# Patient Record
Sex: MALE | Race: WHITE | Employment: OTHER | URBAN - METROPOLITAN AREA
[De-identification: names, ages, dates, MRNs, and addresses within clinical notes are randomized per-mention and may not be internally consistent; named-entity substitution may affect disease eponyms.]

---

## 2017-01-30 RX ORDER — ACLIDINIUM BROMIDE 400 UG/1
POWDER, METERED RESPIRATORY (INHALATION)
Qty: 3 EACH | Refills: 0 | Status: SHIPPED | OUTPATIENT
Start: 2017-01-30 | End: 2017-03-16

## 2017-03-15 ENCOUNTER — TELEPHONE (OUTPATIENT)
Dept: PULMONOLOGY | Age: 62
End: 2017-03-15

## 2017-03-17 RX ORDER — BUDESONIDE AND FORMOTEROL FUMARATE DIHYDRATE 160; 4.5 UG/1; UG/1
2 AEROSOL RESPIRATORY (INHALATION) 2 TIMES DAILY
Qty: 3 INHALER | Refills: 3 | Status: SHIPPED | OUTPATIENT
Start: 2017-03-17 | End: 2017-10-05

## 2017-05-26 ENCOUNTER — TELEPHONE (OUTPATIENT)
Dept: PULMONOLOGY | Age: 62
End: 2017-05-26

## 2017-10-04 ENCOUNTER — TELEPHONE (OUTPATIENT)
Dept: PULMONOLOGY | Age: 62
End: 2017-10-04

## 2017-10-04 NOTE — TELEPHONE ENCOUNTER
Mirna's insurance will no longer pay Kip Gasca anymore so he will have to be switched for another medication. She would like clarification on where he is supposed to go from there. She doesn't know about the Symbicort if they are going to cover it or not. Will you please give her a call. If its in the evening will you call their home. If not call the cell phone.

## 2017-10-05 NOTE — TELEPHONE ENCOUNTER
I spoke with Rick Osorio and she will call me back when she finds out what medication they will cover to replace the [facility] [place] [facility] [place]

## 2017-10-19 DIAGNOSIS — J44.9 CHRONIC OBSTRUCTIVE PULMONARY DISEASE, UNSPECIFIED COPD TYPE (HCC): Primary | ICD-10-CM

## 2017-10-23 RX ORDER — ALBUTEROL SULFATE 90 UG/1
2 AEROSOL, METERED RESPIRATORY (INHALATION) EVERY 6 HOURS PRN
Qty: 1 INHALER | Refills: 0 | Status: SHIPPED | OUTPATIENT
Start: 2017-10-23 | End: 2017-11-13 | Stop reason: SDUPTHER

## 2017-10-26 ENCOUNTER — HOSPITAL ENCOUNTER (OUTPATIENT)
Dept: PULMONOLOGY | Age: 62
Discharge: HOME | End: 2017-10-26
Attending: INTERNAL MEDICINE | Admitting: INTERNAL MEDICINE

## 2017-10-26 VITALS — OXYGEN SATURATION: 96 % | HEART RATE: 80 BPM

## 2017-10-26 DIAGNOSIS — R91.1 SOLITARY PULMONARY NODULE: ICD-10-CM

## 2017-10-26 RX ORDER — ALBUTEROL SULFATE 90 UG/1
4 AEROSOL, METERED RESPIRATORY (INHALATION) ONCE
Status: COMPLETED | OUTPATIENT
Start: 2017-10-26 | End: 2017-10-26

## 2017-10-26 RX ADMIN — ALBUTEROL SULFATE 4 PUFF: 90 AEROSOL, METERED RESPIRATORY (INHALATION) at 13:33

## 2017-11-02 ENCOUNTER — TELEPHONE (OUTPATIENT)
Dept: PULMONOLOGY | Age: 62
End: 2017-11-02

## 2017-11-13 ENCOUNTER — OFFICE VISIT (OUTPATIENT)
Dept: PULMONOLOGY | Age: 62
End: 2017-11-13

## 2017-11-13 VITALS
BODY MASS INDEX: 15.71 KG/M2 | OXYGEN SATURATION: 96 % | HEIGHT: 64 IN | SYSTOLIC BLOOD PRESSURE: 103 MMHG | HEART RATE: 88 BPM | RESPIRATION RATE: 18 BRPM | WEIGHT: 92 LBS | DIASTOLIC BLOOD PRESSURE: 71 MMHG

## 2017-11-13 DIAGNOSIS — J44.9 COPD, MILD (HCC): Primary | ICD-10-CM

## 2017-11-13 DIAGNOSIS — Z87.891 PERSONAL HISTORY OF TOBACCO USE: ICD-10-CM

## 2017-11-13 PROCEDURE — G8926 SPIRO NO PERF OR DOC: HCPCS | Performed by: INTERNAL MEDICINE

## 2017-11-13 PROCEDURE — G8427 DOCREV CUR MEDS BY ELIG CLIN: HCPCS | Performed by: INTERNAL MEDICINE

## 2017-11-13 PROCEDURE — 3023F SPIROM DOC REV: CPT | Performed by: INTERNAL MEDICINE

## 2017-11-13 PROCEDURE — G8419 CALC BMI OUT NRM PARAM NOF/U: HCPCS | Performed by: INTERNAL MEDICINE

## 2017-11-13 PROCEDURE — 99213 OFFICE O/P EST LOW 20 MIN: CPT | Performed by: INTERNAL MEDICINE

## 2017-11-13 PROCEDURE — 1036F TOBACCO NON-USER: CPT | Performed by: INTERNAL MEDICINE

## 2017-11-13 PROCEDURE — G8484 FLU IMMUNIZE NO ADMIN: HCPCS | Performed by: INTERNAL MEDICINE

## 2017-11-13 PROCEDURE — 3017F COLORECTAL CA SCREEN DOC REV: CPT | Performed by: INTERNAL MEDICINE

## 2017-11-13 RX ORDER — ALBUTEROL SULFATE 90 UG/1
2 AEROSOL, METERED RESPIRATORY (INHALATION) EVERY 6 HOURS PRN
Qty: 1 INHALER | Refills: 3 | Status: SHIPPED | OUTPATIENT
Start: 2017-11-13 | End: 2018-04-15 | Stop reason: SDUPTHER

## 2017-11-13 NOTE — PROGRESS NOTES
noted.      Lungs/pleura: There is moderate centrilobular emphysema throughout the lungs. There is hyperinflation suggesting COPD. Again demonstrated is a 4 mm   subpleural nodule in the right upper lobe best seen on image 51. This is   stable since at least 01/02/2013 and therefore likely benign. No other pulmonary nodules are identified within the lungs. Previously seen   left lower lobe small pulmonary nodule may have represented an   infectious/inflammatory process. Scattered calcified granulomas are noted in   both lungs. No evidence of pleural effusion or pneumothorax. The central   airways are clear. Upper Abdomen: Limited images of the upper abdomen demonstrate no acute   abnormality. Scattered calcified granulomata are noted in the spleen. Soft Tissues/Bones: Age related degenerative changes of the visualized   osseous structures without focal destructive lesion. Impression   IMPRESSION:   4 mm subpleural right upper lobe pulmonary nodule stable since 01/02/2013. This documents 2 years of stability. This nodule can be considered benign   and may represent a noncalcified granuloma. As per [employer] guidelines described below, no further follow-up is   required. Small left lower lobe pulmonary nodule seen on recent prior examination have   resolved and may represent a resolved infectious/inflammatory process. No   other noncalcified pulmonary nodules are identified. Evidence of remote calcified granulomatous disease.      His alpha 1 antitrypsin was normal    Past Medical History:   Diagnosis Date    Bipolar 1 disorder (HCC)     Chronic bronchitis, obstructive (HCC)     COPD (chronic obstructive pulmonary disease) (HCC)     GERD (gastroesophageal reflux disease)     Darleen's      Current Outpatient Prescriptions   Medication Sig Dispense Refill    albuterol sulfate  (90 Base) MCG/ACT inhaler Inhale 2 puffs into the lungs every 6 hours as needed for Wheezing 1 Inhaler 3    tiotropium (SPIRIVA RESPIMAT) 2.5 MCG/ACT AERS inhaler Inhale 2 puffs into the lungs daily 3 Inhaler 1    fluticasone-salmeterol (ADVAIR HFA) 230-21 MCG/ACT inhaler Inhale 2 puffs into the lungs 2 times daily 3 Inhaler 1    omeprazole (PRILOSEC) 40 MG delayed release capsule Take 1 capsule by mouth  daily with breakfast 90 capsule 0    benztropine (COGENTIN) 2 MG tablet Take 1 mg by mouth daily.  paliperidone (INVEGA) 6 MG ER tablet Take 6 mg by mouth every morning. No current facility-administered medications for this visit. Review of Systems   Constitutional: Negative. Negative for fever, chills, diaphoresis, activity change, appetite change, fatigue and unexpected weight change. HENT: Negative. Negative for hearing loss, ear pain, nosebleeds, congestion, facial swelling, rhinorrhea, sneezing, neck pain, neck stiffness, postnasal drip, sinus pressure and ear discharge. Eyes: Negative. Negative for photophobia, pain, discharge, redness, itching and visual disturbance. Respiratory: As per HPI  Cardiovascular: Negative. Negative for chest pain, palpitations and leg swelling. Gastrointestinal: Negative. Negative for abdominal pain, blood in stool, abdominal distention and anal bleeding. Genitourinary: Negative. Negative for dysuria, urgency, frequency, hematuria, decreased urine volume, enuresis and difficulty urinating. Musculoskeletal: Negative. Negative for myalgias, back pain, joint swelling, arthralgias and gait problem. Skin: Negative. Negative for color change and pallor. Neurological: Negative. Negative for dizziness, tremors, seizures, syncope, facial asymmetry, speech difficulty, weakness, light-headedness, numbness and headaches. Hematological: Negative. Negative for adenopathy. Psychiatric/Behavioral: Negative. Negative for hallucinations, behavioral problems, confusion and agitation.  The patient is not nervous/anxious and is not hyperactive. Blood pressure 103/71, pulse 88, resp. rate 18, height 5' 4\" (1.626 m), weight 92 lb (41.7 kg), SpO2 96 %. Physical Exam   Constitutional: Oriented, well-developed and well-nourished. No distress. HENT:   Head: Normocephalic and atraumatic. Mouth/Throat: Oropharynx is clear and moist. No oropharyngeal exudate. Eyes: Conjunctivae and extraocular motions are normal. Pupils are equal, round, and reactive to light. Right eye exhibits no discharge. Left eye exhibits no discharge. Neck: Normal range of motion. Neck supple. No JVD present. Carotid bruit is not present. No rigidity. No tracheal deviation present. No thyromegaly present. Cardiovascular: Normal rate, regular rhythm, S1&S2 and intact distal pulses. Pulmonary/Chest: Effort normal and breath sounds normal. No stridor. No respiratory distress. No wheezes, rhonchi, rales or tenderness. Abdominal: Soft. Bowel sounds are normal, no shifting dullness, no distension and no mass. No tenderness. No rebound and no guarding. Musculoskeletal: Normal range of motion. No edema and no tenderness. Clubbing noted  Lymphadenopathy:    No cervical adenopathy. No axillary adenopathy. Neurological: Alert and oriented. Normal reflexes. No cranial nerve deficit. Normal muscle tone. Coordination normal.   Skin: Skin is warm and dry. No rash noted. No erythema. Psychiatric: Normal mood and affect. Behavior is normal. Thought content normal.        Assessment:    1. COPD, mild ([address]     2. Personal history of tobacco use  CT Lung Screening           Plan:    1. He continues to do well overall  2. He will need repeat CT chest, reorder  3. Continue Advair 230 BID, Spiriva daily and Albuterol PRN  4. I encouraged him to continue smoking cessation  5. Continue acapella and neb tx BID for airway clearance  6.  RTC in 6 months or sooner if needed

## 2017-11-13 NOTE — LETTER
Pulmonary, Critical Care & Sleep  [address], [address] [facility],[address] [address]  Phone: [phone]  Fax: [phone]      November 13, 2017       Patient: Robinson Singh   MR Number: [id number]   YOB: 1955   Date of Visit: 11/13/2017       Dear Dr. Rogelio Head:    I saw Mr. Isaac Brown today for F/U visit. Below are the relevant portions of my assessment and plan of care. Assessment:    1. COPD, mild ([address]     2. Personal history of tobacco use  CT Lung Screening         Plan:    1. He continues to do well overall  2. He will need repeat CT chest, reorder  3. Continue Advair 230 BID, Spiriva daily and Albuterol PRN  4. I encouraged him to continue smoking cessation  5. Continue acapella and neb tx BID for airway clearance  6. RTC in 6 months or sooner if needed  If you have questions, please do not hesitate to call me. I look forward to following Lavonne Joshua along with you.     Sincerely,        Adina Watson MD    CC providers:  Lucien Mclaughlin DO  [address] [facility] [address] [place] [address]  VIA [place]

## 2017-11-13 NOTE — COMMUNICATION BODY
Assessment:     Assessment:    1. COPD, mild ([facility]     2. Personal history of tobacco use  CT Lung Screening         Plan:     Plan:    1. He continues to do well overall  2. He will need repeat CT chest, reorder  3. Continue Advair 230 BID, Spiriva daily and Albuterol PRN  4. I encouraged him to continue smoking cessation  5. Continue acapella and neb tx BID for airway clearance  6.  RTC in 6 months or sooner if needed

## 2018-01-22 ENCOUNTER — TELEPHONE (OUTPATIENT)
Dept: INTERNAL MEDICINE CLINIC | Age: 63
End: 2018-01-22

## 2018-01-22 PROBLEM — R47.81 SLURRED SPEECH: Status: ACTIVE | Noted: 2018-01-22

## 2018-01-22 PROBLEM — K21.9 GERD (GASTROESOPHAGEAL REFLUX DISEASE): Status: ACTIVE | Noted: 2018-01-22

## 2018-01-22 PROBLEM — R65.10 SIRS (SYSTEMIC INFLAMMATORY RESPONSE SYNDROME) (HCC): Status: ACTIVE | Noted: 2018-01-22

## 2018-01-22 NOTE — TELEPHONE ENCOUNTER
Pt started having vomiting and nausea this morning. He was a previous pt of Dr. Gerber Ocasio. There are no available appts with any physician. Can you please contact pt's spouse with scheduling information?

## 2018-01-30 ENCOUNTER — OFFICE VISIT (OUTPATIENT)
Dept: INTERNAL MEDICINE CLINIC | Age: 63
End: 2018-01-30

## 2018-01-30 VITALS
BODY MASS INDEX: 15.89 KG/M2 | HEART RATE: 78 BPM | WEIGHT: 92.6 LBS | DIASTOLIC BLOOD PRESSURE: 70 MMHG | SYSTOLIC BLOOD PRESSURE: 100 MMHG | OXYGEN SATURATION: 97 % | TEMPERATURE: 97.8 F

## 2018-01-30 DIAGNOSIS — J44.9 CHRONIC OBSTRUCTIVE PULMONARY DISEASE, UNSPECIFIED COPD TYPE (HCC): Primary | ICD-10-CM

## 2018-01-30 DIAGNOSIS — Z13.220 SCREENING CHOLESTEROL LEVEL: ICD-10-CM

## 2018-01-30 DIAGNOSIS — F31.9 BIPOLAR AFFECTIVE DISORDER, REMISSION STATUS UNSPECIFIED (HCC): ICD-10-CM

## 2018-01-30 DIAGNOSIS — K21.9 GASTROESOPHAGEAL REFLUX DISEASE WITHOUT ESOPHAGITIS: ICD-10-CM

## 2018-01-30 DIAGNOSIS — Z23 NEED FOR PROPHYLACTIC VACCINATION AGAINST STREPTOCOCCUS PNEUMONIAE (PNEUMOCOCCUS): ICD-10-CM

## 2018-01-30 PROCEDURE — 90471 IMMUNIZATION ADMIN: CPT | Performed by: INTERNAL MEDICINE

## 2018-01-30 PROCEDURE — 1111F DSCHRG MED/CURRENT MED MERGE: CPT | Performed by: INTERNAL MEDICINE

## 2018-01-30 PROCEDURE — 90670 PCV13 VACCINE IM: CPT | Performed by: INTERNAL MEDICINE

## 2018-01-30 PROCEDURE — 99214 OFFICE O/P EST MOD 30 MIN: CPT | Performed by: INTERNAL MEDICINE

## 2018-01-30 RX ORDER — OMEPRAZOLE 20 MG/1
20 CAPSULE, DELAYED RELEASE ORAL DAILY
Qty: 90 CAPSULE | Refills: 1 | Status: SHIPPED | OUTPATIENT
Start: 2018-01-30 | End: 2018-04-15 | Stop reason: SDUPTHER

## 2018-01-30 ASSESSMENT — ENCOUNTER SYMPTOMS
HEARTBURN: 1
WHEEZING: 0
DIFFICULTY BREATHING: 1
COUGH: 1
RHINORRHEA: 0
CHEST TIGHTNESS: 0
FREQUENT THROAT CLEARING: 0
SPUTUM PRODUCTION: 0
TROUBLE SWALLOWING: 0
HEMOPTYSIS: 0
SHORTNESS OF BREATH: 1
HOARSE VOICE: 1
SORE THROAT: 0

## 2018-01-30 ASSESSMENT — COPD QUESTIONNAIRES: COPD: 1

## 2018-01-30 NOTE — PROGRESS NOTES
Subjective:      Patient ID: Byron Mckay is a 57 y.o. male. COPD   He complains of cough, difficulty breathing, hoarse voice and shortness of breath. There is no chest tightness, frequent throat clearing, hemoptysis, sputum production or wheezing. This is a chronic problem. The current episode started more than 1 year ago. The problem occurs rarely. The problem has been unchanged. The cough is non-productive. Associated symptoms include heartburn. Pertinent negatives include no appetite change, chest pain, dyspnea on exertion, ear congestion, ear pain, fever, headaches, malaise/fatigue, myalgias, nasal congestion, orthopnea, PND, postnasal drip, rhinorrhea, sneezing, sore throat, sweats, trouble swallowing or weight loss. His symptoms are aggravated by nothing. His symptoms are alleviated by nothing. He reports no improvement on treatment. His symptoms are not alleviated by beta-agonist. There are no known risk factors for lung disease. There is no history of asthma, bronchiectasis or COPD. Presents in follow ER visit for viral gastroenteritis. He is new to me. Past Medical History:   Diagnosis Date    Bipolar 1 disorder [address]     Chronic bronchitis, obstructive (HCC)     COPD (chronic obstructive pulmonary disease) (HCC)     GERD (gastroesophageal reflux disease)     Tourette's      Past Surgical History:   Procedure Laterality Date    ENDOSCOPY, COLON, DIAGNOSTIC      KNEE ARTHROSCOPY      bilateral    TONSILLECTOMY      UPPER GASTROINTESTINAL ENDOSCOPY  9/25/15    VASECTOMY       Family History   Problem Relation Age of Onset    Arthritis Mother     Glaucoma Mother     High Blood Pressure Mother     Heart Disease Father      age [de-identified]   [facility] COPD Father      emphysema    COPD Brother      Social History     Social History    Marital status:      Spouse name: N/A    Number of children: 1    Years of education: N/A     Occupational History    Not on file.      Social History 6 mg by mouth every morning.  aspirin 81 MG EC tablet Take 1 tablet by mouth daily 30 tablet 3     No current facility-administered medications for this visit. Vitals:    01/30/18 1636   BP: 100/70   Pulse: 78   Temp: 97.8 °F (36.6 °C)   SpO2: 97%   Weight: 92 lb 9.6 oz (42 kg)     Body mass index is 15.89 kg/m². Wt Readings from Last 3 Encounters:   01/30/18 92 lb 9.6 oz (42 kg)   01/25/18 95 lb 4.8 oz (43.2 kg)   11/13/17 92 lb (41.7 kg)     BP Readings from Last 3 Encounters:   01/30/18 100/70   01/25/18 132/77   11/13/17 103/71       Objective:   Physical Exam   Constitutional: He is oriented to person, place, and time. He appears well-developed and well-nourished. HENT:   Head: Normocephalic and atraumatic. Right Ear: Hearing, external ear and ear canal normal.   Left Ear: External ear and ear canal normal.   Nose: Mucosal edema and rhinorrhea present. No nose lacerations, sinus tenderness or nasal deformity. Right sinus exhibits no maxillary sinus tenderness and no frontal sinus tenderness. Left sinus exhibits no maxillary sinus tenderness and no frontal sinus tenderness. Mouth/Throat: Uvula is midline, oropharynx is clear and moist and mucous membranes are normal.   Eyes: Conjunctivae and EOM are normal. Pupils are equal, round, and reactive to light. Right eye exhibits no discharge. Left eye exhibits no discharge. Neck: Normal range of motion. Neck supple. Cardiovascular: Normal rate, regular rhythm, normal heart sounds and intact distal pulses. Pulmonary/Chest: He is in respiratory distress. He has wheezes. He has no rales. Neurological: He is alert and oriented to person, place, and time. He has normal strength. No cranial nerve deficit. Skin: Skin is warm. Psychiatric: He has a normal mood and affect. His behavior is normal. Judgment and thought content normal.   Nursing note and vitals reviewed.         The ASCVD Risk score (Yanet Hernandez., et al., 2013) failed to

## 2018-04-15 DIAGNOSIS — K21.9 GASTROESOPHAGEAL REFLUX DISEASE WITHOUT ESOPHAGITIS: ICD-10-CM

## 2018-04-16 RX ORDER — TIOTROPIUM BROMIDE INHALATION SPRAY 3.12 UG/1
SPRAY, METERED RESPIRATORY (INHALATION)
Qty: 3 INHALER | Refills: 0 | Status: SHIPPED | OUTPATIENT
Start: 2018-04-16 | End: 2018-08-05 | Stop reason: SDUPTHER

## 2018-04-16 RX ORDER — FLUTICASONE PROPIONATE AND SALMETEROL XINAFOATE 230; 21 UG/1; UG/1
AEROSOL, METERED RESPIRATORY (INHALATION)
Qty: 3 INHALER | Refills: 0 | Status: SHIPPED | OUTPATIENT
Start: 2018-04-16 | End: 2018-08-05 | Stop reason: SDUPTHER

## 2018-04-17 RX ORDER — OMEPRAZOLE 20 MG/1
20 CAPSULE, DELAYED RELEASE ORAL DAILY
Qty: 90 CAPSULE | Refills: 2 | Status: SHIPPED | OUTPATIENT
Start: 2018-04-17 | End: 2018-11-09 | Stop reason: SDUPTHER

## 2018-05-07 ENCOUNTER — HOSPITAL ENCOUNTER (OUTPATIENT)
Dept: CT IMAGING | Age: 63
Discharge: HOME | End: 2018-05-07
Attending: INTERNAL MEDICINE | Admitting: INTERNAL MEDICINE

## 2018-05-07 DIAGNOSIS — Z87.891 PERSONAL HISTORY OF TOBACCO USE: ICD-10-CM

## 2018-05-07 DIAGNOSIS — R91.1 SOLITARY PULMONARY NODULE: ICD-10-CM

## 2018-05-09 ENCOUNTER — OFFICE VISIT (OUTPATIENT)
Dept: PULMONOLOGY | Age: 63
End: 2018-05-09

## 2018-05-09 VITALS
RESPIRATION RATE: 18 BRPM | BODY MASS INDEX: 15.36 KG/M2 | OXYGEN SATURATION: 95 % | DIASTOLIC BLOOD PRESSURE: 71 MMHG | WEIGHT: 90 LBS | HEIGHT: 64 IN | SYSTOLIC BLOOD PRESSURE: 105 MMHG | HEART RATE: 82 BPM

## 2018-05-09 DIAGNOSIS — J43.2 CENTRILOBULAR EMPHYSEMA (HCC): Primary | ICD-10-CM

## 2018-05-09 DIAGNOSIS — R93.89 ABNORMAL CT OF THE CHEST: ICD-10-CM

## 2018-05-09 PROCEDURE — 3017F COLORECTAL CA SCREEN DOC REV: CPT | Performed by: INTERNAL MEDICINE

## 2018-05-09 PROCEDURE — 99214 OFFICE O/P EST MOD 30 MIN: CPT | Performed by: INTERNAL MEDICINE

## 2018-05-09 PROCEDURE — 3023F SPIROM DOC REV: CPT | Performed by: INTERNAL MEDICINE

## 2018-05-09 PROCEDURE — G8427 DOCREV CUR MEDS BY ELIG CLIN: HCPCS | Performed by: INTERNAL MEDICINE

## 2018-05-09 PROCEDURE — G8926 SPIRO NO PERF OR DOC: HCPCS | Performed by: INTERNAL MEDICINE

## 2018-05-09 PROCEDURE — G8419 CALC BMI OUT NRM PARAM NOF/U: HCPCS | Performed by: INTERNAL MEDICINE

## 2018-05-09 PROCEDURE — 1036F TOBACCO NON-USER: CPT | Performed by: INTERNAL MEDICINE

## 2018-05-10 ENCOUNTER — TELEPHONE (OUTPATIENT)
Dept: PULMONOLOGY | Age: 63
End: 2018-05-10

## 2018-05-18 ENCOUNTER — HOSPITAL ENCOUNTER (OUTPATIENT)
Dept: ENDOSCOPY | Age: 63
Discharge: HOME | End: 2018-05-18
Attending: INTERNAL MEDICINE | Admitting: INTERNAL MEDICINE

## 2018-05-18 ENCOUNTER — TELEPHONE (OUTPATIENT)
Dept: PULMONOLOGY | Age: 63
End: 2018-05-18

## 2018-05-18 VITALS
DIASTOLIC BLOOD PRESSURE: 70 MMHG | HEART RATE: 85 BPM | OXYGEN SATURATION: 97 % | TEMPERATURE: 98 F | RESPIRATION RATE: 16 BRPM | SYSTOLIC BLOOD PRESSURE: 115 MMHG

## 2018-05-18 DIAGNOSIS — R91.1 SOLITARY PULMONARY NODULE: ICD-10-CM

## 2018-05-18 LAB
ANION GAP SERPL CALCULATED.3IONS-SCNC: 12 MMOL/L (ref 3–16)
APTT: 34.1 SEC (ref 24.1–34.9)
BUN BLDV-MCNC: 14 MG/DL (ref 7–20)
CALCIUM SERPL-MCNC: 9.4 MG/DL (ref 8.3–10.6)
CHLORIDE BLD-SCNC: 104 MMOL/L (ref 99–110)
CO2: 26 MMOL/L (ref 21–32)
CREAT SERPL-MCNC: 0.7 MG/DL (ref 0.8–1.3)
GFR AFRICAN AMERICAN: >60
GFR NON-AFRICAN AMERICAN: >60
GLUCOSE BLD-MCNC: 102 MG/DL (ref 70–99)
HCT VFR BLD CALC: 47.9 % (ref 40.5–52.5)
HEMOGLOBIN: 16.8 G/DL (ref 13.5–17.5)
INR BLD: 1.02 (ref 0.85–1.15)
MCH RBC QN AUTO: 32.4 PG (ref 26–34)
MCHC RBC AUTO-ENTMCNC: 35 G/DL (ref 31–36)
MCV RBC AUTO: 92.5 FL (ref 80–100)
PDW BLD-RTO: 13.8 % (ref 12.4–15.4)
PLATELET # BLD: 313 K/UL (ref 135–450)
PMV BLD AUTO: 7.5 FL (ref 5–10.5)
POTASSIUM SERPL-SCNC: 4 MMOL/L (ref 3.5–5.1)
PROTHROMBIN TIME: 11.5 SEC (ref 9.6–13)
RBC # BLD: 5.18 M/UL (ref 4.2–5.9)
SODIUM BLD-SCNC: 142 MMOL/L (ref 136–145)
WBC # BLD: 13 K/UL (ref 4–11)

## 2018-05-18 PROCEDURE — 31622 DX BRONCHOSCOPE/WASH: CPT | Performed by: INTERNAL MEDICINE

## 2018-05-18 RX ORDER — SODIUM CHLORIDE 0.9 % (FLUSH) 0.9 %
10 SYRINGE (ML) INJECTION EVERY 12 HOURS SCHEDULED
Status: DISCONTINUED | OUTPATIENT
Start: 2018-05-18 | End: 2018-05-19 | Stop reason: HOSPADM

## 2018-05-18 RX ORDER — SODIUM CHLORIDE 0.9 % (FLUSH) 0.9 %
10 SYRINGE (ML) INJECTION PRN
Status: DISCONTINUED | OUTPATIENT
Start: 2018-05-18 | End: 2018-05-19 | Stop reason: HOSPADM

## 2018-05-18 RX ORDER — LIDOCAINE HYDROCHLORIDE 40 MG/ML
SOLUTION TOPICAL ONCE
Status: DISCONTINUED | OUTPATIENT
Start: 2018-05-18 | End: 2018-05-19 | Stop reason: HOSPADM

## 2018-05-18 RX ORDER — SODIUM CHLORIDE 9 MG/ML
INJECTION, SOLUTION INTRAVENOUS CONTINUOUS
Status: DISCONTINUED | OUTPATIENT
Start: 2018-05-18 | End: 2018-05-19 | Stop reason: HOSPADM

## 2018-05-18 RX ORDER — LIDOCAINE HYDROCHLORIDE 10 MG/ML
1 INJECTION, SOLUTION EPIDURAL; INFILTRATION; INTRACAUDAL; PERINEURAL
Status: ACTIVE | OUTPATIENT
Start: 2018-05-18 | End: 2018-05-18

## 2018-05-18 ASSESSMENT — PAIN SCALES - GENERAL
PAINLEVEL_OUTOF10: 0

## 2018-05-18 ASSESSMENT — ENCOUNTER SYMPTOMS: SHORTNESS OF BREATH: 0

## 2018-05-20 LAB
CULTURE, RESPIRATORY: ABNORMAL
CULTURE, RESPIRATORY: ABNORMAL
GRAM STAIN RESULT: ABNORMAL
ORGANISM: ABNORMAL

## 2018-06-01 ENCOUNTER — TELEPHONE (OUTPATIENT)
Dept: PULMONOLOGY | Age: 63
End: 2018-06-01

## 2018-06-07 ENCOUNTER — TELEPHONE (OUTPATIENT)
Dept: PULMONOLOGY | Age: 63
End: 2018-06-07

## 2018-06-07 ENCOUNTER — OFFICE VISIT (OUTPATIENT)
Dept: PULMONOLOGY | Age: 63
End: 2018-06-07

## 2018-06-07 VITALS
BODY MASS INDEX: 15.28 KG/M2 | SYSTOLIC BLOOD PRESSURE: 118 MMHG | DIASTOLIC BLOOD PRESSURE: 66 MMHG | HEART RATE: 84 BPM | OXYGEN SATURATION: 96 % | RESPIRATION RATE: 16 BRPM | WEIGHT: 89 LBS

## 2018-06-07 DIAGNOSIS — B44.1 PULMONARY ASPERGILLOSIS (HCC): Primary | ICD-10-CM

## 2018-06-07 DIAGNOSIS — J44.9 COPD, SEVERE (HCC): ICD-10-CM

## 2018-06-07 DIAGNOSIS — R93.89 ABNORMAL CT OF THE CHEST: Primary | ICD-10-CM

## 2018-06-07 DIAGNOSIS — J98.09 BRONCHOLITHIASIS: ICD-10-CM

## 2018-06-07 DIAGNOSIS — J43.2 CENTRILOBULAR EMPHYSEMA (HCC): ICD-10-CM

## 2018-06-07 DIAGNOSIS — B44.1 PULMONARY ASPERGILLOSIS (HCC): ICD-10-CM

## 2018-06-07 PROCEDURE — G8419 CALC BMI OUT NRM PARAM NOF/U: HCPCS | Performed by: INTERNAL MEDICINE

## 2018-06-07 PROCEDURE — G8926 SPIRO NO PERF OR DOC: HCPCS | Performed by: INTERNAL MEDICINE

## 2018-06-07 PROCEDURE — 3017F COLORECTAL CA SCREEN DOC REV: CPT | Performed by: INTERNAL MEDICINE

## 2018-06-07 PROCEDURE — 1036F TOBACCO NON-USER: CPT | Performed by: INTERNAL MEDICINE

## 2018-06-07 PROCEDURE — 99214 OFFICE O/P EST MOD 30 MIN: CPT | Performed by: INTERNAL MEDICINE

## 2018-06-07 PROCEDURE — 3023F SPIROM DOC REV: CPT | Performed by: INTERNAL MEDICINE

## 2018-06-07 PROCEDURE — G8427 DOCREV CUR MEDS BY ELIG CLIN: HCPCS | Performed by: INTERNAL MEDICINE

## 2018-06-07 RX ORDER — VORICONAZOLE 200 MG/1
200 TABLET, FILM COATED ORAL 2 TIMES DAILY
COMMUNITY
End: 2018-07-17 | Stop reason: SINTOL

## 2018-06-17 LAB
FUNGUS (MYCOLOGY) CULTURE: ABNORMAL
FUNGUS (MYCOLOGY) CULTURE: ABNORMAL
FUNGUS STAIN: ABNORMAL
ORGANISM: ABNORMAL

## 2018-07-03 LAB
AFB CULTURE (MYCOBACTERIA): NORMAL
AFB SMEAR: NORMAL

## 2018-07-06 ENCOUNTER — OFFICE VISIT (OUTPATIENT)
Dept: INTERNAL MEDICINE CLINIC | Age: 63
End: 2018-07-06

## 2018-07-06 VITALS
DIASTOLIC BLOOD PRESSURE: 60 MMHG | BODY MASS INDEX: 15.62 KG/M2 | SYSTOLIC BLOOD PRESSURE: 98 MMHG | HEART RATE: 90 BPM | WEIGHT: 91 LBS | OXYGEN SATURATION: 97 %

## 2018-07-06 DIAGNOSIS — H01.005 BLEPHARITIS OF LOWER EYELIDS OF BOTH EYES, UNSPECIFIED TYPE: Primary | ICD-10-CM

## 2018-07-06 DIAGNOSIS — H01.002 BLEPHARITIS OF LOWER EYELIDS OF BOTH EYES, UNSPECIFIED TYPE: Primary | ICD-10-CM

## 2018-07-06 PROCEDURE — G8427 DOCREV CUR MEDS BY ELIG CLIN: HCPCS | Performed by: INTERNAL MEDICINE

## 2018-07-06 PROCEDURE — 3017F COLORECTAL CA SCREEN DOC REV: CPT | Performed by: INTERNAL MEDICINE

## 2018-07-06 PROCEDURE — G8419 CALC BMI OUT NRM PARAM NOF/U: HCPCS | Performed by: INTERNAL MEDICINE

## 2018-07-06 PROCEDURE — 1036F TOBACCO NON-USER: CPT | Performed by: INTERNAL MEDICINE

## 2018-07-06 PROCEDURE — 99213 OFFICE O/P EST LOW 20 MIN: CPT | Performed by: INTERNAL MEDICINE

## 2018-07-06 RX ORDER — POLYMYXIN B SULFATE AND TRIMETHOPRIM 1; 10000 MG/ML; [USP'U]/ML
1 SOLUTION OPHTHALMIC EVERY 6 HOURS
Qty: 1 BOTTLE | Refills: 0 | Status: SHIPPED | OUTPATIENT
Start: 2018-07-06 | End: 2018-07-16

## 2018-07-06 ASSESSMENT — ENCOUNTER SYMPTOMS
SINUS PRESSURE: 0
SORE THROAT: 0
BACK PAIN: 0
ABDOMINAL PAIN: 0
CONSTIPATION: 0
RHINORRHEA: 0
DIARRHEA: 0
CHEST TIGHTNESS: 0
WHEEZING: 0
COUGH: 1
VOMITING: 0
EYE DISCHARGE: 1
SHORTNESS OF BREATH: 1
NAUSEA: 0
PHOTOPHOBIA: 0
EYE REDNESS: 1
EYE PAIN: 1

## 2018-07-06 NOTE — PROGRESS NOTES
drop into both eyes every 6 hours for 10 days  Dispense: 1 Bottle; Refill: 0         Plan/Patient Instructions:    Patient Instructions     Start polytrim eye drops   Patient Education        Blepharitis: Care Instructions  Your Care Instructions    Blepharitis is an inflammation or infection of the eyelids. It causes dry, scaly crusts on the eyelids. It can also cause your eyes to itch, burn, and look red. This problem is more common in people who have rosacea, dandruff, skin allergies, or eczema. Home treatment can help you keep your eyes comfortable. Your doctor may also prescribe an ointment to put on your eyelids. Follow-up care is a key part of your treatment and safety. Be sure to make and go to all appointments, and call your doctor if you are having problems. It's also a good idea to know your test results and keep a list of the medicines you take. How can you care for yourself at home? · Wash your eyelids and eyebrows daily with baby shampoo. To wash your eyelids:  ¨ Place a very warm washcloth over your eyes for about a minute. This will help soften and loosen the crusts on your eyelashes. ¨ Put a few drops of baby shampoo on a warm washcloth. ¨ Gently wipe your eyelids. This helps remove any crust. It also cleans your eyelids. ¨ Rinse well with water. · Be safe with medicines. If your doctor prescribed medicine for you, use it exactly as directed. Call your doctor if you think you are having a problem with your medicine. When should you call for help? Call your doctor now or seek immediate medical care if:    · You have signs of an eye infection, such as:  ¨ Pus or thick discharge coming from the eye. ¨ Redness or swelling around the eye. ¨ A fever.    Watch closely for changes in your health, and be sure to contact your doctor if:    · You have vision changes.     · You do not get better as expected. Where can you learn more? Go to [email or web address] [facility] and sign in to

## 2018-07-12 ENCOUNTER — TELEPHONE (OUTPATIENT)
Dept: PULMONOLOGY | Age: 63
End: 2018-07-12

## 2018-07-12 NOTE — TELEPHONE ENCOUNTER
Patient's wife called and said patient had blood work through [facility] on 07/11/18. If we don't have a copy yet, please call to request.  Patient has an appointment on 07/17/18.

## 2018-07-16 ENCOUNTER — OFFICE VISIT (OUTPATIENT)
Dept: INTERNAL MEDICINE CLINIC | Age: 63
End: 2018-07-16

## 2018-07-16 VITALS
WEIGHT: 91.4 LBS | DIASTOLIC BLOOD PRESSURE: 62 MMHG | TEMPERATURE: 98.6 F | HEART RATE: 104 BPM | RESPIRATION RATE: 16 BRPM | OXYGEN SATURATION: 97 % | BODY MASS INDEX: 15.69 KG/M2 | SYSTOLIC BLOOD PRESSURE: 114 MMHG

## 2018-07-16 DIAGNOSIS — L53.9: ICD-10-CM

## 2018-07-16 DIAGNOSIS — H01.006 BLEPHARITIS OF BOTH EYES, UNSPECIFIED EYELID, UNSPECIFIED TYPE: Primary | ICD-10-CM

## 2018-07-16 DIAGNOSIS — L55.9 SUNBURN: ICD-10-CM

## 2018-07-16 DIAGNOSIS — H01.003 BLEPHARITIS OF BOTH EYES, UNSPECIFIED EYELID, UNSPECIFIED TYPE: Primary | ICD-10-CM

## 2018-07-16 PROCEDURE — G8427 DOCREV CUR MEDS BY ELIG CLIN: HCPCS | Performed by: INTERNAL MEDICINE

## 2018-07-16 PROCEDURE — 1036F TOBACCO NON-USER: CPT | Performed by: INTERNAL MEDICINE

## 2018-07-16 PROCEDURE — G8419 CALC BMI OUT NRM PARAM NOF/U: HCPCS | Performed by: INTERNAL MEDICINE

## 2018-07-16 PROCEDURE — 99213 OFFICE O/P EST LOW 20 MIN: CPT | Performed by: INTERNAL MEDICINE

## 2018-07-16 PROCEDURE — 3017F COLORECTAL CA SCREEN DOC REV: CPT | Performed by: INTERNAL MEDICINE

## 2018-07-16 RX ORDER — ERYTHROMYCIN 5 MG/G
OINTMENT OPHTHALMIC 3 TIMES DAILY
Qty: 2 TUBE | Refills: 0 | Status: SHIPPED | OUTPATIENT
Start: 2018-07-16 | End: 2018-07-26

## 2018-07-16 NOTE — PROGRESS NOTES
Subjective:      Patient ID: Chata Pickard is a 57 y.o. male. Eye Problem    Both (61 yo mALE presents in f/u of blepharitis. it is not improving. he has sunburn.) eyes are affected. This is a new problem. The current episode started in the past 7 days. The problem occurs constantly. The problem has been unchanged. There was no injury mechanism (extensive sunburn and hisoty of dry eye). The pain is at a severity of 4/10. The pain is mild. There is no known exposure to pink eye. He does not wear contacts. Associated symptoms include eye redness and itching. Pertinent negatives include no blurred vision, eye discharge, double vision, fever, foreign body sensation, nausea, photophobia, recent URI or vomiting. He has tried eye drops and commercial eye wash for the symptoms. The treatment provided no relief. Review of Systems   Constitutional: Negative for fever. Eyes: Positive for redness and itching. Negative for blurred vision, double vision, photophobia and discharge. Gastrointestinal: Negative for nausea and vomiting. @DOS@    Allergies   Allergen Reactions    Pcn [Penicillins] Hives     Unknown reaction       Current Outpatient Prescriptions   Medication Sig Dispense Refill    trimethoprim-polymyxin b (POLYTRIM) 16065-8.1 UNIT/ML-% ophthalmic solution Place 1 drop into both eyes every 6 hours for 10 days 1 Bottle 0    voriconazole (VFEND) 200 MG tablet Take 200 mg by mouth 2 times daily      omeprazole (PRILOSEC) 20 MG delayed release capsule TAKE 1 CAPSULE BY MOUTH  DAILY 90 capsule 2    ADVAIR -21 MCG/ACT inhaler USE 2 PUFFS TWO TIMES DAILY 3 Inhaler 0    SPIRIVA RESPIMAT 2.5 MCG/ACT AERS inhaler USE 2 PUFFS DAILY 3 Inhaler 0    PROAIR  (90 Base) MCG/ACT inhaler USE 2 PUFFS EVERY 6 HOURS  AS NEEDED FOR WHEEZING 3 Inhaler 0    benztropine (COGENTIN) 2 MG tablet Take 1 mg by mouth daily.  paliperidone (INVEGA) 6 MG ER tablet Take 6 mg by mouth every morning.          No

## 2018-07-17 ENCOUNTER — OFFICE VISIT (OUTPATIENT)
Dept: PULMONOLOGY | Age: 63
End: 2018-07-17

## 2018-07-17 VITALS
HEART RATE: 86 BPM | DIASTOLIC BLOOD PRESSURE: 75 MMHG | OXYGEN SATURATION: 93 % | SYSTOLIC BLOOD PRESSURE: 112 MMHG | WEIGHT: 91 LBS | BODY MASS INDEX: 15.62 KG/M2 | RESPIRATION RATE: 18 BRPM

## 2018-07-17 DIAGNOSIS — L56.8 PHOTOSENSITIVITY DERMATITIS DUE TO SUN: ICD-10-CM

## 2018-07-17 DIAGNOSIS — B44.9 ASPERGILLUS PNEUMONIA (HCC): ICD-10-CM

## 2018-07-17 DIAGNOSIS — J43.2 CENTRILOBULAR EMPHYSEMA (HCC): Primary | ICD-10-CM

## 2018-07-17 PROCEDURE — 3017F COLORECTAL CA SCREEN DOC REV: CPT | Performed by: INTERNAL MEDICINE

## 2018-07-17 PROCEDURE — 99213 OFFICE O/P EST LOW 20 MIN: CPT | Performed by: INTERNAL MEDICINE

## 2018-07-17 PROCEDURE — G8926 SPIRO NO PERF OR DOC: HCPCS | Performed by: INTERNAL MEDICINE

## 2018-07-17 PROCEDURE — G8427 DOCREV CUR MEDS BY ELIG CLIN: HCPCS | Performed by: INTERNAL MEDICINE

## 2018-07-17 PROCEDURE — 1036F TOBACCO NON-USER: CPT | Performed by: INTERNAL MEDICINE

## 2018-07-17 PROCEDURE — 3023F SPIROM DOC REV: CPT | Performed by: INTERNAL MEDICINE

## 2018-07-17 PROCEDURE — G8419 CALC BMI OUT NRM PARAM NOF/U: HCPCS | Performed by: INTERNAL MEDICINE

## 2018-07-17 NOTE — PROGRESS NOTES
effusion with compressive atelectasis.       There is no pneumothorax.  Significant emphysema involves the bilateral   lungs.  No change in the 4 mm right upper lobe pulmonary nodule.       Upper Abdomen: Images of the upper abdomen are unremarkable.       Soft Tissues/Bones: Degenerative changes involve the thoracic spine.           Impression   1. New 1.0 cm filling defect within the bronchus intermedius either   representing an endobronchial lesion or mucous plugging causing right middle   lobe collapse and right lower lobe bronchiolitis.  Recommend further   evaluation with bronchoscopy. 2. Trace right pleural effusion   3. Unchanged small pericardial effusion. 4. Unchanged 4 mm right upper lobe pulmonary nodule.  Recommendation       RECOMMENDATION:   Per ACR Lung-RADS Version 1.0     His alpha 1 antitrypsin was normal    Past Medical History:   Diagnosis Date    Aspergillus ([address] 05/18/2018    resp culture    Bipolar 1 disorder (HCC)     Chronic bronchitis, obstructive (HCC)     COPD (chronic obstructive pulmonary disease) (HCC)     GERD (gastroesophageal reflux disease)     Darleen's      Current Outpatient Prescriptions   Medication Sig Dispense Refill    erythromycin (ROMYCIN) 5 MG/GM ophthalmic ointment Place into both eyes 3 times daily for 10 days Apply to bilateral lower eyelids tid. 2 Tube 0    omeprazole (PRILOSEC) 20 MG delayed release capsule TAKE 1 CAPSULE BY MOUTH  DAILY 90 capsule 2    ADVAIR -21 MCG/ACT inhaler USE 2 PUFFS TWO TIMES DAILY 3 Inhaler 0    SPIRIVA RESPIMAT 2.5 MCG/ACT AERS inhaler USE 2 PUFFS DAILY 3 Inhaler 0    PROAIR  (90 Base) MCG/ACT inhaler USE 2 PUFFS EVERY 6 HOURS  AS NEEDED FOR WHEEZING 3 Inhaler 0    benztropine (COGENTIN) 2 MG tablet Take 1 mg by mouth daily.  paliperidone (INVEGA) 6 MG ER tablet Take 6 mg by mouth every morning. No current facility-administered medications for this visit.           Review of Systems tracheal deviation present. No thyromegaly present. Cardiovascular: Normal rate, regular rhythm, S1&S2 and intact distal pulses. Pulmonary/Chest: Effort normal and breath sounds normal. No stridor. No respiratory distress. No wheezes, rhonchi, rales or tenderness. Abdominal: Soft. Bowel sounds are normal, no shifting dullness, no distension and no mass. No tenderness. No rebound and no guarding. Musculoskeletal: Normal range of motion. No edema and no tenderness. Clubbing noted  Lymphadenopathy:    No cervical adenopathy. No axillary adenopathy. Neurological: Alert and oriented. Normal reflexes. No cranial nerve deficit. Normal muscle tone. Coordination normal.   Skin: Skin is warm and dry. No rash noted. No erythema. Psychiatric: Normal mood and affect. Behavior is normal. Thought content normal.        Assessment:     Diagnosis Orders   1. Centrilobular emphysema ([address]     2. 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 Aspergillus pneumonia ([facility] [facility]             Plan:    1. I am concerned about his severe photosensitivity, he is scheduled to see ophthalmology later today. 2. I asked him to stop Vfend, I did call his wife at work and I did also inform her the importance of stop Vfend and avoid any exposure to sun  3. He did have a follow-up bronchoscopy at  with Dr. Leah Patrick few days ago and he is scheduled to have her repeat CT chest in September 4. Continue Advair 230 BID, Spiriva daily and Albuterol PRN  5.  Return to see in 4 weeks

## 2018-07-25 ENCOUNTER — HOSPITAL ENCOUNTER (OUTPATIENT)
Dept: OTHER | Age: 63
Discharge: HOME | End: 2018-07-25

## 2018-07-25 LAB
EKG ATRIAL RATE: 77 BPM
EKG DIAGNOSIS: NORMAL
EKG P AXIS: 41 DEGREES
EKG P-R INTERVAL: 146 MS
EKG Q-T INTERVAL: 376 MS
EKG QRS DURATION: 70 MS
EKG QTC CALCULATION (BAZETT): 425 MS
EKG R AXIS: 57 DEGREES
EKG T AXIS: 67 DEGREES
EKG VENTRICULAR RATE: 77 BPM

## 2018-07-25 PROCEDURE — 93010 ELECTROCARDIOGRAM REPORT: CPT | Performed by: INTERNAL MEDICINE

## 2018-07-31 ASSESSMENT — ENCOUNTER SYMPTOMS
NAUSEA: 0
FOREIGN BODY SENSATION: 0
EYE DISCHARGE: 0
PHOTOPHOBIA: 0
DOUBLE VISION: 0
EYE REDNESS: 1
BLURRED VISION: 0
VOMITING: 0
EYE ITCHING: 1

## 2018-08-06 RX ORDER — TIOTROPIUM BROMIDE INHALATION SPRAY 3.12 UG/1
SPRAY, METERED RESPIRATORY (INHALATION)
Qty: 3 INHALER | Refills: 1 | Status: SHIPPED | OUTPATIENT
Start: 2018-08-06 | End: 2018-11-09 | Stop reason: SDUPTHER

## 2018-08-06 RX ORDER — FLUTICASONE PROPIONATE AND SALMETEROL XINAFOATE 230; 21 UG/1; UG/1
AEROSOL, METERED RESPIRATORY (INHALATION)
Qty: 3 INHALER | Refills: 1 | Status: SHIPPED | OUTPATIENT
Start: 2018-08-06 | End: 2018-11-09 | Stop reason: SDUPTHER

## 2018-08-21 ENCOUNTER — OFFICE VISIT (OUTPATIENT)
Dept: INTERNAL MEDICINE CLINIC | Age: 63
End: 2018-08-21

## 2018-08-21 VITALS
BODY MASS INDEX: 15.55 KG/M2 | DIASTOLIC BLOOD PRESSURE: 68 MMHG | TEMPERATURE: 98.2 F | RESPIRATION RATE: 16 BRPM | SYSTOLIC BLOOD PRESSURE: 108 MMHG | HEART RATE: 78 BPM | OXYGEN SATURATION: 98 % | WEIGHT: 90.6 LBS

## 2018-08-21 DIAGNOSIS — I49.1 PAC (PREMATURE ATRIAL CONTRACTION): ICD-10-CM

## 2018-08-21 DIAGNOSIS — Z23 NEED FOR PROPHYLACTIC VACCINATION AGAINST STREPTOCOCCUS PNEUMONIAE (PNEUMOCOCCUS): Primary | ICD-10-CM

## 2018-08-21 DIAGNOSIS — I49.9 IRREGULAR HEARTBEAT: ICD-10-CM

## 2018-08-21 PROCEDURE — G8427 DOCREV CUR MEDS BY ELIG CLIN: HCPCS | Performed by: INTERNAL MEDICINE

## 2018-08-21 PROCEDURE — G8419 CALC BMI OUT NRM PARAM NOF/U: HCPCS | Performed by: INTERNAL MEDICINE

## 2018-08-21 PROCEDURE — 1036F TOBACCO NON-USER: CPT | Performed by: INTERNAL MEDICINE

## 2018-08-21 PROCEDURE — 3017F COLORECTAL CA SCREEN DOC REV: CPT | Performed by: INTERNAL MEDICINE

## 2018-08-21 PROCEDURE — 99213 OFFICE O/P EST LOW 20 MIN: CPT | Performed by: INTERNAL MEDICINE

## 2018-08-21 PROCEDURE — 93000 ELECTROCARDIOGRAM COMPLETE: CPT | Performed by: INTERNAL MEDICINE

## 2018-08-21 RX ORDER — CIPROFLOXACIN 500 MG/1
500 TABLET, FILM COATED ORAL 2 TIMES DAILY
COMMUNITY
End: 2018-08-21 | Stop reason: ALTCHOICE

## 2018-08-21 NOTE — PROGRESS NOTES
Subjective:      Patient ID: Gladys Diallo is a 57 y.o. male. Chief Complaint   Patient presents with    Irregular Heart Beat     Referred by Pulmonary     Slight abnormal EKG      advised to see PCP        HPI    Patient referred from cardiology. He denies any palpitations. He states he has had no new shortness of   Breath  Or chest pain. Past Medical History:   Diagnosis Date    Aspergillus ([address] 05/18/2018    resp culture    Bipolar 1 disorder (HCC)     Chronic bronchitis, obstructive (HCC)     COPD (chronic obstructive pulmonary disease) (HCC)     GERD (gastroesophageal reflux disease)     Marlynette's      Past Surgical History:   Procedure Laterality Date    BRONCHOSCOPY N/A 05/18/2018    ENDOSCOPY, COLON, DIAGNOSTIC      KNEE ARTHROSCOPY      bilateral    TONSILLECTOMY      UPPER GASTROINTESTINAL ENDOSCOPY  9/25/15    VASECTOMY       Family History   Problem Relation Age of Onset    Arthritis Mother     Glaucoma Mother     High Blood Pressure Mother     Heart Disease Father         age [de-identified]   [employer] COPD Father         emphysema    COPD Brother      Social History     Social History    Marital status:      Spouse name: N/A    Number of children: 1    Years of education: N/A     Occupational History    Not on file. Social History Main Topics    Smoking status: Former Smoker     Packs/day: 1.50     Years: 35.00     Types: Cigarettes     Quit date: 1/1/2013    Smokeless tobacco: Never Used    Alcohol use No      Comment: 24 yrs sober    Drug use: No    Sexual activity: Not Currently     Other Topics Concern    Not on file     Social History Narrative    Lives home with wife. Review of Systems   Constitutional: Negative. HENT: Negative. Eyes: Negative. Respiratory: Negative. Cardiovascular: Negative. Gastrointestinal: Negative. Endocrine: Negative. Musculoskeletal: Negative. Allergic/Immunologic: Negative. Neurological: Negative. Hematological: Negative. Psychiatric/Behavioral: Negative. All other systems reviewed and are negative. Allergies   Allergen Reactions    Pcn [Penicillins] Hives     Unknown reaction       Current Outpatient Prescriptions   Medication Sig Dispense Refill    ADVAIR -21 MCG/ACT inhaler USE 2 PUFFS TWO TIMES DAILY 3 Inhaler 1    SPIRIVA RESPIMAT 2.5 MCG/ACT AERS inhaler USE 2 PUFFS DAILY 3 Inhaler 1    omeprazole (PRILOSEC) 20 MG delayed release capsule TAKE 1 CAPSULE BY MOUTH  DAILY 90 capsule 2    PROAIR  (90 Base) MCG/ACT inhaler USE 2 PUFFS EVERY 6 HOURS  AS NEEDED FOR WHEEZING 3 Inhaler 0    benztropine (COGENTIN) 2 MG tablet Take 1 mg by mouth daily.  paliperidone (INVEGA) 6 MG ER tablet Take 6 mg by mouth every morning. No current facility-administered medications for this visit. Vitals:    08/21/18 0942   BP: 108/68   Pulse: 78   Resp: 16   Temp: 98.2 °F (36.8 °C)   TempSrc: Oral   SpO2: 98%   Weight: 90 lb 9.6 oz (41.1 kg)     Body mass index is 15.55 kg/m². Wt Readings from Last 3 Encounters:   08/21/18 90 lb 9.6 oz (41.1 kg)   07/17/18 91 lb (41.3 kg)   07/16/18 91 lb 6.4 oz (41.5 kg)     BP Readings from Last 3 Encounters:   08/21/18 108/68   07/17/18 112/75   07/16/18 114/62       Objective:   Physical Exam   Constitutional: He is oriented to person, place, and time. He appears well-developed and well-nourished. No distress. HENT:   Head: Normocephalic and atraumatic. Pulmonary/Chest: Effort normal.   Neurological: He is alert and oriented to person, place, and time. Skin: Skin is warm. Psychiatric: He has a normal mood and affect. His behavior is normal. Judgment and thought content normal.   Nursing note and vitals reviewed. Assessment/Plan:  Pelon Campbell was seen today for irregular heart beat.     Diagnoses and all orders for this visit:  PAC (premature atrial contraction)  -     EKG 12 lead  - recommend adequate hydration  - recommend follow up with cardiology    Irregular heartbeat  - referred by pulmonology    Return in about 2 months (around 10/21/2018).               Cooper Diop MD

## 2018-08-22 ENCOUNTER — OFFICE VISIT (OUTPATIENT)
Dept: PULMONOLOGY | Age: 63
End: 2018-08-22

## 2018-08-22 ENCOUNTER — TELEPHONE (OUTPATIENT)
Dept: PULMONOLOGY | Age: 63
End: 2018-08-22

## 2018-08-22 VITALS
HEIGHT: 64 IN | BODY MASS INDEX: 15.54 KG/M2 | WEIGHT: 91 LBS | OXYGEN SATURATION: 95 % | RESPIRATION RATE: 18 BRPM | SYSTOLIC BLOOD PRESSURE: 126 MMHG | HEART RATE: 83 BPM | DIASTOLIC BLOOD PRESSURE: 84 MMHG

## 2018-08-22 DIAGNOSIS — B44.1 PULMONARY ASPERGILLOSIS (HCC): Primary | ICD-10-CM

## 2018-08-22 DIAGNOSIS — J47.9 BRONCHIECTASIS WITHOUT COMPLICATION (HCC): ICD-10-CM

## 2018-08-22 DIAGNOSIS — B44.1 PULMONARY ASPERGILLOSIS (HCC): ICD-10-CM

## 2018-08-22 DIAGNOSIS — J43.2 CENTRILOBULAR EMPHYSEMA (HCC): ICD-10-CM

## 2018-08-22 DIAGNOSIS — J44.9 COPD, MILD (HCC): Primary | ICD-10-CM

## 2018-08-22 PROCEDURE — G8926 SPIRO NO PERF OR DOC: HCPCS | Performed by: INTERNAL MEDICINE

## 2018-08-22 PROCEDURE — 1036F TOBACCO NON-USER: CPT | Performed by: INTERNAL MEDICINE

## 2018-08-22 PROCEDURE — 3017F COLORECTAL CA SCREEN DOC REV: CPT | Performed by: INTERNAL MEDICINE

## 2018-08-22 PROCEDURE — 99214 OFFICE O/P EST MOD 30 MIN: CPT | Performed by: INTERNAL MEDICINE

## 2018-08-22 PROCEDURE — G8427 DOCREV CUR MEDS BY ELIG CLIN: HCPCS | Performed by: INTERNAL MEDICINE

## 2018-08-22 PROCEDURE — G8419 CALC BMI OUT NRM PARAM NOF/U: HCPCS | Performed by: INTERNAL MEDICINE

## 2018-08-22 PROCEDURE — 3023F SPIROM DOC REV: CPT | Performed by: INTERNAL MEDICINE

## 2018-08-22 NOTE — PROGRESS NOTES
compressive atelectasis.       There is no pneumothorax.  Significant emphysema involves the bilateral   lungs.  No change in the 4 mm right upper lobe pulmonary nodule.       Upper Abdomen: Images of the upper abdomen are unremarkable.       Soft Tissues/Bones: Degenerative changes involve the thoracic spine.           Impression   1. New 1.0 cm filling defect within the bronchus intermedius either   representing an endobronchial lesion or mucous plugging causing right middle   lobe collapse and right lower lobe bronchiolitis.  Recommend further   evaluation with bronchoscopy. 2. Trace right pleural effusion   3. Unchanged small pericardial effusion. 4. Unchanged 4 mm right upper lobe pulmonary nodule.  Recommendation       RECOMMENDATION:   Per ACR Lung-RADS Version 1.0     His alpha 1 antitrypsin was normal    Past Medical History:   Diagnosis Date    Aspergillus ([facility] [address] 05/18/2018    resp culture    Bipolar 1 disorder (HCC)     Chronic bronchitis, obstructive (HCC)     COPD (chronic obstructive pulmonary disease) (HCC)     GERD (gastroesophageal reflux disease)     Darleen's      Current Outpatient Prescriptions   Medication Sig Dispense Refill    ADVAIR -21 MCG/ACT inhaler USE 2 PUFFS TWO TIMES DAILY 3 Inhaler 1    SPIRIVA RESPIMAT 2.5 MCG/ACT AERS inhaler USE 2 PUFFS DAILY 3 Inhaler 1    omeprazole (PRILOSEC) 20 MG delayed release capsule TAKE 1 CAPSULE BY MOUTH  DAILY 90 capsule 2    PROAIR  (90 Base) MCG/ACT inhaler USE 2 PUFFS EVERY 6 HOURS  AS NEEDED FOR WHEEZING 3 Inhaler 0    benztropine (COGENTIN) 2 MG tablet Take 1 mg by mouth daily.  paliperidone (INVEGA) 6 MG ER tablet Take 6 mg by mouth every morning. No current facility-administered medications for this visit. Review of Systems   Constitutional: Negative. Negative for fever, chills, diaphoresis, activity change, appetite change, fatigue and unexpected weight change. HENT: Negative.   Negative for Effort normal and breath sounds normal. No stridor. No respiratory distress. No wheezes, rhonchi, rales or tenderness. Abdominal: Soft. Bowel sounds are normal, no shifting dullness, no distension and no mass. No tenderness. No rebound and no guarding. Musculoskeletal: Normal range of motion. No edema and no tenderness. Clubbing noted  Lymphadenopathy:    No cervical adenopathy. No axillary adenopathy. Neurological: Alert and oriented. Normal reflexes. No cranial nerve deficit. Normal muscle tone. Coordination normal.   Skin: Skin is warm and dry. No rash noted. No erythema. Psychiatric: Normal mood and affect. Behavior is normal. Thought content normal.        Assessment:     Diagnosis Orders   1. COPD, mild ([address]     2. Bronchiectasis without complication ([address]     3. Centrilobular emphysema ([address]     4. Pulmonary aspergillosis ([address]             Plan:    1. His severe photosensitivity has improved  2. I will keep him off Vfend for another month, he is scheduled to have repeat CT chest next month as well. I will refer him to ID service  3. I will order immunoglobulin levels and sweat chloride test  4. Continue Advair 230 BID, Spiriva daily and Albuterol PRN  5. I had a lengthy discussion with patient regarding diagnosis and treatment plan. Over 25 minutes were spent during visit, half of which was face to face discussion and included counseling on current condition and treatment.   Return to see in 4 weeks

## 2018-08-22 NOTE — LETTER
[facility], [address], [address] [address]  Phone: [phone]  Fax: [phone]        August 22, 2018       Patient: Arnie Fuller   MR Number: [id number]   YOB: 1955   Date of Visit: 8/22/2018       Dear Dr. Scotty Kingston MD      I saw Mr. Christian Mejia today for follow-up visit. Below are the relevant portions of my assessment and plan of care. Assessment:     Diagnosis Orders   1. COPD, mild ([address]     2. Bronchiectasis without complication ([address]     3. Centrilobular emphysema ([address]     4. Pulmonary aspergillosis ([address]       Plan:    1. His severe photosensitivity has improved  2. I will keep him off Vfend for another month, he is scheduled to have repeat CT chest next month as well. I will refer him to ID service  3. I will order immunoglobulin levels and sweat chloride test  4. Continue Advair 230 BID, Spiriva daily and Albuterol PRN  5. I had a lengthy discussion with patient regarding diagnosis and treatment plan. Over 25 minutes were spent during visit, half of which was face to face discussion and included counseling on current condition and treatment. Return to see in 4 weeks         If you have questions, please do not hesitate to call me. I look forward to following [place] along with you.     Sincerely,        Osiel Price MD    CC providers:  David Gutierrez  [facility] [place]

## 2018-08-29 ENCOUNTER — TELEPHONE (OUTPATIENT)
Dept: PULMONOLOGY | Age: 63
End: 2018-08-29

## 2018-08-29 NOTE — TELEPHONE ENCOUNTER
Pt wife called she need an order for a speech evaluation and a sweat test for Mr. Enma Ward.   She asked about the appt with Dr. Dean Javed I gave her the date  And time he is schedule to see Dr. eDan Javed.  [phone]

## 2018-08-30 ENCOUNTER — TELEPHONE (OUTPATIENT)
Dept: PULMONOLOGY | Age: 63
End: 2018-08-30

## 2018-09-04 ENCOUNTER — OFFICE VISIT (OUTPATIENT)
Dept: INFECTIOUS DISEASES | Age: 63
End: 2018-09-04

## 2018-09-04 VITALS
TEMPERATURE: 97.1 F | DIASTOLIC BLOOD PRESSURE: 73 MMHG | OXYGEN SATURATION: 94 % | WEIGHT: 91.6 LBS | SYSTOLIC BLOOD PRESSURE: 111 MMHG | HEART RATE: 97 BPM | HEIGHT: 64 IN | BODY MASS INDEX: 15.64 KG/M2

## 2018-09-04 DIAGNOSIS — J47.9 BRONCHIECTASIS WITHOUT COMPLICATION (HCC): ICD-10-CM

## 2018-09-04 DIAGNOSIS — J44.9 CHRONIC OBSTRUCTIVE PULMONARY DISEASE, UNSPECIFIED COPD TYPE (HCC): ICD-10-CM

## 2018-09-04 DIAGNOSIS — F17.200 SMOKING: ICD-10-CM

## 2018-09-04 DIAGNOSIS — K21.9 GASTROESOPHAGEAL REFLUX DISEASE WITHOUT ESOPHAGITIS: ICD-10-CM

## 2018-09-04 DIAGNOSIS — R93.89 ABNORMAL CT OF THE CHEST: ICD-10-CM

## 2018-09-04 DIAGNOSIS — J44.1 COPD EXACERBATION (HCC): ICD-10-CM

## 2018-09-04 DIAGNOSIS — J43.2 CENTRILOBULAR EMPHYSEMA (HCC): ICD-10-CM

## 2018-09-04 DIAGNOSIS — F31.9 BIPOLAR AFFECTIVE DISORDER, REMISSION STATUS UNSPECIFIED (HCC): ICD-10-CM

## 2018-09-04 DIAGNOSIS — Z71.6 TOBACCO ABUSE COUNSELING: ICD-10-CM

## 2018-09-04 DIAGNOSIS — R63.4 WEIGHT LOSS: Chronic | ICD-10-CM

## 2018-09-04 DIAGNOSIS — B44.1 PULMONARY ASPERGILLOSIS (HCC): Primary | ICD-10-CM

## 2018-09-04 DIAGNOSIS — F17.200 TOBACCO USE DISORDER: Chronic | ICD-10-CM

## 2018-09-04 DIAGNOSIS — E44.0 PROTEIN-CALORIE MALNUTRITION, MODERATE (HCC): ICD-10-CM

## 2018-09-04 PROCEDURE — 99205 OFFICE O/P NEW HI 60 MIN: CPT | Performed by: INTERNAL MEDICINE

## 2018-09-04 ASSESSMENT — ENCOUNTER SYMPTOMS
EYE REDNESS: 0
WHEEZING: 0
RESPIRATORY NEGATIVE: 1
NAUSEA: 0
SORE THROAT: 0
BLURRED VISION: 0
CONSTIPATION: 0
SHORTNESS OF BREATH: 1
BACK PAIN: 0
EYES NEGATIVE: 1
GASTROINTESTINAL NEGATIVE: 1
ALLERGIC/IMMUNOLOGIC NEGATIVE: 1
DIARRHEA: 0
SPUTUM PRODUCTION: 0
DOUBLE VISION: 0
COUGH: 0
ABDOMINAL PAIN: 0
HEMOPTYSIS: 0
EYE DISCHARGE: 0

## 2018-09-04 NOTE — PROGRESS NOTES
(gastroesophageal reflux disease)     Darleen's        Past Surgical History:  Past surgical history was reviewed by me during this visit in detail. Past Surgical History:   Procedure Laterality Date    BRONCHOSCOPY N/A 05/18/2018    ENDOSCOPY, COLON, DIAGNOSTIC      KNEE ARTHROSCOPY      bilateral    TONSILLECTOMY      UPPER GASTROINTESTINAL ENDOSCOPY  9/25/15    VASECTOMY         Current Medications: All medications were reviewed by me during this visit  Current Outpatient Prescriptions   Medication Sig Dispense Refill    ADVAIR -21 MCG/ACT inhaler USE 2 PUFFS TWO TIMES DAILY 3 Inhaler 1    SPIRIVA RESPIMAT 2.5 MCG/ACT AERS inhaler USE 2 PUFFS DAILY 3 Inhaler 1    omeprazole (PRILOSEC) 20 MG delayed release capsule TAKE 1 CAPSULE BY MOUTH  DAILY 90 capsule 2    PROAIR  (90 Base) MCG/ACT inhaler USE 2 PUFFS EVERY 6 HOURS  AS NEEDED FOR WHEEZING 3 Inhaler 0    benztropine (COGENTIN) 2 MG tablet Take 1 mg by mouth daily.  paliperidone (INVEGA) 6 MG ER tablet Take 6 mg by mouth every morning. No current facility-administered medications for this visit. Family history: All family history was reviewed by me today    Family History   Problem Relation Age of Onset    Arthritis Mother     Glaucoma Mother     High Blood Pressure Mother     Heart Disease Father         age [de-identified] COPD Father         emphysema    COPD Brother        No family history of immunocompromising or autoimmune conditions. No h/o TB in in family or contacts    Social History:  All social and epidemiologic history was reviewed and updated be me in detail today.     Social History     Social History    Marital status:      Spouse name: N/A    Number of children: 1    Years of education: N/A     Social History Main Topics    Smoking status: Former Smoker     Packs/day: 1.50     Years: 35.00     Types: Cigarettes     Quit date: 1/1/2013    Smokeless tobacco: Never Used    Alcohol CRP    1.     Imaging: All pertinent images and reports for the current visit were reviewed by me during this visit. Outside records:    Labs, Microbiology, Radiology and pertinent results from Care everywhere, if available, were reviewed as a part of the consultation. Allergies:   All allergies data was reviewed by me during this visit  Pcn [penicillins]      Microbiology:   All available micro data was reviewed by me during this visit    · Bronchoalveolar lavage culture - collected on 5/18/18 at [facility] : Aspergillus fumigatus  · Bronchoalveolar lavage culture - collected on 5/31/18 : Aspergillus fumigatus  · Bronchoalveolar lavage culture - collected on 7/12/18 at [facility] : Acinetobacter, Pantoea    Organism Antibiotic Method Susceptibility   Acinetobacter baumannii complex Ampicillin/Sulbactam MINIMUM INHIBITORY CONCENTRATION WITH INTERPRETATION <=2: Susceptible    Cefepime MINIMUM INHIBITORY CONCENTRATION WITH INTERPRETATION 4: Susceptible    Ciprofloxacin MINIMUM INHIBITORY CONCENTRATION WITH INTERPRETATION <=0.25: Susceptible    Gentamicin MINIMUM INHIBITORY CONCENTRATION WITH INTERPRETATION <=1: Susceptible    Levofloxacin MINIMUM INHIBITORY CONCENTRATION WITH INTERPRETATION <=0.12: Susceptible    Meropenem  MINIMUM INHIBITORY CONCENTRATION WITH INTERPRETATION <=0.25: Susceptible    Piperacillin/Tazobactam MINIMUM INHIBITORY CONCENTRATION WITH INTERPRETATION <=4: Susceptible    Tobramycin MINIMUM INHIBITORY CONCENTRATION WITH INTERPRETATION <=1: Susceptible   Pantoea species Cefazolin MINIMUM INHIBITORY CONCENTRATION WITH INTERPRETATION >=64: Resistant    Ceftriaxone MINIMUM INHIBITORY CONCENTRATION WITH INTERPRETATION <=1: Susceptible    Ciprofloxacin MINIMUM INHIBITORY CONCENTRATION WITH INTERPRETATION <=0.25: Susceptible    Gentamicin MINIMUM INHIBITORY CONCENTRATION WITH INTERPRETATION <=1: Susceptible    Sulfa/Trimethoprim MINIMUM INHIBITORY CONCENTRATION WITH INTERPRETATION <=20: Susceptible    Tobramycin MINIMUM INHIBITORY CONCENTRATION WITH INTERPRETATION <=1: Susceptible         Problem list:       Patient Active Problem List   Diagnosis Code    Bronchitis J40    Chronic obstructive pulmonary disease ([facility] [address] J44.9    Bipolar affective disorder ([facility] [address] F31.9    Smoking F17.200    Weight loss R63.4    COPD exacerbation ([facility]) J44.1    Hypoxemia R09.02    Weight loss R63.4    Tobacco use disorder F17.200    Pulmonary emphysema ([facility]) J43.9    Pulmonary nodule R91.1    Granulomatous lung disease ([facility]) J84.10    Asthma J45.909    Imbalance R26.89    Transient cerebral ischemia G45.9    Slurred speech R47.81    Gastroesophageal reflux disease without esophagitis K21.9    SIRS (systemic inflammatory response syndrome) ([facility]) R65.10    Dysarthria R47.1    Dizziness R42    Gait abnormality R26.9    Aphasia R47.01    Nausea vomiting and diarrhea R11.2, R19.7    Blood culture positive for microorganism R79.89    Tobacco abuse counseling Z71.6    Diarrhea R19.7    Abnormal CT of the chest R93.8    COPD, mild ([facility]) J44.9    Bronchiectasis without complication ([facility]) V54.8    Pulmonary aspergillosis ([facility]) B44.1         IMPRESSION:    The patient is a 57 y.o. old male who  has a past medical history of Aspergillus ([facility] [address] (05/18/2018); Bipolar 1 disorder ([facility] [address] Chronic bronchitis, obstructive ([facility]); COPD (chronic obstructive pulmonary disease) ([facility] [address] GERD (gastroesophageal reflux disease); and Tourette's. was seen today for following problems:    1. Pulmonary aspergillosis ([address]    2. Tobacco use disorder    3. Weight loss    4. Chronic obstructive pulmonary disease, unspecified COPD type ([address]    5. Bipolar affective disorder, remission status unspecified ([facility] [address]    6. Smoking    7. COPD exacerbation ([address]    8. Centrilobular emphysema ([address]    9. Gastroesophageal reflux disease without esophagitis    10.  Tobacco abuse counseling

## 2018-09-12 ENCOUNTER — TELEPHONE (OUTPATIENT)
Dept: PULMONOLOGY | Age: 63
End: 2018-09-12

## 2018-09-12 ENCOUNTER — HOSPITAL ENCOUNTER (OUTPATIENT)
Dept: CT IMAGING | Age: 63
Discharge: HOME | End: 2018-09-12
Attending: INTERNAL MEDICINE | Admitting: INTERNAL MEDICINE

## 2018-09-12 DIAGNOSIS — R91.1 PULMONARY NODULE: Primary | ICD-10-CM

## 2018-09-12 DIAGNOSIS — R93.89 ABNORMAL CT OF THE CHEST: ICD-10-CM

## 2018-09-12 DIAGNOSIS — R91.1 SOLITARY PULMONARY NODULE: ICD-10-CM

## 2018-09-12 NOTE — TELEPHONE ENCOUNTER
Pt informed of his CT Chest results - order is in [facility] - pt's appt for his Sniff Test is on 9/27/18 at [facility]

## 2018-09-25 ENCOUNTER — OFFICE VISIT (OUTPATIENT)
Dept: INFECTIOUS DISEASES | Age: 63
End: 2018-09-25
Payer: COMMERCIAL

## 2018-09-25 VITALS
BODY MASS INDEX: 15.43 KG/M2 | SYSTOLIC BLOOD PRESSURE: 113 MMHG | OXYGEN SATURATION: 96 % | DIASTOLIC BLOOD PRESSURE: 74 MMHG | WEIGHT: 90.4 LBS | TEMPERATURE: 96.8 F | HEART RATE: 80 BPM | HEIGHT: 64 IN

## 2018-09-25 DIAGNOSIS — F31.9 BIPOLAR AFFECTIVE DISORDER, REMISSION STATUS UNSPECIFIED (HCC): ICD-10-CM

## 2018-09-25 DIAGNOSIS — J44.9 CHRONIC OBSTRUCTIVE PULMONARY DISEASE, UNSPECIFIED COPD TYPE (HCC): ICD-10-CM

## 2018-09-25 DIAGNOSIS — B44.1 PULMONARY ASPERGILLOSIS (HCC): Primary | ICD-10-CM

## 2018-09-25 DIAGNOSIS — Z71.6 TOBACCO ABUSE COUNSELING: ICD-10-CM

## 2018-09-25 DIAGNOSIS — R63.4 WEIGHT LOSS: Chronic | ICD-10-CM

## 2018-09-25 DIAGNOSIS — Z87.891 EX-SMOKER: ICD-10-CM

## 2018-09-25 DIAGNOSIS — F17.200 TOBACCO USE DISORDER: Chronic | ICD-10-CM

## 2018-09-25 DIAGNOSIS — J47.9 BRONCHIECTASIS WITHOUT COMPLICATION (HCC): ICD-10-CM

## 2018-09-25 DIAGNOSIS — J43.2 CENTRILOBULAR EMPHYSEMA (HCC): ICD-10-CM

## 2018-09-25 DIAGNOSIS — J44.9 COPD, MILD (HCC): ICD-10-CM

## 2018-09-25 PROCEDURE — 99214 OFFICE O/P EST MOD 30 MIN: CPT | Performed by: INTERNAL MEDICINE

## 2018-09-25 RX ORDER — POSACONAZOLE 100 MG/1
300 TABLET, DELAYED RELEASE ORAL
Qty: 30 TABLET | Refills: 5 | Status: SHIPPED | OUTPATIENT
Start: 2018-09-25 | End: 2018-10-25 | Stop reason: SDUPTHER

## 2018-09-25 ASSESSMENT — ENCOUNTER SYMPTOMS
EYE DISCHARGE: 0
NAUSEA: 0
BACK PAIN: 0
BLURRED VISION: 0
DOUBLE VISION: 0
ABDOMINAL PAIN: 0
WHEEZING: 0
DIARRHEA: 0
SPUTUM PRODUCTION: 0
CONSTIPATION: 0
COUGH: 1
HEMOPTYSIS: 0
SHORTNESS OF BREATH: 1
EYE REDNESS: 0
SORE THROAT: 0

## 2018-09-25 NOTE — PROGRESS NOTES
No murmur heard. Pulmonary/Chest: No stridor. No respiratory distress. He has no wheezes. He has rales (patchy crackles). Abdominal: Soft. Bowel sounds are normal. There is no tenderness. There is no rebound and no guarding. Musculoskeletal: Normal range of motion. He exhibits no edema or tenderness. Lymphadenopathy:     He has no cervical adenopathy. He has no axillary adenopathy. Neurological: He is alert and oriented to person, place, and time. He exhibits normal muscle tone. Skin: Skin is warm and dry. No rash noted. He is not diaphoretic. No erythema. Psychiatric: He has a normal mood and affect. Nursing note and vitals reviewed. DATA:  All available lab data was reviewed by me during this visit    Last CBC:  Lab Results   Component Value Date    WBC 13.0 (H) 05/18/2018    HGB 16.8 05/18/2018    HCT 47.9 05/18/2018    MCV 92.5 05/18/2018     05/18/2018    LYMPHOPCT 19.8 01/25/2018    RBC 5.18 05/18/2018    MCH 32.4 05/18/2018    MCHC 35.0 05/18/2018    RDW 13.8 05/18/2018       Last BMP:  Lab Results   Component Value Date     05/18/2018    K 4.0 05/18/2018    K 3.7 01/23/2018     05/18/2018    CO2 26 05/18/2018    BUN 14 05/18/2018    CREATININE 0.7 05/18/2018    GLUCOSE 102 05/18/2018    CALCIUM 9.4 05/18/2018        Hepatic Function Panel:   Lab Results   Component Value Date    ALKPHOS 49 01/22/2018    ALT 22 01/22/2018    AST 21 01/22/2018    PROT 6.3 01/22/2018    PROT 6.6 08/27/2012    BILITOT 1.0 01/22/2018    BILIDIR 0.11 08/27/2012    IBILI 0.2 08/27/2012    LABALBU 3.0 01/25/2018       Last CK: No results found for: CKTOTAL    Last ESR:  No results found for: SEDRATE    Last CRP:  No results found for: CRP      Imaging: All pertinent images and reports for the current visit were reviewed by me during this visit.     Outside records:    Labs, Microbiology, Radiology and pertinent results from Care everywhere, if available, were reviewed as a part of the consultation. Allergies: All allergies data was reviewed by me during this visit  Pcn [penicillins]    Microbiology:   All available micro data was reviewed by me during this visit    ·     Problem list:       Patient Active Problem List   Diagnosis Code    Bronchitis J40    Chronic obstructive pulmonary disease (HCC) J44.9    Bipolar affective disorder ([address] F31.9    Smoking F17.200    Weight loss R63.4    COPD exacerbation ([address] J44.1    Hypoxemia R09.02    Weight loss R63.4    Tobacco use disorder F17.200    Pulmonary emphysema ([address] J43.9    Pulmonary nodule R91.1    Granulomatous lung disease ([address] J84.10    Asthma J45.909    Imbalance R26.89    Transient cerebral ischemia G45.9    Slurred speech R47.81    Gastroesophageal reflux disease without esophagitis K21.9    SIRS (systemic inflammatory response syndrome) ([facility] R65.10    Dysarthria R47.1    Dizziness R42    Gait abnormality R26.9    Aphasia R47.01    Nausea vomiting and diarrhea R11.2, R19.7    Blood culture positive for microorganism R79.89    Tobacco abuse counseling Z71.6    Diarrhea R19.7    Abnormal CT of the chest R93.8    COPD, mild ([facility]) J44.9    Bronchiectasis without complication ([address] U45.9    Pulmonary aspergillosis ([facility]) B44.1         IMPRESSION:    The patient is a 57 y.o. old male who  has a past medical history of Aspergillus ([address] (05/18/2018); Bipolar 1 disorder ([address] Chronic bronchitis, obstructive (HCC); COPD (chronic obstructive pulmonary disease) ([address] GERD (gastroesophageal reflux disease); and Tourette's. was seen today for following problems:    1. Pulmonary aspergillosis ([address]    2. Tobacco use disorder    3. Chronic obstructive pulmonary disease, unspecified COPD type ([address]    4. Bipolar affective disorder, remission status unspecified ([address]    5. Weight loss    6. Centrilobular emphysema ([address]    7. Tobacco abuse counseling    8. Ex-smoker    9. COPD, mild ([address]    10.  Bronchiectasis

## 2018-09-26 ENCOUNTER — HOSPITAL ENCOUNTER (OUTPATIENT)
Age: 63
Discharge: HOME | End: 2018-09-26
Payer: COMMERCIAL

## 2018-09-26 DIAGNOSIS — B44.1 PULMONARY ASPERGILLOSIS (HCC): ICD-10-CM

## 2018-09-26 LAB
A/G RATIO: 2 (ref 1.1–2.2)
ALBUMIN SERPL-MCNC: 4.5 G/DL (ref 3.4–5)
ALP BLD-CCNC: 57 U/L (ref 40–129)
ALT SERPL-CCNC: 24 U/L (ref 10–40)
ANION GAP SERPL CALCULATED.3IONS-SCNC: 12 MMOL/L (ref 3–16)
AST SERPL-CCNC: 22 U/L (ref 15–37)
BASOPHILS ABSOLUTE: 0.1 K/UL (ref 0–0.2)
BASOPHILS RELATIVE PERCENT: 0.6 %
BILIRUB SERPL-MCNC: 0.4 MG/DL (ref 0–1)
BUN BLDV-MCNC: 15 MG/DL (ref 7–20)
CALCIUM SERPL-MCNC: 9.7 MG/DL (ref 8.3–10.6)
CHLORIDE BLD-SCNC: 105 MMOL/L (ref 99–110)
CO2: 26 MMOL/L (ref 21–32)
CREAT SERPL-MCNC: 0.7 MG/DL (ref 0.8–1.3)
EOSINOPHILS ABSOLUTE: 0.1 K/UL (ref 0–0.6)
EOSINOPHILS RELATIVE PERCENT: 0.7 %
GFR AFRICAN AMERICAN: >60
GFR NON-AFRICAN AMERICAN: >60
GLOBULIN: 2.3 G/DL
GLUCOSE BLD-MCNC: 99 MG/DL (ref 70–99)
HCT VFR BLD CALC: 48.9 % (ref 40.5–52.5)
HEMOGLOBIN: 16.6 G/DL (ref 13.5–17.5)
LYMPHOCYTES ABSOLUTE: 1.4 K/UL (ref 1–5.1)
LYMPHOCYTES RELATIVE PERCENT: 14.7 %
MCH RBC QN AUTO: 31.7 PG (ref 26–34)
MCHC RBC AUTO-ENTMCNC: 33.9 G/DL (ref 31–36)
MCV RBC AUTO: 93.7 FL (ref 80–100)
MONOCYTES ABSOLUTE: 0.8 K/UL (ref 0–1.3)
MONOCYTES RELATIVE PERCENT: 8.2 %
NEUTROPHILS ABSOLUTE: 7.2 K/UL (ref 1.7–7.7)
NEUTROPHILS RELATIVE PERCENT: 75.8 %
PDW BLD-RTO: 14.4 % (ref 12.4–15.4)
PLATELET # BLD: 261 K/UL (ref 135–450)
PMV BLD AUTO: 7.8 FL (ref 5–10.5)
POTASSIUM SERPL-SCNC: 4.1 MMOL/L (ref 3.5–5.1)
RBC # BLD: 5.22 M/UL (ref 4.2–5.9)
SODIUM BLD-SCNC: 143 MMOL/L (ref 136–145)
TOTAL PROTEIN: 6.8 G/DL (ref 6.4–8.2)
WBC # BLD: 9.5 K/UL (ref 4–11)

## 2018-09-26 PROCEDURE — 36415 COLL VENOUS BLD VENIPUNCTURE: CPT

## 2018-09-26 PROCEDURE — 80053 COMPREHEN METABOLIC PANEL: CPT

## 2018-09-26 PROCEDURE — 85025 COMPLETE CBC W/AUTO DIFF WBC: CPT

## 2018-09-27 LAB
COLLECTION SITE: NORMAL
Lab: NORMAL
Lab: NORMAL MMOL/L
SWEAT CHLORIDE: 25 MMOL/L

## 2018-10-03 ENCOUNTER — OFFICE VISIT (OUTPATIENT)
Dept: PULMONOLOGY | Age: 63
End: 2018-10-03
Payer: COMMERCIAL

## 2018-10-03 ENCOUNTER — TELEPHONE (OUTPATIENT)
Dept: INFECTIOUS DISEASES | Age: 63
End: 2018-10-03

## 2018-10-03 VITALS
HEIGHT: 64 IN | OXYGEN SATURATION: 97 % | BODY MASS INDEX: 15.54 KG/M2 | RESPIRATION RATE: 18 BRPM | DIASTOLIC BLOOD PRESSURE: 81 MMHG | HEART RATE: 93 BPM | WEIGHT: 91 LBS | SYSTOLIC BLOOD PRESSURE: 133 MMHG

## 2018-10-03 DIAGNOSIS — R91.1 PULMONARY NODULE: ICD-10-CM

## 2018-10-03 DIAGNOSIS — J47.9 BRONCHIECTASIS WITHOUT COMPLICATION (HCC): Primary | ICD-10-CM

## 2018-10-03 DIAGNOSIS — J43.2 CENTRILOBULAR EMPHYSEMA (HCC): ICD-10-CM

## 2018-10-03 DIAGNOSIS — J44.9 COPD, MILD (HCC): ICD-10-CM

## 2018-10-03 DIAGNOSIS — B44.1 PULMONARY ASPERGILLOSIS (HCC): ICD-10-CM

## 2018-10-03 PROCEDURE — 3023F SPIROM DOC REV: CPT | Performed by: INTERNAL MEDICINE

## 2018-10-03 PROCEDURE — 99214 OFFICE O/P EST MOD 30 MIN: CPT | Performed by: INTERNAL MEDICINE

## 2018-10-03 PROCEDURE — G8427 DOCREV CUR MEDS BY ELIG CLIN: HCPCS | Performed by: INTERNAL MEDICINE

## 2018-10-03 PROCEDURE — G8484 FLU IMMUNIZE NO ADMIN: HCPCS | Performed by: INTERNAL MEDICINE

## 2018-10-03 PROCEDURE — 3017F COLORECTAL CA SCREEN DOC REV: CPT | Performed by: INTERNAL MEDICINE

## 2018-10-03 PROCEDURE — G8419 CALC BMI OUT NRM PARAM NOF/U: HCPCS | Performed by: INTERNAL MEDICINE

## 2018-10-03 PROCEDURE — G8926 SPIRO NO PERF OR DOC: HCPCS | Performed by: INTERNAL MEDICINE

## 2018-10-03 PROCEDURE — 1036F TOBACCO NON-USER: CPT | Performed by: INTERNAL MEDICINE

## 2018-10-03 NOTE — PROGRESS NOTES
Dispense Refill    posaconazole (NOXAFIL) 100 MG TBEC tablet Take 3 tablets by mouth daily (with breakfast) 30 tablet 5    ADVAIR -21 MCG/ACT inhaler USE 2 PUFFS TWO TIMES DAILY 3 Inhaler 1    SPIRIVA RESPIMAT 2.5 MCG/ACT AERS inhaler USE 2 PUFFS DAILY 3 Inhaler 1    omeprazole (PRILOSEC) 20 MG delayed release capsule TAKE 1 CAPSULE BY MOUTH  DAILY 90 capsule 2    PROAIR  (90 Base) MCG/ACT inhaler USE 2 PUFFS EVERY 6 HOURS  AS NEEDED FOR WHEEZING 3 Inhaler 0    benztropine (COGENTIN) 2 MG tablet Take 1 mg by mouth daily.  paliperidone (INVEGA) 6 MG ER tablet Take 6 mg by mouth every morning. No current facility-administered medications for this visit. Review of Systems   Constitutional: Negative. Negative for fever, chills, diaphoresis, activity change, appetite change, fatigue and unexpected weight change. HENT: Negative. Negative for hearing loss, ear pain, nosebleeds, congestion, facial swelling, rhinorrhea, sneezing, neck pain, neck stiffness, postnasal drip, sinus pressure and ear discharge. Eyes: Negative. Negative for photophobia, pain, discharge, redness, itching and visual disturbance. Respiratory: As per HPI  Cardiovascular: Negative. Negative for chest pain, palpitations and leg swelling. Gastrointestinal: Negative. Negative for abdominal pain, blood in stool, abdominal distention and anal bleeding. Genitourinary: Negative. Negative for dysuria, urgency, frequency, hematuria, decreased urine volume, enuresis and difficulty urinating. Musculoskeletal: Negative. Negative for myalgias, back pain, joint swelling, arthralgias and gait problem. Skin: Negative. Negative for color change and pallor. Neurological: Negative. Negative for dizziness, tremors, seizures, syncope, facial asymmetry, speech difficulty, weakness, light-headedness, numbness and headaches. Hematological: Negative. Negative for adenopathy.    Psychiatric/Behavioral: that I favor the infectious/inflammatory nature but we will repeat CT chest in 3 months  2. He was already started again on Posaconazole per Dr. Moreno  3. I will forward the levels of his immunoglobulin to Dr. Moreno  4. Continue Advair 230 BID, Spiriva daily and Albuterol PRN  5.  Return to see in 3 months after repeat CT chest

## 2018-10-04 ENCOUNTER — TELEPHONE (OUTPATIENT)
Dept: PULMONOLOGY | Age: 63
End: 2018-10-04

## 2018-10-04 DIAGNOSIS — T17.908A ASPIRATION INTO AIRWAY, INITIAL ENCOUNTER: Primary | ICD-10-CM

## 2018-10-10 ENCOUNTER — TELEPHONE (OUTPATIENT)
Dept: PULMONOLOGY | Age: 63
End: 2018-10-10

## 2018-10-10 ENCOUNTER — HOSPITAL ENCOUNTER (OUTPATIENT)
Dept: SPEECH THERAPY | Age: 63
Setting detail: THERAPIES SERIES
Discharge: HOME | End: 2018-10-10
Payer: COMMERCIAL

## 2018-10-10 DIAGNOSIS — T17.908A ASPIRATION INTO AIRWAY, INITIAL ENCOUNTER: Primary | ICD-10-CM

## 2018-10-10 PROCEDURE — 92610 EVALUATE SWALLOWING FUNCTION: CPT

## 2018-10-10 PROCEDURE — 92611 MOTION FLUOROSCOPY/SWALLOW: CPT

## 2018-10-10 PROCEDURE — G8997 SWALLOW GOAL STATUS: HCPCS

## 2018-10-10 PROCEDURE — 92526 ORAL FUNCTION THERAPY: CPT

## 2018-10-10 PROCEDURE — G8996 SWALLOW CURRENT STATUS: HCPCS

## 2018-10-10 NOTE — PROGRESS NOTES
exercises, Patient/Family education, Diet tolerance monitoring    Compensatory Swallowing Strategies Attempted  Compensatory Swallowing Strategies: No straws;Upright as possible for all oral intake;Effortful swallow;Eat/Feed slowly; Small bites/sips    Treatment/Goals  Short-term Goals  Timeframe for Short-term Goals: 2-4 weeks  1.) The patient will tolerate recommended diet without observed clinical signs of aspiration  2.) The patient will tolerate thin liquids without signs and symptoms of aspiration 10/10 via straw. 3.) The patient will tolerate instrumental swallowing procedure    Long-term Goals  Timeframe for Long-term Goals: 4-6 weeks  1. )The patient will tolerate regular texture diet with thin liquids without observed clinical signs of aspiration    General  Chart Reviewed: Yes  Visit Information  Onset Date: 01/22/18  SLP Insurance Information: [facility] [employer]; 60 visits  Behavior/Cognition  Behavior/Cognition: Alert; Cooperative;Pleasant mood  Respiratory Status: Room air  Communication Observation: Dysarthria; Functional (Per pt report, this is baseline)  Follows Directions: Simple  Dentition: Adequate  Patient Positioning: Upright in chair  Baseline Vocal Quality: Weak  Volitional Cough: Strong  Volitional Swallow: Delayed  Prior Dysphagia History: Pt evaluated/treated by inpt speech therapy for dysphagia during hospitalization 1/22/18. Per pt, pt has a hx of aspiration stating I \"aspirated a pea and it crystalized in my lung. \"  Consistencies Administered: Reg solid; Soft solid;Puree; Thin - cup; Thin - straw;Mixed Consistencies    Vision/Hearing  Vision  Vision: Impaired  Vision Exceptions: Wears glasses for reading (wears glasses when driving)  Hearing  Hearing: Exceptions to [facility]  Hearing Exceptions: Hard of hearing/hearing concerns    Oral Motor Deficits  Oral/Motor  Oral Motor: Exceptions to [facility]  Labial ROM: Reduced left; Reduced right  Labial Symmetry: Abnormal symmetry right    Oral Phase

## 2018-10-15 ENCOUNTER — OFFICE VISIT (OUTPATIENT)
Dept: INTERNAL MEDICINE CLINIC | Age: 63
End: 2018-10-15
Payer: COMMERCIAL

## 2018-10-15 VITALS
RESPIRATION RATE: 16 BRPM | HEART RATE: 78 BPM | DIASTOLIC BLOOD PRESSURE: 66 MMHG | HEIGHT: 64 IN | WEIGHT: 88.6 LBS | BODY MASS INDEX: 15.13 KG/M2 | TEMPERATURE: 98.4 F | SYSTOLIC BLOOD PRESSURE: 110 MMHG | OXYGEN SATURATION: 97 %

## 2018-10-15 DIAGNOSIS — Z00.00 WELL ADULT EXAM: Primary | ICD-10-CM

## 2018-10-15 DIAGNOSIS — R63.6 LOW BODY WEIGHT DUE TO INADEQUATE CALORIC INTAKE: ICD-10-CM

## 2018-10-15 DIAGNOSIS — Z23 NEEDS FLU SHOT: ICD-10-CM

## 2018-10-15 DIAGNOSIS — B44.1 PULMONARY ASPERGILLOSIS (HCC): ICD-10-CM

## 2018-10-15 DIAGNOSIS — Z23 NEED FOR PNEUMOCOCCAL VACCINATION: ICD-10-CM

## 2018-10-15 PROCEDURE — 90471 IMMUNIZATION ADMIN: CPT | Performed by: INTERNAL MEDICINE

## 2018-10-15 PROCEDURE — 90686 IIV4 VACC NO PRSV 0.5 ML IM: CPT | Performed by: INTERNAL MEDICINE

## 2018-10-15 PROCEDURE — 90732 PPSV23 VACC 2 YRS+ SUBQ/IM: CPT | Performed by: INTERNAL MEDICINE

## 2018-10-15 PROCEDURE — G8482 FLU IMMUNIZE ORDER/ADMIN: HCPCS | Performed by: INTERNAL MEDICINE

## 2018-10-15 PROCEDURE — 99396 PREV VISIT EST AGE 40-64: CPT | Performed by: INTERNAL MEDICINE

## 2018-10-15 PROCEDURE — 90472 IMMUNIZATION ADMIN EACH ADD: CPT | Performed by: INTERNAL MEDICINE

## 2018-10-15 ASSESSMENT — PATIENT HEALTH QUESTIONNAIRE - PHQ9
2. FEELING DOWN, DEPRESSED OR HOPELESS: 0
SUM OF ALL RESPONSES TO PHQ QUESTIONS 1-9: 0
SUM OF ALL RESPONSES TO PHQ9 QUESTIONS 1 & 2: 0
SUM OF ALL RESPONSES TO PHQ QUESTIONS 1-9: 0
1. LITTLE INTEREST OR PLEASURE IN DOING THINGS: 0

## 2018-10-15 ASSESSMENT — ENCOUNTER SYMPTOMS
FACIAL SWELLING: 0
EYES NEGATIVE: 1
BLOOD IN STOOL: 0
ALLERGIC/IMMUNOLOGIC NEGATIVE: 1
RHINORRHEA: 0
ABDOMINAL PAIN: 0
DIARRHEA: 0
SHORTNESS OF BREATH: 1
ANAL BLEEDING: 0
ABDOMINAL DISTENTION: 0
GASTROINTESTINAL NEGATIVE: 1
CONSTIPATION: 0
COUGH: 1

## 2018-10-15 NOTE — PROGRESS NOTES
Subjective:      Patient ID: Roxana Rea is a 57 y.o. male. Chief Complaint   Patient presents with    Annual Exam     annual  well visit          HPI     . SUBJECTIVE:   Roxana Rea is a 57 y.o. male presenting for his annual checkup. He has chronic COPD and pulmonary aspergillosis. Past Medical History:   Diagnosis Date    Aspergillus ([address] 05/18/2018    resp culture    Bipolar 1 disorder (HCC)     Chronic bronchitis, obstructive (HCC)     COPD (chronic obstructive pulmonary disease) (HCC)     GERD (gastroesophageal reflux disease)     Marlynette's      Past Surgical History:   Procedure Laterality Date    BRONCHOSCOPY N/A 05/18/2018    ENDOSCOPY, COLON, DIAGNOSTIC      KNEE ARTHROSCOPY      bilateral    TONSILLECTOMY      UPPER GASTROINTESTINAL ENDOSCOPY  9/25/15    VASECTOMY       Family History   Problem Relation Age of Onset    Arthritis Mother     Glaucoma Mother     High Blood Pressure Mother     Heart Disease Father         age [de-identified]   Thayer Bio COPD Father         emphysema    COPD Brother      Social History     Social History    Marital status:      Spouse name: N/A    Number of children: 1    Years of education: N/A     Occupational History    Not on file. Social History Main Topics    Smoking status: Former Smoker     Packs/day: 1.50     Years: 35.00     Types: Cigarettes     Quit date: 1/1/2013    Smokeless tobacco: Never Used    Alcohol use No      Comment: 24 yrs sober    Drug use: No    Sexual activity: Not Currently     Other Topics Concern    Not on file     Social History Narrative    Lives home with wife. Review of Systems   Constitutional: Negative. HENT: Negative for dental problem, drooling, ear discharge, ear pain, facial swelling, hearing loss, mouth sores, nosebleeds, postnasal drip and rhinorrhea. Eyes: Negative. Respiratory: Positive for cough and shortness of breath. Cardiovascular: Negative. Gastrointestinal: Negative.

## 2018-10-17 ENCOUNTER — HOSPITAL ENCOUNTER (OUTPATIENT)
Dept: GENERAL RADIOLOGY | Age: 63
Discharge: HOME | End: 2018-10-17
Payer: COMMERCIAL

## 2018-10-17 ENCOUNTER — HOSPITAL ENCOUNTER (OUTPATIENT)
Dept: SPEECH THERAPY | Age: 63
Setting detail: THERAPIES SERIES
Discharge: HOME | End: 2018-10-17
Payer: COMMERCIAL

## 2018-10-17 DIAGNOSIS — R13.10 DYSPHAGIA, UNSPECIFIED TYPE: ICD-10-CM

## 2018-10-17 PROCEDURE — G8996 SWALLOW CURRENT STATUS: HCPCS

## 2018-10-17 PROCEDURE — G8998 SWALLOW D/C STATUS: HCPCS

## 2018-10-17 PROCEDURE — G8997 SWALLOW GOAL STATUS: HCPCS

## 2018-10-17 PROCEDURE — 74230 X-RAY XM SWLNG FUNCJ C+: CPT

## 2018-10-17 PROCEDURE — 92611 MOTION FLUOROSCOPY/SWALLOW: CPT

## 2018-10-17 PROCEDURE — 92526 ORAL FUNCTION THERAPY: CPT

## 2018-10-17 NOTE — PROCEDURES
anterior hyoid movement   -Decreased UES opening   -Mild/moderate pharyngeal stasis within vallecular space, pyriforms   -Trace stasis along posterior pharyngeal wall     Esophageal Phase  -Cricopharyngeal hypertrophy with intermittent trace backflow from the upper esophagus to the pyriform     Following Evaluation:  Results/recommendations and education given to Patient and nurse, who verbalized understanding    Timed Code Treatment:  0 minutes      Total Treatment Time:  45 minutes     Laura Hayden MA [facility] [place]  Speech Language Pathologist

## 2018-10-18 ENCOUNTER — HOSPITAL ENCOUNTER (OUTPATIENT)
Dept: SPEECH THERAPY | Age: 63
Setting detail: THERAPIES SERIES
Discharge: HOME | End: 2018-10-18
Payer: COMMERCIAL

## 2018-10-18 PROCEDURE — 92526 ORAL FUNCTION THERAPY: CPT

## 2018-10-18 NOTE — FLOWSHEET NOTE
Treatment Time: 55 minutes    Signature:    José Luis CALLOWAY  CCC-SLP S.PKarol [id number]  Speech-Language Pathologist

## 2018-10-23 ENCOUNTER — TELEPHONE (OUTPATIENT)
Dept: INFECTIOUS DISEASES | Age: 63
End: 2018-10-23

## 2018-10-23 ENCOUNTER — HOSPITAL ENCOUNTER (OUTPATIENT)
Dept: SPEECH THERAPY | Age: 63
Setting detail: THERAPIES SERIES
Discharge: HOME | End: 2018-10-23
Payer: COMMERCIAL

## 2018-10-23 PROCEDURE — 92526 ORAL FUNCTION THERAPY: CPT

## 2018-10-23 NOTE — FLOWSHEET NOTE
Speech-Language Pathology  Daily Treatment Note    Date:  10/23/2018    Patient Name:  Maryan Rater    :  1955  MRN: [id number]  Restrictions/Precautions:  Aspiration  Diagnosis:   Dysphagia; Aspiration into Airway (T17.908A)  Treatment Diagnosis:  Mild-Moderate Oropharyngeal Dysphagia  Insurance/Certification information: [facility] Choice Plus; 60 visits  Referring Physician:  Dr. Ann  of care signed (Y/N): Y; 10/11/18  Visit# / total visits:  3/12  Pain level: 0/10    G-Code (if applicable):      Date / Visit # G-Code Applied:  10/10/18  Swallow Current Status (): At least 20 percent but less than 40 percent impaired, limited or restricted  Swallow Goal Status (): At least 1 percent but less than 20 percent impaired, limited or restricted    Progress Note: []  Yes  [x]  No  Next due by: Visit #3/10     Subjective:  Patient alert, cooperative, and motivated to participate. Pt reports completing laryngeal/pharyngeal exercises daily as instructed. Objective:   Short-term Goals  1.) The patient will tolerate recommended diet without observed clinical signs of aspiration  -Pt tolerated 15/15 regular texture trials (daquan cracker) with no overt clinical s/s of aspiration/penetration  -Pt tolerated 21/21 trials of thin liquids with no overt clinical s/s of aspiration/penetration. 2.) The patient will tolerate thin liquids without signs and symptoms of aspiration 10/10 via straw. -Goal not addressed this tx session. 3.) The patient will tolerate instrumental swallowing procedure  GOAL MET    NEW GOAL: Patient will utilize appropriate compensatory swallow strategies with no cues. -Pt recalled alternating solids and liquids  -Education provided regarding use of double swallows to improve pharyngeal clearing as seen on recent Modified Barium Swallow study   -Pt required min cues to alternate solid and liquids and use double swallows consistently.     Other Treatment:  -

## 2018-10-25 ENCOUNTER — TELEPHONE (OUTPATIENT)
Dept: INFECTIOUS DISEASES | Age: 63
End: 2018-10-25

## 2018-10-25 ENCOUNTER — HOSPITAL ENCOUNTER (OUTPATIENT)
Dept: SPEECH THERAPY | Age: 63
Setting detail: THERAPIES SERIES
Discharge: HOME | End: 2018-10-25
Payer: COMMERCIAL

## 2018-10-25 DIAGNOSIS — B44.1 PULMONARY ASPERGILLOSIS (HCC): ICD-10-CM

## 2018-10-25 PROCEDURE — 92526 ORAL FUNCTION THERAPY: CPT

## 2018-10-25 RX ORDER — POSACONAZOLE 100 MG/1
300 TABLET, DELAYED RELEASE ORAL
Qty: 30 TABLET | Refills: 5 | Status: SHIPPED | OUTPATIENT
Start: 2018-10-25 | End: 2018-11-06 | Stop reason: SDUPTHER

## 2018-10-25 NOTE — FLOWSHEET NOTE
Speech-Language Pathology  Daily Treatment Note    Date:  10/25/2018    Patient Name:  Diego Brooke    :  1955  MRN: [id number]  Restrictions/Precautions:  Aspiration  Diagnosis:   Dysphagia; Aspiration into Airway (T17.908A)  Treatment Diagnosis:  Mild-Moderate Oropharyngeal Dysphagia  Insurance/Certification information: [facility] Choice Plus; 60 visits  Referring Physician:  Dr. Veronica Colby of care signed (Y/N): Y; 10/11/18  Visit# / total visits:    Pain level: 0/10    G-Code (if applicable):      Date / Visit # G-Code Applied:  10/10/18  Swallow Current Status (): At least 20 percent but less than 40 percent impaired, limited or restricted  Swallow Goal Status (): At least 1 percent but less than 20 percent impaired, limited or restricted    Progress Note: []  Yes  [x]  No  Next due by: Visit #4/10     Subjective:  Patient alert, cooperative, and motivated to participate. Pt reports completing laryngeal/pharyngeal exercises daily as instructed. Pt denies difficulty with current diet since previous tx session. Objective:   Short-term Goals  1.) The patient will tolerate recommended diet without observed clinical signs of aspiration  -Pt tolerated 9/9 regular texture trials (daquan cracker) with no overt clinical s/s of aspiration/penetration  -Pt tolerated 10/10 trials of thin liquids with no overt clinical s/s of aspiration/penetration.   -No report of sensation of bolus stuck throughout    2.) The patient will tolerate thin liquids without signs and symptoms of aspiration 10/10 via straw.  -Pt tolerated 14/15 trials of thin liquids via straw with no overt clinical s/s of aspiration/penetration. Of importance, pt with intermittent belching after the swallow that can be indicative of aspiration/penetration.  -Pt trained in use of bolus hold strategy to improve bolus control with use of straws.     3.) The patient will tolerate instrumental swallowing procedure  GOAL MET    NEW

## 2018-10-30 ENCOUNTER — HOSPITAL ENCOUNTER (OUTPATIENT)
Age: 63
Discharge: HOME | End: 2018-10-30
Payer: COMMERCIAL

## 2018-10-30 ENCOUNTER — OFFICE VISIT (OUTPATIENT)
Dept: INFECTIOUS DISEASES | Age: 63
End: 2018-10-30
Payer: COMMERCIAL

## 2018-10-30 VITALS
OXYGEN SATURATION: 95 % | SYSTOLIC BLOOD PRESSURE: 120 MMHG | TEMPERATURE: 97 F | WEIGHT: 91 LBS | DIASTOLIC BLOOD PRESSURE: 65 MMHG | BODY MASS INDEX: 15.54 KG/M2 | HEART RATE: 88 BPM | HEIGHT: 64 IN

## 2018-10-30 DIAGNOSIS — J44.9 CHRONIC OBSTRUCTIVE PULMONARY DISEASE, UNSPECIFIED COPD TYPE (HCC): ICD-10-CM

## 2018-10-30 DIAGNOSIS — Z51.81 THERAPEUTIC DRUG MONITORING: ICD-10-CM

## 2018-10-30 DIAGNOSIS — J84.10 GRANULOMATOUS LUNG DISEASE (HCC): ICD-10-CM

## 2018-10-30 DIAGNOSIS — F17.200 TOBACCO USE DISORDER: Chronic | ICD-10-CM

## 2018-10-30 DIAGNOSIS — B44.1 PULMONARY ASPERGILLOSIS (HCC): Primary | ICD-10-CM

## 2018-10-30 DIAGNOSIS — K21.9 GASTROESOPHAGEAL REFLUX DISEASE WITHOUT ESOPHAGITIS: ICD-10-CM

## 2018-10-30 DIAGNOSIS — R91.8 MULTIPLE LUNG NODULES ON CT: ICD-10-CM

## 2018-10-30 DIAGNOSIS — R63.4 WEIGHT LOSS: Chronic | ICD-10-CM

## 2018-10-30 DIAGNOSIS — Z71.6 TOBACCO ABUSE COUNSELING: ICD-10-CM

## 2018-10-30 DIAGNOSIS — B44.1 PULMONARY ASPERGILLOSIS (HCC): ICD-10-CM

## 2018-10-30 DIAGNOSIS — F31.9 BIPOLAR AFFECTIVE DISORDER, REMISSION STATUS UNSPECIFIED (HCC): ICD-10-CM

## 2018-10-30 LAB
A/G RATIO: 1.8 (ref 1.1–2.2)
ALBUMIN SERPL-MCNC: 4.3 G/DL (ref 3.4–5)
ALP BLD-CCNC: 81 U/L (ref 40–129)
ALT SERPL-CCNC: 47 U/L (ref 10–40)
ANION GAP SERPL CALCULATED.3IONS-SCNC: 13 MMOL/L (ref 3–16)
AST SERPL-CCNC: 30 U/L (ref 15–37)
BASOPHILS ABSOLUTE: 0.1 K/UL (ref 0–0.2)
BASOPHILS RELATIVE PERCENT: 1 %
BILIRUB SERPL-MCNC: 0.3 MG/DL (ref 0–1)
BUN BLDV-MCNC: 20 MG/DL (ref 7–20)
CALCIUM SERPL-MCNC: 9.6 MG/DL (ref 8.3–10.6)
CHLORIDE BLD-SCNC: 105 MMOL/L (ref 99–110)
CO2: 24 MMOL/L (ref 21–32)
CREAT SERPL-MCNC: 0.8 MG/DL (ref 0.8–1.3)
EOSINOPHILS ABSOLUTE: 0 K/UL (ref 0–0.6)
EOSINOPHILS RELATIVE PERCENT: 0 %
GFR AFRICAN AMERICAN: >60
GFR NON-AFRICAN AMERICAN: >60
GLOBULIN: 2.4 G/DL
GLUCOSE BLD-MCNC: 113 MG/DL (ref 70–99)
HCT VFR BLD CALC: 48.7 % (ref 40.5–52.5)
HEMOGLOBIN: 16.6 G/DL (ref 13.5–17.5)
LYMPHOCYTES ABSOLUTE: 1.2 K/UL (ref 1–5.1)
LYMPHOCYTES RELATIVE PERCENT: 11 %
MCH RBC QN AUTO: 32.2 PG (ref 26–34)
MCHC RBC AUTO-ENTMCNC: 34.1 G/DL (ref 31–36)
MCV RBC AUTO: 94.5 FL (ref 80–100)
MONOCYTES ABSOLUTE: 1.2 K/UL (ref 0–1.3)
MONOCYTES RELATIVE PERCENT: 11 %
MYELOCYTE PERCENT: 1 %
NEUTROPHILS ABSOLUTE: 8.2 K/UL (ref 1.7–7.7)
NEUTROPHILS RELATIVE PERCENT: 76 %
PDW BLD-RTO: 14.5 % (ref 12.4–15.4)
PLATELET # BLD: 254 K/UL (ref 135–450)
PMV BLD AUTO: 7.8 FL (ref 5–10.5)
POTASSIUM SERPL-SCNC: 4.7 MMOL/L (ref 3.5–5.1)
RBC # BLD: 5.15 M/UL (ref 4.2–5.9)
SLIDE REVIEW: ABNORMAL
SODIUM BLD-SCNC: 142 MMOL/L (ref 136–145)
TOTAL PROTEIN: 6.7 G/DL (ref 6.4–8.2)
WBC # BLD: 10.7 K/UL (ref 4–11)

## 2018-10-30 PROCEDURE — 1036F TOBACCO NON-USER: CPT | Performed by: INTERNAL MEDICINE

## 2018-10-30 PROCEDURE — G8926 SPIRO NO PERF OR DOC: HCPCS | Performed by: INTERNAL MEDICINE

## 2018-10-30 PROCEDURE — 3023F SPIROM DOC REV: CPT | Performed by: INTERNAL MEDICINE

## 2018-10-30 PROCEDURE — G8419 CALC BMI OUT NRM PARAM NOF/U: HCPCS | Performed by: INTERNAL MEDICINE

## 2018-10-30 PROCEDURE — 36415 COLL VENOUS BLD VENIPUNCTURE: CPT

## 2018-10-30 PROCEDURE — 80053 COMPREHEN METABOLIC PANEL: CPT

## 2018-10-30 PROCEDURE — 3017F COLORECTAL CA SCREEN DOC REV: CPT | Performed by: INTERNAL MEDICINE

## 2018-10-30 PROCEDURE — G8427 DOCREV CUR MEDS BY ELIG CLIN: HCPCS | Performed by: INTERNAL MEDICINE

## 2018-10-30 PROCEDURE — 80299 QUANTITATIVE ASSAY DRUG: CPT

## 2018-10-30 PROCEDURE — G8482 FLU IMMUNIZE ORDER/ADMIN: HCPCS | Performed by: INTERNAL MEDICINE

## 2018-10-30 PROCEDURE — 85025 COMPLETE CBC W/AUTO DIFF WBC: CPT

## 2018-10-30 PROCEDURE — 99213 OFFICE O/P EST LOW 20 MIN: CPT | Performed by: INTERNAL MEDICINE

## 2018-10-30 ASSESSMENT — ENCOUNTER SYMPTOMS
NAUSEA: 0
EYE DISCHARGE: 0
SORE THROAT: 0
CONSTIPATION: 0
TROUBLE SWALLOWING: 0
EYE REDNESS: 0
DIARRHEA: 0
BACK PAIN: 0
SHORTNESS OF BREATH: 0
ABDOMINAL PAIN: 0
WHEEZING: 0
COUGH: 0
RHINORRHEA: 0

## 2018-10-30 NOTE — LETTER
[facility] Infectious Disease  [address]  [place] [address] [address]  Phone: [phone]  Fax: [phone]    Martínez Hodge MD        October 30, 2018     Sabine Lorenz MD  [address] [place] [address]    Patient: Kinga Martinez  MR Number: [id number]  YOB: 1955  Date of Visit: 10/30/2018    Dear Dr. Sabine Lorenz:    Thank you for the request for consultation for [facility] to me for the evaluation of pulmonary aspergillosis. Below are the relevant portions of my assessment and plan of care. If you have questions, please do not hesitate to call me. I look forward to following Luzmaria Nuñez along with you.     Sincerely,        Martínez Hodge MD

## 2018-10-30 NOTE — PROGRESS NOTES
Infectious Diseases Oupatient Follow-up Note      Reason for Visit:               *Pulmonary aspergillosis  Primary Care Physician:  Joby Martin MD  History Obtained From:   Patient , Medical Records     CHIEF COMPLAINT:    Chief Complaint   Patient presents with    Follow-up     Pulmonary aspergillosis [facility])       INTERVAL HISTORY: Lyle Epley is a 57 y.o. male patient,who was seen today in ID clinic for follow-up. History was obtained from chart review and the patient. The patient was seen today for above mentioned complaints. The patient has history of COPD and broncholith in the right bronchus intermedius that was removed. Transbronchially at [facility]. The broncholith cultures are positive for Aspergillus fumigatus. The patient was started on voriconazole in June 2018 and had photosensitivity issues with that. I had started him on posaconazole during his last visit with me on 9/25/18 after reviewing the CT scan from 9/12/18 which showed new inflammatory nodules concerning for pulmonary aspergillosis. He also has a SPECT M nodule in the right upper lobe area. The patient comes today for a follow-up. Past Medical History:   Past medical and surgical history was reviewed by me during this visit in detail. Past Medical History:   Diagnosis Date    Aspergillus ([address] 05/18/2018    resp culture    Bipolar 1 disorder (HCC)     Chronic bronchitis, obstructive (HCC)     COPD (chronic obstructive pulmonary disease) (HCC)     GERD (gastroesophageal reflux disease)     Tourette's        Past Surgical History:    Past Surgical History:   Procedure Laterality Date    BRONCHOSCOPY N/A 05/18/2018    ENDOSCOPY, COLON, DIAGNOSTIC      KNEE ARTHROSCOPY      bilateral    TONSILLECTOMY      UPPER GASTROINTESTINAL ENDOSCOPY  9/25/15    VASECTOMY         Current Medications:    All medications were reviewed by me during this visit  Current Outpatient

## 2018-10-31 ENCOUNTER — HOSPITAL ENCOUNTER (OUTPATIENT)
Dept: SPEECH THERAPY | Age: 63
Setting detail: THERAPIES SERIES
Discharge: HOME | End: 2018-10-31
Payer: COMMERCIAL

## 2018-10-31 PROCEDURE — 92526 ORAL FUNCTION THERAPY: CPT

## 2018-11-01 ENCOUNTER — HOSPITAL ENCOUNTER (OUTPATIENT)
Dept: SPEECH THERAPY | Age: 63
Setting detail: THERAPIES SERIES
Discharge: HOME | End: 2018-11-01
Payer: COMMERCIAL

## 2018-11-01 PROCEDURE — 92526 ORAL FUNCTION THERAPY: CPT

## 2018-11-01 NOTE — FLOWSHEET NOTE
swallow strategies with no cues. -Pt recalled and utilized double swallows with thin liquids and alternating solids/liquids independently    Other Treatment:  - Neuromuscular electrical stimulation (VitalStim) was initiated in conjunction with dysphagia treatment via placement 3a,12.5 mA for 38 minutes, PO trials consumed including thin liquids x8 oz and regular solids x5.     -Pt instructed and trained in laryngeal/pharyngeal strengthening exercises including:   -Abigail maneuver x30 repetitions, independently   -Effortful swallows x30 repetitions, independently   -Supraglottic swallows x30 repetitions, independently   -Mendelsohn maneuver x30 repetitions, independently   -Modified Shaker (sitting up chin to chest with resistance) x10 repetitions, independently    Assessment:  Pt independently recalled and utilized strategies this date. Pt completing laryngeal/pharyngeal exercises independently. Recommend regular texture diet with thin liquids, meds in puree with use of double swallow and alternating solids/liquids. Pt would benefit from continued speech therapy to improve use of compensatory swallowing strategies to independent. Plan: Continue speech therapy for dysphagia treatment 2x week x4-6 weeks per plan of care. Timed Code Treatment: 0 minutes    Total Treatment Time: 47 minutes    Signature:    Caleb CALLOWAY CCC-SLP SKarolPKarol [id number]  Speech-Language Pathologist

## 2018-11-03 LAB — MISCELLANEOUS LAB TEST ORDER: NORMAL

## 2018-11-06 ENCOUNTER — HOSPITAL ENCOUNTER (OUTPATIENT)
Dept: SPEECH THERAPY | Age: 63
Setting detail: THERAPIES SERIES
Discharge: HOME | End: 2018-11-06
Payer: COMMERCIAL

## 2018-11-06 DIAGNOSIS — B44.1 PULMONARY ASPERGILLOSIS (HCC): Primary | ICD-10-CM

## 2018-11-06 PROCEDURE — 92526 ORAL FUNCTION THERAPY: CPT

## 2018-11-06 RX ORDER — POSACONAZOLE 100 MG/1
300 TABLET, DELAYED RELEASE ORAL
Qty: 270 TABLET | Refills: 2 | Status: SHIPPED | OUTPATIENT
Start: 2018-11-06 | End: 2019-02-04

## 2018-11-06 NOTE — FLOWSHEET NOTE
Speech-Language Pathology  Daily Treatment Note    Date:  2018    Patient Name:  En Aldana    :  1955  MRN: [id number]  Restrictions/Precautions:  Aspiration  Diagnosis:   Dysphagia; Aspiration into Airway (T17.908A)  Treatment Diagnosis:  Mild-Moderate Oropharyngeal Dysphagia  Insurance/Certification information: [facility] Choice Plus; 60 visits  Referring Physician:  Dr. Rodriguez Slider of care signed (Y/N): Y; 10/11/18  Visit# / total visits:    Pain level: 0/10    G-Code (if applicable):      Date / Visit # G-Code Applied:  10/10/18  Swallow Current Status (): At least 20 percent but less than 40 percent impaired, limited or restricted  Swallow Goal Status (): At least 1 percent but less than 20 percent impaired, limited or restricted    Progress Note: []  Yes  [x]  No  Next due by: Visit #7/10     Subjective:  Patient alert, cooperative, and motivated to participate. Pt reports no further coughing. Objective:   Short-term Goals  1.) The patient will tolerate recommended diet without observed clinical signs of aspiration  -Pt tolerated 10/10 regular texture trials (daquan cracker) with no overt clinical s/s of aspiration/penetration.  -Pt stated \"that double swallow really helps when eating and drinking.    2.) The patient will tolerate thin liquids without signs and symptoms of aspiration 10/10 via straw.  -Pt tolerated 20/20 trials of thin liquids via straw with no overt clinical s/s of aspiration/penetration. Pt independently used bolus hold and double swallows. 3.) The patient will tolerate instrumental swallowing procedure  GOAL MET    NEW GOAL: Patient will utilize appropriate compensatory swallow strategies with no cues.   -Pt recalled and utilized double swallows with thin liquids and alternating solids/liquids independently this date    Other Treatment:  - Neuromuscular electrical stimulation (VitalStim) was initiated in conjunction with dysphagia treatment via

## 2018-11-08 ENCOUNTER — HOSPITAL ENCOUNTER (OUTPATIENT)
Dept: SPEECH THERAPY | Age: 63
Setting detail: THERAPIES SERIES
Discharge: HOME | End: 2018-11-08
Payer: COMMERCIAL

## 2018-11-08 PROCEDURE — G8996 SWALLOW CURRENT STATUS: HCPCS

## 2018-11-08 PROCEDURE — G8997 SWALLOW GOAL STATUS: HCPCS

## 2018-11-08 PROCEDURE — 92526 ORAL FUNCTION THERAPY: CPT

## 2018-11-08 PROCEDURE — G8998 SWALLOW D/C STATUS: HCPCS

## 2018-11-08 NOTE — PROGRESS NOTES
on 10/17/2018:  \"Results indicate mild oropharyngeal dysphagia. No penetration or aspiration was viewed during this study. Pt did demonstrate delayed and reduced pharyngeal clearing with pharyngeal stasis post swallows. Mild/modereate stasis was assessed within the vallecular space with mild stasis viewed within the pyriforms post swallow. Trace stasis along the posterior pharyngeal wall was also assessed. Residue was non building, Although no penetration/aspiration was viewed during this study, pt demonstrates increased risk due to impaired pharyngeal clearing with stasis within the pharynx post swallow. Pt demonstrated reduced awareness for pharyngeal stasis with minimal attempts to clear. Pt appeared to benefit from alternating liquids and solids and from double swallows with liquids. \"     Assessment:  Summary: Pt has met established tx goals. At time of discharge, patient exhibiting mild oropharyngeal dysphagia characterized by delayed swallow initiation and suspected intermittent delayed pharyngeal clearing. Modified Barium Swallow study completed 10/17/18 with recommendation for Regular texture diet with thin liquids. Pt tolerating thin liquids via cup and straw and regular textures with no overt clinical s/s of aspiration/penetration after 100% of swallows. Recommend continue compensatory swallowing strategies for improved pharyngeal clearing including 1) upright for meals, 2) bolus hold, 3) small bites/sips, 4) double swallow with liquids, 5) alternating solids/liquids. Encouraged pt to continue to complete laryngeal/pharyngeal strengthening exercises 2x daily for 3-4 weeks. Pt verbalized understanding and agreement with recommendations. Discharge from speech therapy services completed this date.   Patient's response to treatment: Good    Progress towards goals:    Short-term Goals  1.) The patient will tolerate recommended diet without observed clinical signs of aspiration  -Pt tolerated 10/10 regular

## 2018-11-09 DIAGNOSIS — K21.9 GASTROESOPHAGEAL REFLUX DISEASE WITHOUT ESOPHAGITIS: ICD-10-CM

## 2018-11-12 RX ORDER — OMEPRAZOLE 20 MG/1
20 CAPSULE, DELAYED RELEASE ORAL DAILY
Qty: 90 CAPSULE | Refills: 2 | Status: SHIPPED | OUTPATIENT
Start: 2018-11-12 | End: 2019-08-20 | Stop reason: SDUPTHER

## 2018-11-13 ENCOUNTER — APPOINTMENT (OUTPATIENT)
Dept: SPEECH THERAPY | Age: 63
End: 2018-11-13
Payer: COMMERCIAL

## 2018-11-15 ENCOUNTER — APPOINTMENT (OUTPATIENT)
Dept: SPEECH THERAPY | Age: 63
End: 2018-11-15
Payer: COMMERCIAL

## 2018-11-20 ENCOUNTER — APPOINTMENT (OUTPATIENT)
Dept: SPEECH THERAPY | Age: 63
End: 2018-11-20
Payer: COMMERCIAL

## 2018-11-21 ENCOUNTER — APPOINTMENT (OUTPATIENT)
Dept: SPEECH THERAPY | Age: 63
End: 2018-11-21
Payer: COMMERCIAL

## 2018-11-27 ENCOUNTER — APPOINTMENT (OUTPATIENT)
Dept: SPEECH THERAPY | Age: 63
End: 2018-11-27
Payer: COMMERCIAL

## 2018-11-28 ENCOUNTER — NURSE ONLY (OUTPATIENT)
Dept: INTERNAL MEDICINE CLINIC | Age: 63
End: 2018-11-28
Payer: MEDICARE

## 2018-11-28 DIAGNOSIS — Z23 NEED FOR IMMUNIZATION AGAINST COMBINATION OF INFECTIOUS DISEASES: Primary | ICD-10-CM

## 2018-11-28 PROCEDURE — 90715 TDAP VACCINE 7 YRS/> IM: CPT | Performed by: INTERNAL MEDICINE

## 2018-11-28 PROCEDURE — 90471 IMMUNIZATION ADMIN: CPT | Performed by: INTERNAL MEDICINE

## 2018-12-04 ENCOUNTER — HOSPITAL ENCOUNTER (OUTPATIENT)
Dept: CT IMAGING | Age: 63
Discharge: HOME | End: 2018-12-04
Payer: MEDICARE

## 2018-12-04 DIAGNOSIS — R91.1 PULMONARY NODULE: ICD-10-CM

## 2018-12-04 PROCEDURE — 71250 CT THORAX DX C-: CPT

## 2018-12-06 ENCOUNTER — TELEPHONE (OUTPATIENT)
Dept: PULMONOLOGY | Age: 63
End: 2018-12-06

## 2018-12-06 NOTE — TELEPHONE ENCOUNTER
Patient calling for Results    What type of test? CT Chest WO Contrast    Who ordered the test? : Matilda Wright    When was it done? : 12/4/2018    Where was it done? : [address] [facility]    Pt # [phone]

## 2018-12-07 ENCOUNTER — OFFICE VISIT (OUTPATIENT)
Dept: PULMONOLOGY | Age: 63
End: 2018-12-07
Payer: MEDICARE

## 2018-12-07 VITALS
HEIGHT: 64 IN | OXYGEN SATURATION: 96 % | WEIGHT: 88 LBS | HEART RATE: 75 BPM | RESPIRATION RATE: 18 BRPM | BODY MASS INDEX: 15.03 KG/M2 | DIASTOLIC BLOOD PRESSURE: 74 MMHG | SYSTOLIC BLOOD PRESSURE: 128 MMHG

## 2018-12-07 DIAGNOSIS — J44.9 COPD, SEVERE (HCC): Primary | ICD-10-CM

## 2018-12-07 DIAGNOSIS — J43.2 CENTRILOBULAR EMPHYSEMA (HCC): ICD-10-CM

## 2018-12-07 DIAGNOSIS — R91.1 PULMONARY NODULE: ICD-10-CM

## 2018-12-07 PROCEDURE — G8926 SPIRO NO PERF OR DOC: HCPCS | Performed by: INTERNAL MEDICINE

## 2018-12-07 PROCEDURE — 3017F COLORECTAL CA SCREEN DOC REV: CPT | Performed by: INTERNAL MEDICINE

## 2018-12-07 PROCEDURE — 1123F ACP DISCUSS/DSCN MKR DOCD: CPT | Performed by: INTERNAL MEDICINE

## 2018-12-07 PROCEDURE — 99214 OFFICE O/P EST MOD 30 MIN: CPT | Performed by: INTERNAL MEDICINE

## 2018-12-07 PROCEDURE — 1036F TOBACCO NON-USER: CPT | Performed by: INTERNAL MEDICINE

## 2018-12-07 PROCEDURE — 4040F PNEUMOC VAC/ADMIN/RCVD: CPT | Performed by: INTERNAL MEDICINE

## 2018-12-07 PROCEDURE — G8482 FLU IMMUNIZE ORDER/ADMIN: HCPCS | Performed by: INTERNAL MEDICINE

## 2018-12-07 PROCEDURE — G8419 CALC BMI OUT NRM PARAM NOF/U: HCPCS | Performed by: INTERNAL MEDICINE

## 2018-12-07 PROCEDURE — G8427 DOCREV CUR MEDS BY ELIG CLIN: HCPCS | Performed by: INTERNAL MEDICINE

## 2018-12-07 PROCEDURE — 3023F SPIROM DOC REV: CPT | Performed by: INTERNAL MEDICINE

## 2018-12-07 PROCEDURE — 1101F PT FALLS ASSESS-DOCD LE1/YR: CPT | Performed by: INTERNAL MEDICINE

## 2018-12-18 ENCOUNTER — HOSPITAL ENCOUNTER (OUTPATIENT)
Dept: GENERAL RADIOLOGY | Age: 63
Discharge: HOME | End: 2018-12-18
Payer: MEDICARE

## 2018-12-18 ENCOUNTER — HOSPITAL ENCOUNTER (OUTPATIENT)
Dept: CT IMAGING | Age: 63
Discharge: HOME | End: 2018-12-18
Payer: MEDICARE

## 2018-12-18 VITALS
WEIGHT: 88 LBS | RESPIRATION RATE: 16 BRPM | HEART RATE: 67 BPM | BODY MASS INDEX: 15.03 KG/M2 | SYSTOLIC BLOOD PRESSURE: 110 MMHG | OXYGEN SATURATION: 97 % | HEIGHT: 64 IN | DIASTOLIC BLOOD PRESSURE: 77 MMHG | TEMPERATURE: 97 F

## 2018-12-18 DIAGNOSIS — R91.1 PULMONARY NODULE: ICD-10-CM

## 2018-12-18 LAB
ANION GAP SERPL CALCULATED.3IONS-SCNC: 9 MMOL/L (ref 3–16)
APTT: 30.7 SEC (ref 26–36)
BANDED NEUTROPHILS RELATIVE PERCENT: 3 % (ref 0–7)
BASOPHILS ABSOLUTE: 0 K/UL (ref 0–0.2)
BASOPHILS RELATIVE PERCENT: 0 %
BUN BLDV-MCNC: 21 MG/DL (ref 7–20)
CALCIUM SERPL-MCNC: 9.7 MG/DL (ref 8.3–10.6)
CHLORIDE BLD-SCNC: 103 MMOL/L (ref 99–110)
CO2: 28 MMOL/L (ref 21–32)
CREAT SERPL-MCNC: 0.8 MG/DL (ref 0.8–1.3)
EOSINOPHILS ABSOLUTE: 0 K/UL (ref 0–0.6)
EOSINOPHILS RELATIVE PERCENT: 0 %
GFR AFRICAN AMERICAN: >60
GFR NON-AFRICAN AMERICAN: >60
GLUCOSE BLD-MCNC: 114 MG/DL (ref 70–99)
HCT VFR BLD CALC: 53.6 % (ref 40.5–52.5)
HEMOGLOBIN: 17.9 G/DL (ref 13.5–17.5)
INR BLD: 0.99 (ref 0.86–1.14)
LYMPHOCYTES ABSOLUTE: 1.4 K/UL (ref 1–5.1)
LYMPHOCYTES RELATIVE PERCENT: 15 %
MCH RBC QN AUTO: 31.1 PG (ref 26–34)
MCHC RBC AUTO-ENTMCNC: 33.4 G/DL (ref 31–36)
MCV RBC AUTO: 93 FL (ref 80–100)
METAMYELOCYTES RELATIVE PERCENT: 1 %
MONOCYTES ABSOLUTE: 1.3 K/UL (ref 0–1.3)
MONOCYTES RELATIVE PERCENT: 14 %
NEUTROPHILS ABSOLUTE: 6.8 K/UL (ref 1.7–7.7)
NEUTROPHILS RELATIVE PERCENT: 67 %
PDW BLD-RTO: 14.2 % (ref 12.4–15.4)
PLATELET # BLD: 227 K/UL (ref 135–450)
PMV BLD AUTO: 7.5 FL (ref 5–10.5)
POTASSIUM SERPL-SCNC: 4.4 MMOL/L (ref 3.5–5.1)
PROTHROMBIN TIME: 11.3 SEC (ref 9.8–13)
RBC # BLD: 5.76 M/UL (ref 4.2–5.9)
RBC # BLD: NORMAL 10*6/UL
SODIUM BLD-SCNC: 140 MMOL/L (ref 136–145)
WBC # BLD: 9.6 K/UL (ref 4–11)

## 2018-12-18 PROCEDURE — 99152 MOD SED SAME PHYS/QHP 5/>YRS: CPT

## 2018-12-18 PROCEDURE — 71045 X-RAY EXAM CHEST 1 VIEW: CPT

## 2018-12-18 PROCEDURE — 85025 COMPLETE CBC W/AUTO DIFF WBC: CPT

## 2018-12-18 PROCEDURE — 80048 BASIC METABOLIC PNL TOTAL CA: CPT

## 2018-12-18 PROCEDURE — 85610 PROTHROMBIN TIME: CPT

## 2018-12-18 PROCEDURE — 7100000011 HC PHASE II RECOVERY - ADDTL 15 MIN

## 2018-12-18 PROCEDURE — 36415 COLL VENOUS BLD VENIPUNCTURE: CPT

## 2018-12-18 PROCEDURE — 88312 SPECIAL STAINS GROUP 1: CPT

## 2018-12-18 PROCEDURE — 85730 THROMBOPLASTIN TIME PARTIAL: CPT

## 2018-12-18 PROCEDURE — 6360000002 HC RX W HCPCS: Performed by: RADIOLOGY

## 2018-12-18 PROCEDURE — 7100000010 HC PHASE II RECOVERY - FIRST 15 MIN

## 2018-12-18 PROCEDURE — 88305 TISSUE EXAM BY PATHOLOGIST: CPT

## 2018-12-18 RX ORDER — ACETAMINOPHEN 325 MG/1
650 TABLET ORAL EVERY 4 HOURS PRN
Status: DISCONTINUED | OUTPATIENT
Start: 2018-12-18 | End: 2018-12-19 | Stop reason: HOSPADM

## 2018-12-18 RX ORDER — FENTANYL CITRATE 50 UG/ML
INJECTION, SOLUTION INTRAMUSCULAR; INTRAVENOUS
Status: COMPLETED | OUTPATIENT
Start: 2018-12-18 | End: 2018-12-18

## 2018-12-18 RX ORDER — MIDAZOLAM HYDROCHLORIDE 1 MG/ML
INJECTION INTRAMUSCULAR; INTRAVENOUS
Status: COMPLETED | OUTPATIENT
Start: 2018-12-18 | End: 2018-12-18

## 2018-12-18 RX ADMIN — FENTANYL CITRATE 50 MCG: 50 INJECTION INTRAMUSCULAR; INTRAVENOUS at 09:09

## 2018-12-18 RX ADMIN — MIDAZOLAM HYDROCHLORIDE 1 MG: 1 INJECTION INTRAMUSCULAR; INTRAVENOUS at 09:09

## 2018-12-18 ASSESSMENT — PAIN SCALES - GENERAL
PAINLEVEL_OUTOF10: 0
PAINLEVEL_OUTOF10: 0

## 2018-12-18 ASSESSMENT — PAIN - FUNCTIONAL ASSESSMENT: PAIN_FUNCTIONAL_ASSESSMENT: 0-10

## 2018-12-18 NOTE — PRE SEDATION
Sedation Pre-Procedure Note    Patient Name: Campos Grissom   YOB: 1955  Room/Bed: Room/bed info not found  Medical Record Number: [id number]  Date: 12/18/2018   Time: 8:41 AM       Indication:  RUL nodule    Consent: I have discussed with the patient and/or the patient representative the indication, alternatives, and the possible risks and/or complications of the planned procedure and the anesthesia methods. The patient and/or patient representative appear to understand and agree to proceed. Vital Signs:   Vitals:    12/18/18 0829   BP: (!) 144/81   Pulse: 76   Resp: 16   Temp: 97.7 °F (36.5 °C)   SpO2: 97%       Past Medical History:   has a past medical history of Aspergillus ([facility] [address] Bipolar 1 disorder ([facility] [address] Chronic bronchitis, obstructive (HCC); COPD (chronic obstructive pulmonary disease) ([facility] [address] GERD (gastroesophageal reflux disease); and Tourette's. Past Surgical History:   has a past surgical history that includes Knee arthroscopy; Vasectomy; Tonsillectomy; Endoscopy, colon, diagnostic; Upper gastrointestinal endoscopy (9/25/15); and bronchoscopy (N/A, 05/18/2018). Medications:   Scheduled Meds:   Continuous Infusions:   PRN Meds:   Home Meds:   Prior to Admission medications    Medication Sig Start Date End Date Taking? Authorizing Provider   tiotropium (SPIRIVA RESPIMAT) 2.5 MCG/ACT AERS inhaler Inhale 2 puffs into the lungs daily 11/12/18  Yes Mary Gonzalez MD   fluticasone-salmeterol (ADVAIR HFA) 230-21 MCG/ACT inhaler Inhale 2 puffs into the lungs 2 times daily 11/12/18  Yes Mary Gonzalez MD   omeprazole (PRILOSEC) 20 MG delayed release capsule TAKE 1 CAPSULE BY MOUTH  DAILY 11/12/18  Yes Janine Rondon MD   posaconazole (NOXAFIL) 100 MG TBEC tablet Take 3 tablets by mouth daily (with breakfast) 11/6/18 2/4/19 Yes Olga Etienne MD   benztropine (COGENTIN) 2 MG tablet Take 1 mg by mouth daily.    Yes Historical Provider, MD   paliperidone (INVEGA) 6 MG ER tablet Take 6 mg by mouth every morning. Yes Historical Provider, MD   PROAIR  (90 Base) MCG/ACT inhaler USE 2 PUFFS EVERY 6 HOURS  AS NEEDED FOR WHEEZING 4/16/18   Jerry Agarwal MD     Coumadin Use Last 7 Days:  no  Antiplatelet drug therapy use last 7 days: no  Other anticoagulant use last 7 days: no  Additional Medication Information:        Pre-Sedation Documentation and Exam:   I have reviewed the patient's history and review of systems.     Mallampati Airway Assessment:  Mallampati Class II - (soft palate, fauces & uvula are visible)    Prior History of Anesthesia Complications:   none    ASA Classification:  Class 3 - A patient with severe systemic disease that limits activity but is not incapacitating    Sedation/ Anesthesia Plan:   intravenous sedation    Medications Planned:   midazolam (Versed) intravenously and fentanyl intravenously    Patient is an appropriate candidate for plan of sedation: yes    Electronically signed by Gage Major MD on 12/18/2018 at 8:41 AM

## 2018-12-18 NOTE — TELEPHONE ENCOUNTER
Pt is using Advair / Spiriva and is getting them free from [facility] - can they switch the pt to [employer] / [address] combo instead?  Dr Jeff Alvarado please advise Complex Repair And Dorsal Nasal Flap Text: The defect edges were debeveled with a #15 scalpel blade.  The primary defect was closed partially with a complex linear closure.  Given the location of the remaining defect, shape of the defect and the proximity to free margins a dorsal nasal flap was deemed most appropriate for complete closure of the defect.  Using a sterile surgical marker, an appropriate flap was drawn incorporating the defect and placing the expected incisions within the relaxed skin tension lines where possible.    The area thus outlined was incised deep to adipose tissue with a #15 scalpel blade.  The skin margins were undermined to an appropriate distance in all directions utilizing iris scissors.

## 2018-12-18 NOTE — PROGRESS NOTES
Pt arrived to me in same day surgery for phase 2 recovery monitoring and care prior to d/c from Radiology/Special Procedures/Ultrasound in GOOD condition. Pt is alert and oriented X4. Report received from Radiology RN.     S/p R lung bx;posterior approach, with Dr. Marcello Trejo  Dressing: DSD, CDI  Pt may leave per MD order: [phone] CXR; home after resulted    Tabitha Londono RN, BSN, [facility]  Pre-Op/Recovery   Same Day Surgery

## 2018-12-21 ENCOUNTER — TELEPHONE (OUTPATIENT)
Dept: PULMONOLOGY | Age: 63
End: 2018-12-21

## 2018-12-21 DIAGNOSIS — R91.1 PULMONARY NODULE: Primary | ICD-10-CM

## 2019-01-03 ENCOUNTER — OFFICE VISIT (OUTPATIENT)
Dept: INTERNAL MEDICINE CLINIC | Age: 64
End: 2019-01-03
Payer: MEDICARE

## 2019-01-03 VITALS
BODY MASS INDEX: 15.31 KG/M2 | OXYGEN SATURATION: 96 % | WEIGHT: 89.2 LBS | DIASTOLIC BLOOD PRESSURE: 66 MMHG | HEART RATE: 86 BPM | SYSTOLIC BLOOD PRESSURE: 100 MMHG

## 2019-01-03 DIAGNOSIS — J01.90 ACUTE NON-RECURRENT SINUSITIS, UNSPECIFIED LOCATION: Primary | ICD-10-CM

## 2019-01-03 PROCEDURE — 4040F PNEUMOC VAC/ADMIN/RCVD: CPT | Performed by: INTERNAL MEDICINE

## 2019-01-03 PROCEDURE — G8482 FLU IMMUNIZE ORDER/ADMIN: HCPCS | Performed by: INTERNAL MEDICINE

## 2019-01-03 PROCEDURE — G8427 DOCREV CUR MEDS BY ELIG CLIN: HCPCS | Performed by: INTERNAL MEDICINE

## 2019-01-03 PROCEDURE — 1123F ACP DISCUSS/DSCN MKR DOCD: CPT | Performed by: INTERNAL MEDICINE

## 2019-01-03 PROCEDURE — 99213 OFFICE O/P EST LOW 20 MIN: CPT | Performed by: INTERNAL MEDICINE

## 2019-01-03 PROCEDURE — 1036F TOBACCO NON-USER: CPT | Performed by: INTERNAL MEDICINE

## 2019-01-03 PROCEDURE — 3017F COLORECTAL CA SCREEN DOC REV: CPT | Performed by: INTERNAL MEDICINE

## 2019-01-03 PROCEDURE — 1101F PT FALLS ASSESS-DOCD LE1/YR: CPT | Performed by: INTERNAL MEDICINE

## 2019-01-03 PROCEDURE — G8419 CALC BMI OUT NRM PARAM NOF/U: HCPCS | Performed by: INTERNAL MEDICINE

## 2019-01-03 RX ORDER — GUAIFENESIN 600 MG/1
600 TABLET, EXTENDED RELEASE ORAL 2 TIMES DAILY
Qty: 60 TABLET | Refills: 2 | Status: SHIPPED | OUTPATIENT
Start: 2019-01-03 | End: 2019-05-07

## 2019-01-03 RX ORDER — FLUTICASONE PROPIONATE 50 MCG
2 SPRAY, SUSPENSION (ML) NASAL DAILY
Qty: 1 BOTTLE | Refills: 2 | Status: SHIPPED | OUTPATIENT
Start: 2019-01-03 | End: 2019-05-07

## 2019-01-03 RX ORDER — DOXYCYCLINE HYCLATE 100 MG
100 TABLET ORAL 2 TIMES DAILY
Qty: 14 TABLET | Refills: 0 | Status: SHIPPED | OUTPATIENT
Start: 2019-01-03 | End: 2019-01-10

## 2019-01-03 ASSESSMENT — ENCOUNTER SYMPTOMS
EYES NEGATIVE: 1
WHEEZING: 0
GASTROINTESTINAL NEGATIVE: 1
RHINORRHEA: 1
COUGH: 1

## 2019-01-14 ENCOUNTER — OFFICE VISIT (OUTPATIENT)
Dept: PULMONOLOGY | Age: 64
End: 2019-01-14
Payer: MEDICARE

## 2019-01-14 VITALS
SYSTOLIC BLOOD PRESSURE: 123 MMHG | HEART RATE: 45 BPM | OXYGEN SATURATION: 94 % | BODY MASS INDEX: 15.03 KG/M2 | HEIGHT: 64 IN | WEIGHT: 88 LBS | RESPIRATION RATE: 18 BRPM | DIASTOLIC BLOOD PRESSURE: 77 MMHG

## 2019-01-14 DIAGNOSIS — R91.1 PULMONARY NODULE: ICD-10-CM

## 2019-01-14 DIAGNOSIS — J47.9 BRONCHIECTASIS WITHOUT COMPLICATION (HCC): ICD-10-CM

## 2019-01-14 DIAGNOSIS — B44.1 PULMONARY ASPERGILLOSIS (HCC): Primary | ICD-10-CM

## 2019-01-14 PROCEDURE — 3017F COLORECTAL CA SCREEN DOC REV: CPT | Performed by: INTERNAL MEDICINE

## 2019-01-14 PROCEDURE — 1036F TOBACCO NON-USER: CPT | Performed by: INTERNAL MEDICINE

## 2019-01-14 PROCEDURE — G8419 CALC BMI OUT NRM PARAM NOF/U: HCPCS | Performed by: INTERNAL MEDICINE

## 2019-01-14 PROCEDURE — 99214 OFFICE O/P EST MOD 30 MIN: CPT | Performed by: INTERNAL MEDICINE

## 2019-01-14 PROCEDURE — G8482 FLU IMMUNIZE ORDER/ADMIN: HCPCS | Performed by: INTERNAL MEDICINE

## 2019-01-14 PROCEDURE — 1101F PT FALLS ASSESS-DOCD LE1/YR: CPT | Performed by: INTERNAL MEDICINE

## 2019-01-14 PROCEDURE — 1123F ACP DISCUSS/DSCN MKR DOCD: CPT | Performed by: INTERNAL MEDICINE

## 2019-01-14 PROCEDURE — 4040F PNEUMOC VAC/ADMIN/RCVD: CPT | Performed by: INTERNAL MEDICINE

## 2019-01-14 PROCEDURE — G8427 DOCREV CUR MEDS BY ELIG CLIN: HCPCS | Performed by: INTERNAL MEDICINE

## 2019-02-04 ENCOUNTER — TELEPHONE (OUTPATIENT)
Dept: PULMONOLOGY | Age: 64
End: 2019-02-04

## 2019-02-04 ENCOUNTER — HOSPITAL ENCOUNTER (OUTPATIENT)
Dept: CT IMAGING | Age: 64
Discharge: HOME | End: 2019-02-04
Payer: COMMERCIAL

## 2019-02-04 DIAGNOSIS — R91.1 PULMONARY NODULE: ICD-10-CM

## 2019-02-04 DIAGNOSIS — T17.908A ASPIRATION INTO AIRWAY, INITIAL ENCOUNTER: Primary | ICD-10-CM

## 2019-02-04 PROCEDURE — 71250 CT THORAX DX C-: CPT

## 2019-02-04 RX ORDER — TIOTROPIUM BROMIDE INHALATION SPRAY 3.12 UG/1
SPRAY, METERED RESPIRATORY (INHALATION)
Qty: 3 G | Refills: 1 | Status: SHIPPED | OUTPATIENT
Start: 2019-02-04 | End: 2019-08-23 | Stop reason: SDUPTHER

## 2019-02-04 RX ORDER — FLUTICASONE PROPIONATE AND SALMETEROL XINAFOATE 230; 21 UG/1; UG/1
AEROSOL, METERED RESPIRATORY (INHALATION)
Qty: 3 INHALER | Refills: 1 | Status: SHIPPED | OUTPATIENT
Start: 2019-02-04 | End: 2019-08-23 | Stop reason: SDUPTHER

## 2019-02-05 ENCOUNTER — OFFICE VISIT (OUTPATIENT)
Dept: INFECTIOUS DISEASES | Age: 64
End: 2019-02-05
Payer: COMMERCIAL

## 2019-02-05 VITALS
HEART RATE: 93 BPM | HEIGHT: 64 IN | OXYGEN SATURATION: 97 % | SYSTOLIC BLOOD PRESSURE: 117 MMHG | BODY MASS INDEX: 15.06 KG/M2 | TEMPERATURE: 97.2 F | DIASTOLIC BLOOD PRESSURE: 83 MMHG | WEIGHT: 88.2 LBS

## 2019-02-05 DIAGNOSIS — J84.10 GRANULOMATOUS LUNG DISEASE (HCC): ICD-10-CM

## 2019-02-05 DIAGNOSIS — B44.1 PULMONARY ASPERGILLOSIS (HCC): Primary | ICD-10-CM

## 2019-02-05 DIAGNOSIS — F31.9 BIPOLAR AFFECTIVE DISORDER, REMISSION STATUS UNSPECIFIED (HCC): ICD-10-CM

## 2019-02-05 DIAGNOSIS — J47.9 BRONCHIECTASIS WITHOUT COMPLICATION (HCC): ICD-10-CM

## 2019-02-05 DIAGNOSIS — F17.200 SMOKER: ICD-10-CM

## 2019-02-05 DIAGNOSIS — Z71.6 TOBACCO ABUSE COUNSELING: ICD-10-CM

## 2019-02-05 DIAGNOSIS — R91.1 PULMONARY NODULE: ICD-10-CM

## 2019-02-05 DIAGNOSIS — Z71.89 ENCOUNTER FOR MEDICATION COUNSELING: ICD-10-CM

## 2019-02-05 DIAGNOSIS — J44.9 COPD, MILD (HCC): ICD-10-CM

## 2019-02-05 DIAGNOSIS — Z51.81 THERAPEUTIC DRUG MONITORING: ICD-10-CM

## 2019-02-05 PROCEDURE — 1123F ACP DISCUSS/DSCN MKR DOCD: CPT | Performed by: INTERNAL MEDICINE

## 2019-02-05 PROCEDURE — 99214 OFFICE O/P EST MOD 30 MIN: CPT | Performed by: INTERNAL MEDICINE

## 2019-02-05 PROCEDURE — 4040F PNEUMOC VAC/ADMIN/RCVD: CPT | Performed by: INTERNAL MEDICINE

## 2019-02-05 PROCEDURE — 3017F COLORECTAL CA SCREEN DOC REV: CPT | Performed by: INTERNAL MEDICINE

## 2019-02-05 PROCEDURE — 1101F PT FALLS ASSESS-DOCD LE1/YR: CPT | Performed by: INTERNAL MEDICINE

## 2019-02-05 PROCEDURE — G8482 FLU IMMUNIZE ORDER/ADMIN: HCPCS | Performed by: INTERNAL MEDICINE

## 2019-02-05 PROCEDURE — G8427 DOCREV CUR MEDS BY ELIG CLIN: HCPCS | Performed by: INTERNAL MEDICINE

## 2019-02-05 PROCEDURE — G8926 SPIRO NO PERF OR DOC: HCPCS | Performed by: INTERNAL MEDICINE

## 2019-02-05 PROCEDURE — 3023F SPIROM DOC REV: CPT | Performed by: INTERNAL MEDICINE

## 2019-02-05 PROCEDURE — 1036F TOBACCO NON-USER: CPT | Performed by: INTERNAL MEDICINE

## 2019-02-05 PROCEDURE — G8419 CALC BMI OUT NRM PARAM NOF/U: HCPCS | Performed by: INTERNAL MEDICINE

## 2019-02-05 RX ORDER — POSACONAZOLE 100 MG/1
300 TABLET, DELAYED RELEASE ORAL
COMMUNITY
End: 2019-02-05 | Stop reason: SDUPTHER

## 2019-02-05 RX ORDER — POSACONAZOLE 100 MG/1
200 TABLET, DELAYED RELEASE ORAL
Qty: 180 TABLET | Refills: 1 | Status: SHIPPED | OUTPATIENT
Start: 2019-02-05 | End: 2019-05-07 | Stop reason: SDUPTHER

## 2019-02-05 ASSESSMENT — ENCOUNTER SYMPTOMS
SORE THROAT: 0
WHEEZING: 0
TROUBLE SWALLOWING: 0
EYE REDNESS: 0
COUGH: 0
RHINORRHEA: 0
CONSTIPATION: 0
ABDOMINAL PAIN: 0
EYE DISCHARGE: 0
NAUSEA: 0
SHORTNESS OF BREATH: 0
DIARRHEA: 0
BACK PAIN: 0

## 2019-02-06 ENCOUNTER — HOSPITAL ENCOUNTER (OUTPATIENT)
Dept: SPEECH THERAPY | Age: 64
Setting detail: THERAPIES SERIES
Discharge: HOME | End: 2019-02-06
Payer: COMMERCIAL

## 2019-02-06 PROCEDURE — 92610 EVALUATE SWALLOWING FUNCTION: CPT

## 2019-02-08 ENCOUNTER — TELEPHONE (OUTPATIENT)
Dept: PULMONOLOGY | Age: 64
End: 2019-02-08

## 2019-02-08 DIAGNOSIS — T17.908D ASPIRATION INTO AIRWAY, SUBSEQUENT ENCOUNTER: Primary | ICD-10-CM

## 2019-02-11 ENCOUNTER — TELEPHONE (OUTPATIENT)
Dept: PULMONOLOGY | Age: 64
End: 2019-02-11

## 2019-02-11 ENCOUNTER — HOSPITAL ENCOUNTER (OUTPATIENT)
Dept: SPEECH THERAPY | Age: 64
Setting detail: THERAPIES SERIES
Discharge: HOME | End: 2019-02-11
Payer: COMMERCIAL

## 2019-02-11 PROCEDURE — 92526 ORAL FUNCTION THERAPY: CPT

## 2019-02-12 ENCOUNTER — TELEPHONE (OUTPATIENT)
Dept: PULMONOLOGY | Age: 64
End: 2019-02-12

## 2019-02-13 ENCOUNTER — TELEPHONE (OUTPATIENT)
Dept: PULMONOLOGY | Age: 64
End: 2019-02-13

## 2019-02-14 ENCOUNTER — HOSPITAL ENCOUNTER (OUTPATIENT)
Dept: GENERAL RADIOLOGY | Age: 64
Discharge: HOME | End: 2019-02-14
Payer: COMMERCIAL

## 2019-02-14 ENCOUNTER — HOSPITAL ENCOUNTER (OUTPATIENT)
Dept: SPEECH THERAPY | Age: 64
Setting detail: THERAPIES SERIES
Discharge: HOME | End: 2019-02-14
Payer: COMMERCIAL

## 2019-02-14 DIAGNOSIS — T17.908D ASPIRATION INTO AIRWAY, SUBSEQUENT ENCOUNTER: ICD-10-CM

## 2019-02-14 PROCEDURE — 92611 MOTION FLUOROSCOPY/SWALLOW: CPT

## 2019-02-14 PROCEDURE — 92526 ORAL FUNCTION THERAPY: CPT

## 2019-02-15 ENCOUNTER — ANESTHESIA EVENT (OUTPATIENT)
Dept: ENDOSCOPY | Age: 64
End: 2019-02-15
Payer: COMMERCIAL

## 2019-02-15 ENCOUNTER — TELEPHONE (OUTPATIENT)
Dept: PULMONOLOGY | Age: 64
End: 2019-02-15

## 2019-02-18 ENCOUNTER — OFFICE VISIT (OUTPATIENT)
Dept: PULMONOLOGY | Age: 64
End: 2019-02-18
Payer: COMMERCIAL

## 2019-02-18 VITALS
WEIGHT: 89 LBS | BODY MASS INDEX: 15.19 KG/M2 | OXYGEN SATURATION: 98 % | HEIGHT: 64 IN | HEART RATE: 60 BPM | DIASTOLIC BLOOD PRESSURE: 86 MMHG | RESPIRATION RATE: 18 BRPM | SYSTOLIC BLOOD PRESSURE: 137 MMHG

## 2019-02-18 DIAGNOSIS — R91.1 PULMONARY NODULE: ICD-10-CM

## 2019-02-18 DIAGNOSIS — J43.2 CENTRILOBULAR EMPHYSEMA (HCC): ICD-10-CM

## 2019-02-18 DIAGNOSIS — B44.1 PULMONARY ASPERGILLOSIS (HCC): ICD-10-CM

## 2019-02-18 DIAGNOSIS — J69.0 ASPIRATION PNEUMONIA OF RIGHT LOWER LOBE, UNSPECIFIED ASPIRATION PNEUMONIA TYPE (HCC): Primary | ICD-10-CM

## 2019-02-18 PROCEDURE — 99214 OFFICE O/P EST MOD 30 MIN: CPT | Performed by: INTERNAL MEDICINE

## 2019-02-18 PROCEDURE — 3017F COLORECTAL CA SCREEN DOC REV: CPT | Performed by: INTERNAL MEDICINE

## 2019-02-18 PROCEDURE — 1036F TOBACCO NON-USER: CPT | Performed by: INTERNAL MEDICINE

## 2019-02-18 PROCEDURE — G8926 SPIRO NO PERF OR DOC: HCPCS | Performed by: INTERNAL MEDICINE

## 2019-02-18 PROCEDURE — G8482 FLU IMMUNIZE ORDER/ADMIN: HCPCS | Performed by: INTERNAL MEDICINE

## 2019-02-18 PROCEDURE — 1123F ACP DISCUSS/DSCN MKR DOCD: CPT | Performed by: INTERNAL MEDICINE

## 2019-02-18 PROCEDURE — G8427 DOCREV CUR MEDS BY ELIG CLIN: HCPCS | Performed by: INTERNAL MEDICINE

## 2019-02-18 PROCEDURE — 3023F SPIROM DOC REV: CPT | Performed by: INTERNAL MEDICINE

## 2019-02-18 PROCEDURE — 4040F PNEUMOC VAC/ADMIN/RCVD: CPT | Performed by: INTERNAL MEDICINE

## 2019-02-18 PROCEDURE — G8419 CALC BMI OUT NRM PARAM NOF/U: HCPCS | Performed by: INTERNAL MEDICINE

## 2019-02-18 PROCEDURE — 1101F PT FALLS ASSESS-DOCD LE1/YR: CPT | Performed by: INTERNAL MEDICINE

## 2019-02-21 ENCOUNTER — HOSPITAL ENCOUNTER (OUTPATIENT)
Age: 64
Setting detail: OUTPATIENT SURGERY
Discharge: HOME | End: 2019-02-21
Attending: INTERNAL MEDICINE | Admitting: INTERNAL MEDICINE
Payer: COMMERCIAL

## 2019-02-21 ENCOUNTER — ANESTHESIA (OUTPATIENT)
Dept: ENDOSCOPY | Age: 64
End: 2019-02-21
Payer: COMMERCIAL

## 2019-02-21 VITALS
SYSTOLIC BLOOD PRESSURE: 101 MMHG | DIASTOLIC BLOOD PRESSURE: 65 MMHG | OXYGEN SATURATION: 96 % | RESPIRATION RATE: 14 BRPM

## 2019-02-21 VITALS
HEART RATE: 82 BPM | HEIGHT: 64 IN | RESPIRATION RATE: 18 BRPM | TEMPERATURE: 97.2 F | SYSTOLIC BLOOD PRESSURE: 118 MMHG | WEIGHT: 89.6 LBS | OXYGEN SATURATION: 100 % | BODY MASS INDEX: 15.3 KG/M2 | DIASTOLIC BLOOD PRESSURE: 72 MMHG

## 2019-02-21 PROCEDURE — 3700000001 HC ADD 15 MINUTES (ANESTHESIA): Performed by: INTERNAL MEDICINE

## 2019-02-21 PROCEDURE — 3700000000 HC ANESTHESIA ATTENDED CARE: Performed by: INTERNAL MEDICINE

## 2019-02-21 PROCEDURE — 6370000000 HC RX 637 (ALT 250 FOR IP): Performed by: ANESTHESIOLOGY

## 2019-02-21 PROCEDURE — 3609012500 HC EGD DILATION BALLOON: Performed by: INTERNAL MEDICINE

## 2019-02-21 PROCEDURE — 2500000003 HC RX 250 WO HCPCS: Performed by: NURSE ANESTHETIST, CERTIFIED REGISTERED

## 2019-02-21 PROCEDURE — 6360000002 HC RX W HCPCS: Performed by: NURSE ANESTHETIST, CERTIFIED REGISTERED

## 2019-02-21 PROCEDURE — 3609012400 HC EGD TRANSORAL BIOPSY SINGLE/MULTIPLE: Performed by: INTERNAL MEDICINE

## 2019-02-21 PROCEDURE — C1726 CATH, BAL DIL, NON-VASCULAR: HCPCS | Performed by: INTERNAL MEDICINE

## 2019-02-21 PROCEDURE — 7100000010 HC PHASE II RECOVERY - FIRST 15 MIN: Performed by: INTERNAL MEDICINE

## 2019-02-21 PROCEDURE — 7100000011 HC PHASE II RECOVERY - ADDTL 15 MIN: Performed by: INTERNAL MEDICINE

## 2019-02-21 PROCEDURE — 2580000003 HC RX 258: Performed by: ANESTHESIOLOGY

## 2019-02-21 PROCEDURE — 2709999900 HC NON-CHARGEABLE SUPPLY: Performed by: INTERNAL MEDICINE

## 2019-02-21 RX ORDER — SODIUM CHLORIDE 0.9 % (FLUSH) 0.9 %
10 SYRINGE (ML) INJECTION EVERY 12 HOURS SCHEDULED
Status: DISCONTINUED | OUTPATIENT
Start: 2019-02-21 | End: 2019-02-21 | Stop reason: HOSPADM

## 2019-02-21 RX ORDER — LIDOCAINE HYDROCHLORIDE 20 MG/ML
INJECTION, SOLUTION EPIDURAL; INFILTRATION; INTRACAUDAL; PERINEURAL PRN
Status: DISCONTINUED | OUTPATIENT
Start: 2019-02-21 | End: 2019-02-21 | Stop reason: SDUPTHER

## 2019-02-21 RX ORDER — SODIUM CHLORIDE 0.9 % (FLUSH) 0.9 %
10 SYRINGE (ML) INJECTION PRN
Status: DISCONTINUED | OUTPATIENT
Start: 2019-02-21 | End: 2019-02-21 | Stop reason: HOSPADM

## 2019-02-21 RX ORDER — LIDOCAINE HYDROCHLORIDE 10 MG/ML
1 INJECTION, SOLUTION EPIDURAL; INFILTRATION; INTRACAUDAL; PERINEURAL
Status: DISCONTINUED | OUTPATIENT
Start: 2019-02-21 | End: 2019-02-21 | Stop reason: HOSPADM

## 2019-02-21 RX ORDER — PROPOFOL 10 MG/ML
INJECTION, EMULSION INTRAVENOUS PRN
Status: DISCONTINUED | OUTPATIENT
Start: 2019-02-21 | End: 2019-02-21 | Stop reason: SDUPTHER

## 2019-02-21 RX ORDER — IPRATROPIUM BROMIDE AND ALBUTEROL SULFATE 2.5; .5 MG/3ML; MG/3ML
1 SOLUTION RESPIRATORY (INHALATION)
Status: DISCONTINUED | OUTPATIENT
Start: 2019-02-21 | End: 2019-02-21 | Stop reason: HOSPADM

## 2019-02-21 RX ORDER — BENZTROPINE MESYLATE 0.5 MG/1
0.5 TABLET ORAL DAILY
COMMUNITY
End: 2019-05-07

## 2019-02-21 RX ORDER — SODIUM CHLORIDE 9 MG/ML
INJECTION, SOLUTION INTRAVENOUS CONTINUOUS
Status: DISCONTINUED | OUTPATIENT
Start: 2019-02-21 | End: 2019-02-21 | Stop reason: HOSPADM

## 2019-02-21 RX ADMIN — PROPOFOL 50 MG: 10 INJECTION, EMULSION INTRAVENOUS at 10:32

## 2019-02-21 RX ADMIN — IPRATROPIUM BROMIDE AND ALBUTEROL SULFATE 1 AMPULE: .5; 3 SOLUTION RESPIRATORY (INHALATION) at 10:12

## 2019-02-21 RX ADMIN — PROPOFOL 20 MG: 10 INJECTION, EMULSION INTRAVENOUS at 10:40

## 2019-02-21 RX ADMIN — PROPOFOL 20 MG: 10 INJECTION, EMULSION INTRAVENOUS at 10:36

## 2019-02-21 RX ADMIN — PROPOFOL 50 MG: 10 INJECTION, EMULSION INTRAVENOUS at 10:30

## 2019-02-21 RX ADMIN — PROPOFOL 20 MG: 10 INJECTION, EMULSION INTRAVENOUS at 10:38

## 2019-02-21 RX ADMIN — LIDOCAINE HYDROCHLORIDE 100 MG: 20 INJECTION, SOLUTION EPIDURAL; INFILTRATION; INTRACAUDAL; PERINEURAL at 10:27

## 2019-02-21 RX ADMIN — PROPOFOL 20 MG: 10 INJECTION, EMULSION INTRAVENOUS at 10:43

## 2019-02-21 RX ADMIN — PROPOFOL 100 MG: 10 INJECTION, EMULSION INTRAVENOUS at 10:28

## 2019-02-21 RX ADMIN — SODIUM CHLORIDE: 9 INJECTION, SOLUTION INTRAVENOUS at 10:12

## 2019-02-21 RX ADMIN — PROPOFOL 20 MG: 10 INJECTION, EMULSION INTRAVENOUS at 10:34

## 2019-02-21 RX ADMIN — PROPOFOL 20 MG: 10 INJECTION, EMULSION INTRAVENOUS at 10:42

## 2019-02-21 RX ADMIN — PROPOFOL 20 MG: 10 INJECTION, EMULSION INTRAVENOUS at 10:46

## 2019-02-21 ASSESSMENT — PAIN SCALES - GENERAL
PAINLEVEL_OUTOF10: 0

## 2019-02-21 ASSESSMENT — PAIN - FUNCTIONAL ASSESSMENT: PAIN_FUNCTIONAL_ASSESSMENT: 0-10

## 2019-02-28 ENCOUNTER — HOSPITAL ENCOUNTER (OUTPATIENT)
Dept: SPEECH THERAPY | Age: 64
Setting detail: THERAPIES SERIES
Discharge: HOME | End: 2019-02-28
Payer: COMMERCIAL

## 2019-02-28 ENCOUNTER — TELEPHONE (OUTPATIENT)
Dept: PULMONOLOGY | Age: 64
End: 2019-02-28

## 2019-02-28 PROCEDURE — 92526 ORAL FUNCTION THERAPY: CPT

## 2019-03-07 ENCOUNTER — HOSPITAL ENCOUNTER (OUTPATIENT)
Dept: SPEECH THERAPY | Age: 64
Setting detail: THERAPIES SERIES
Discharge: HOME | End: 2019-03-07
Payer: COMMERCIAL

## 2019-03-07 PROCEDURE — 92526 ORAL FUNCTION THERAPY: CPT

## 2019-03-14 ENCOUNTER — HOSPITAL ENCOUNTER (OUTPATIENT)
Dept: SPEECH THERAPY | Age: 64
Setting detail: THERAPIES SERIES
Discharge: HOME | End: 2019-03-14
Payer: COMMERCIAL

## 2019-03-14 PROCEDURE — 92526 ORAL FUNCTION THERAPY: CPT

## 2019-03-21 ENCOUNTER — TELEPHONE (OUTPATIENT)
Dept: PULMONOLOGY | Age: 64
End: 2019-03-21

## 2019-03-21 DIAGNOSIS — R91.1 PULMONARY NODULE: Primary | ICD-10-CM

## 2019-05-07 ENCOUNTER — OFFICE VISIT (OUTPATIENT)
Dept: INFECTIOUS DISEASES | Age: 64
End: 2019-05-07
Payer: COMMERCIAL

## 2019-05-07 ENCOUNTER — TELEPHONE (OUTPATIENT)
Dept: INFECTIOUS DISEASES | Age: 64
End: 2019-05-07

## 2019-05-07 VITALS
BODY MASS INDEX: 15.31 KG/M2 | SYSTOLIC BLOOD PRESSURE: 102 MMHG | DIASTOLIC BLOOD PRESSURE: 70 MMHG | RESPIRATION RATE: 18 BRPM | WEIGHT: 89.2 LBS | OXYGEN SATURATION: 98 % | HEART RATE: 88 BPM

## 2019-05-07 DIAGNOSIS — J84.10 GRANULOMATOUS LUNG DISEASE (HCC): ICD-10-CM

## 2019-05-07 DIAGNOSIS — K21.9 GASTROESOPHAGEAL REFLUX DISEASE WITHOUT ESOPHAGITIS: ICD-10-CM

## 2019-05-07 DIAGNOSIS — F17.200 TOBACCO USE DISORDER: ICD-10-CM

## 2019-05-07 DIAGNOSIS — B44.1 PULMONARY ASPERGILLOSIS (HCC): Primary | ICD-10-CM

## 2019-05-07 DIAGNOSIS — R91.1 PULMONARY NODULE: ICD-10-CM

## 2019-05-07 DIAGNOSIS — F17.200 SMOKER: ICD-10-CM

## 2019-05-07 DIAGNOSIS — R91.8 MULTIPLE LUNG NODULES ON CT: ICD-10-CM

## 2019-05-07 DIAGNOSIS — Z71.6 TOBACCO ABUSE COUNSELING: ICD-10-CM

## 2019-05-07 DIAGNOSIS — F31.9 BIPOLAR AFFECTIVE DISORDER, REMISSION STATUS UNSPECIFIED (HCC): ICD-10-CM

## 2019-05-07 DIAGNOSIS — Z51.81 THERAPEUTIC DRUG MONITORING: ICD-10-CM

## 2019-05-07 DIAGNOSIS — J44.9 COPD, MILD (HCC): ICD-10-CM

## 2019-05-07 DIAGNOSIS — J44.9 CHRONIC OBSTRUCTIVE PULMONARY DISEASE, UNSPECIFIED COPD TYPE (HCC): ICD-10-CM

## 2019-05-07 PROCEDURE — G8428 CUR MEDS NOT DOCUMENT: HCPCS | Performed by: INTERNAL MEDICINE

## 2019-05-07 PROCEDURE — 99214 OFFICE O/P EST MOD 30 MIN: CPT | Performed by: INTERNAL MEDICINE

## 2019-05-07 PROCEDURE — 3017F COLORECTAL CA SCREEN DOC REV: CPT | Performed by: INTERNAL MEDICINE

## 2019-05-07 PROCEDURE — 3023F SPIROM DOC REV: CPT | Performed by: INTERNAL MEDICINE

## 2019-05-07 PROCEDURE — 1123F ACP DISCUSS/DSCN MKR DOCD: CPT | Performed by: INTERNAL MEDICINE

## 2019-05-07 PROCEDURE — 4040F PNEUMOC VAC/ADMIN/RCVD: CPT | Performed by: INTERNAL MEDICINE

## 2019-05-07 PROCEDURE — G8926 SPIRO NO PERF OR DOC: HCPCS | Performed by: INTERNAL MEDICINE

## 2019-05-07 PROCEDURE — G8419 CALC BMI OUT NRM PARAM NOF/U: HCPCS | Performed by: INTERNAL MEDICINE

## 2019-05-07 PROCEDURE — 1036F TOBACCO NON-USER: CPT | Performed by: INTERNAL MEDICINE

## 2019-05-07 RX ORDER — BENZTROPINE MESYLATE 1 MG/1
1 TABLET ORAL DAILY
COMMUNITY
Start: 2018-03-18 | End: 2020-07-21 | Stop reason: ALTCHOICE

## 2019-05-07 RX ORDER — POSACONAZOLE 100 MG/1
200 TABLET, DELAYED RELEASE ORAL
Qty: 180 TABLET | Refills: 2 | Status: SHIPPED | OUTPATIENT
Start: 2019-05-07 | End: 2019-12-19 | Stop reason: SDUPTHER

## 2019-05-07 ASSESSMENT — ENCOUNTER SYMPTOMS
DIARRHEA: 0
RHINORRHEA: 0
ABDOMINAL PAIN: 0
EYE REDNESS: 0
EYE DISCHARGE: 0
SHORTNESS OF BREATH: 0
TROUBLE SWALLOWING: 0
BACK PAIN: 0
NAUSEA: 0
COUGH: 0
WHEEZING: 0
CONSTIPATION: 0
SORE THROAT: 0

## 2019-05-07 NOTE — PROGRESS NOTES
Infectious Diseases Oupatient Follow-up Note      Reason for Visit:               Pulmonary aspergillosis, therapeutic drug monitoring  Primary Care Physician:  La Nena Alonzo MD  History Obtained From:   Patient , Medical Records     CHIEF COMPLAINT:    Chief Complaint   Patient presents with    3 Month Follow-Up     Pulmonary aspergillosis       INTERVAL HISTORY: Mike Miles is a 70 y.o. male patient,who was seen today in ID clinic for follow-up. History was obtained from chart review and the patient. The patient was seen today for above mentioned complaints. The patient has COPD and has a broncholith in the right bronchus intermedius that was removed. Transbronchially and [facility]. The cultures from [facility] for positive for Aspergillus fumigatus. The patient was started on voriconazole in June 2018 and had photosensitivity issues with that. I had switched him to oral posaconazole on 9/25/18, which he has been tolerating it okay. I had reduced his posaconazole dose from 300 mg daily to 200 mg daily with breakfast during his last visit on February 5, 2019 based on his blood posaconazole level. He comes today for follow-up. Past Medical History:   Past medical and surgical history was reviewed by me during this visit in detail.     Past Medical History:   Diagnosis Date    Arthritis     Aspergillus ([address] 05/18/2018    resp culture    Bipolar 1 disorder (HCC)     Chronic bronchitis, obstructive (HCC)     COPD (chronic obstructive pulmonary disease) (HCC)     GERD (gastroesophageal reflux disease)     Tourette's        Past Surgical History:    Past Surgical History:   Procedure Laterality Date    BRONCHOSCOPY N/A 05/18/2018    ENDOSCOPY, COLON, DIAGNOSTIC      KNEE ARTHROSCOPY      bilateral    LUNG BIOPSY  12/2018    TONSILLECTOMY      UPPER GASTROINTESTINAL ENDOSCOPY  9/25/15    UPPER GASTROINTESTINAL ENDOSCOPY N/A 2/21/2019 EGD DILATION BALLOON performed by Cristiano Mena MD at [address] [place] [address] N/A 2/21/2019    EGD BIOPSY performed by Cristiano Mena MD at [facility]         Current Medications: All medications were reviewed by me during this visit  Current Outpatient Medications   Medication Sig Dispense Refill    benztropine (COGENTIN) 1 MG tablet Take 1 mg by mouth      posaconazole (NOXAFIL) 100 MG TBEC tablet Take 2 tablets by mouth daily (with breakfast) 180 tablet 2    SPIRIVA RESPIMAT 2.5 MCG/ACT AERS inhaler USE 2 PUFFS DAILY 3 g 1    ADVAIR -21 MCG/ACT inhaler USE 2 PUFFS TWO TIMES DAILY 3 Inhaler 1    omeprazole (PRILOSEC) 20 MG delayed release capsule TAKE 1 CAPSULE BY MOUTH  DAILY 90 capsule 2    PROAIR  (90 Base) MCG/ACT inhaler USE 2 PUFFS EVERY 6 HOURS  AS NEEDED FOR WHEEZING 3 Inhaler 0    paliperidone (INVEGA) 6 MG ER tablet Take 6 mg by mouth every morning. No current facility-administered medications for this visit. Family history: All family history was reviewed by me today    Family History   Problem Relation Age of Onset    Arthritis Mother     Glaucoma Mother     High Blood Pressure Mother     Heart Disease Father         age [de-identified] COPD Father         emphysema    COPD Brother        No family history of immunocompromising or autoimmune conditions. No h/o TB in in family or contacts    SocialHistory:  All social and epidemiologic history was reviewed and updated be me in detail today.     Social History     Socioeconomic History    Marital status:      Spouse name: None    Number of children: 1    Years of education: None    Highest education level: None   Occupational History    None   Social Needs    Financial resource strain: None    Food insecurity:     Worry: None     Inability: None    Transportation needs:     Medical: None     Non-medical: None   Tobacco Use    Smoking status: Former Smoker     Packs/day: 2.00     Years: 35.00     Pack years: 70.00     Types: Cigarettes     Last attempt to quit: 2013     Years since quittin.3    Smokeless tobacco: Never Used   Substance and Sexual Activity    Alcohol use: No     Alcohol/week: 0.0 oz     Comment: 24 yrs sober    Drug use: No    Sexual activity: Not Currently   Lifestyle    Physical activity:     Days per week: None     Minutes per session: None    Stress: None   Relationships    Social connections:     Talks on phone: None     Gets together: None     Attends Restoration service: None     Active member of club or organization: None     Attends meetings of clubs or organizations: None     Relationship status: None    Intimate partner violence:     Fear of current or ex partner: None     Emotionally abused: None     Physically abused: None     Forced sexual activity: None   Other Topics Concern    None   Social History Narrative    Lives home with wife. Immunizations: All immunizations were reviewed byme in detail. Immunization History   Administered Date(s) Administered    Influenza, Quadv, 3 yrs and older, IM, PF (Fluzone 3 yrs and older or Afluria 5 yrs and older) 10/15/2018    Pneumococcal 13-valent Conjugate ([id number]) 2018    Pneumococcal Polysaccharide ([id number]) 10/15/2018    Tdap (Boostrix, Adacel) 2018         REVIEW OF SYSTEMS:      Review of Systems   Constitutional: Negative for chills, diaphoresis and fever. HENT: Negative for ear discharge, ear pain, rhinorrhea, sore throat and trouble swallowing. Eyes: Negative for discharge and redness. Respiratory: Negative for cough, shortness of breath and wheezing. Cardiovascular: Negative for chest pain and leg swelling. Gastrointestinal: Negative for abdominal pain, constipation, diarrhea and nausea. Endocrine: Negative for polyuria. Genitourinary: Negative for dysuria, flank pain, frequency, hematuria and urgency. Musculoskeletal: Negative for back pain and myalgias. Skin: Negative for rash. Neurological: Negative for dizziness, seizures and headaches. Hematological: Does not bruise/bleed easily. Psychiatric/Behavioral: Negative for hallucinations and suicidal ideas. All other systems reviewed and are negative. PHYSICAL EXAM:      Vitals:    05/07/19 0920   BP: 102/70   Pulse: 88   Resp: 18   SpO2: 98%       Physical Exam   Constitutional: He is oriented to person, place, and time. He appears well-developed. HENT:   Head: Normocephalic and atraumatic. Mouth/Throat: Oropharynx is clear and moist. No oropharyngeal exudate. Eyes: Pupils are equal, round, and reactive to light. Conjunctivae and EOM are normal. Right eye exhibits no discharge. Left eye exhibits no discharge. No scleral icterus. Neck: Normal range of motion. Neck supple. Cardiovascular: Normal rate and regular rhythm. Exam reveals no friction rub. No murmur heard. Pulmonary/Chest: No stridor. No respiratory distress. He has no wheezes. He has no rales. Abdominal: Soft. Bowel sounds are normal. There is no tenderness. There is no rebound and no guarding. Musculoskeletal: Normal range of motion. He exhibits no edema or tenderness. Lymphadenopathy:     He has no cervical adenopathy. He has no axillary adenopathy. Neurological: He is alert and oriented to person, place, and time. He exhibits normal muscle tone. Skin: Skin is warm and dry. No rash noted. He is not diaphoretic. No erythema. Psychiatric: He has a normal mood and affect. His behavior is normal.   Nursing note and vitals reviewed.           DATA:  All available lab data wasreviewed by me during this visit    Last CBC:  Lab Results   Component Value Date    WBC 9.6 12/18/2018    HGB 17.9 (H) 12/18/2018    HCT 53.6 (H) 12/18/2018    MCV 93.0 12/18/2018     12/18/2018    LYMPHOPCT 15.0 12/18/2018    RBC 5.76 12/18/2018    MCH 31.1 12/18/2018    MCHC 33.4 12/18/2018    RDW 14.2 12/18/2018       Last BMP:  Lab Results   Component Value Date     12/18/2018    K 4.4 12/18/2018    K 3.7 01/23/2018     12/18/2018    CO2 28 12/18/2018    BUN 21 12/18/2018    CREATININE 0.8 12/18/2018    GLUCOSE 114 12/18/2018    CALCIUM 9.7 12/18/2018        Hepatic Function Panel:  Lab Results   Component Value Date    ALKPHOS 81 10/30/2018    ALT 47 10/30/2018    AST 30 10/30/2018    PROT 6.7 10/30/2018    PROT 6.6 08/27/2012    BILITOT 0.3 10/30/2018    BILIDIR 0.11 08/27/2012    IBILI 0.2 08/27/2012    LABALBU 4.3 10/30/2018       Last CK: No results found for: CKTOTAL    Last ESR:  No results found for: SEDRATE    Last CRP:  No results found for: CRP      Imaging: All pertinent images and reports for the current visit were reviewed by me during this visit. Outside records:    Labs, Microbiology,Radiology and pertinent results from Care everywhere, if available, were reviewed as a part of the consultation. Allergies:   All allergies data was reviewed by me during this visit  Pcn [penicillins]    Microbiology:   All available micro data was reviewed by me during this visit      Problem list:       Patient Active Problem List   Diagnosis Code    Bronchitis J40    Chronic obstructive pulmonary disease (HCC) J44.9    Bipolar affective disorder ([facility] F31.9    Smoking F17.200    Weight loss R63.4    COPD exacerbation ([address] J44.1    Hypoxemia R09.02    Weight loss R63.4    Tobacco use disorder F17.200    Pulmonary emphysema ([facility] J43.9    Pulmonary nodule R91.1    Granulomatous lung disease ([facility] J84.10    Asthma J45.909    Imbalance R26.89    Transient cerebral ischemia G45.9    Slurred speech R47.81    Gastroesophageal reflux disease without esophagitis K21.9    SIRS (systemic inflammatory response syndrome) ([facility]) R65.10    Dysarthria R47.1    Dizziness R42    Gait abnormality R26.9    Aphasia R47.01    Nausea vomiting and diarrhea R11.2, R19.7  Blood culture positive for microorganism R79.89    Tobacco abuse counseling Z71.6    Diarrhea R19.7    Abnormal CT of the chest R93.89    COPD, mild ([facility]) J44.9    Bronchiectasis without complication ([facility]) M96.0    Pulmonary aspergillosis ([facility]) B44.1    Centrilobular emphysema ([facility]) J43.2         IMPRESSION:    The patient is a 70 y.o. old male who  has a past medical history of Arthritis, Aspergillus ([address] (05/18/2018), Bipolar 1 disorder ([address] Chronic bronchitis, obstructive ([address] COPD (chronic obstructive pulmonary disease) ([address] GERD (gastroesophageal reflux disease), and Tourette's. was seen today for following problems:    1. Pulmonary aspergillosis ([address]    2. Granulomatous lung disease ([address]    3. Pulmonary nodule    4. Smoker    5. Tobacco abuse counseling    6. Bipolar affective disorder, remission status unspecified ([address]    7. Gastroesophageal reflux disease without esophagitis    8. Chronic obstructive pulmonary disease, unspecified COPD type ([address]    9. Multiple lung nodules on CT    10. Therapeutic drug monitoring    11. Tobacco use disorder    12. COPD, mild ([address]        Discussion:      The patient is on oral posaconazole 200 mg daily for pulmonary aspergillosis. Last CT scan of the chest was done ion 2/4/19, which was showing slight improvement in the right upper lobe lung nodule on the right side.   He, however, had a new patchy consolidation in the lower lobe      RECOMMENDATIONS:      Diagnostic Workup:    · Will order CBC and CMP today  · Will get fresh posaconazole level - order reprinted  · Will get CT scan of the chest without contrast to be done before next visit - he is scheduled for one on [address]  · Continue to follow fever curve  at home      Antimicrobials:    · Will continue oral posaconazole 200 mg daily  · Discussed the importance of taking the medication with full meal to ensure good portion of the medication  · Discussed the importance of compliance with antifungal

## 2019-05-07 NOTE — TELEPHONE ENCOUNTER
Patient was seen today and his Noxafil was reordered. He requested that we send that rx to his long term Pharmacy [facility]. I contacted his local pharmacy to notify them that we were sending it to [facility] and called [facility] and ordered it verbally.

## 2019-06-03 ENCOUNTER — HOSPITAL ENCOUNTER (OUTPATIENT)
Dept: CT IMAGING | Age: 64
Discharge: HOME | End: 2019-06-03
Payer: COMMERCIAL

## 2019-06-03 DIAGNOSIS — R91.1 PULMONARY NODULE: ICD-10-CM

## 2019-06-03 PROCEDURE — 71250 CT THORAX DX C-: CPT

## 2019-06-05 ENCOUNTER — OFFICE VISIT (OUTPATIENT)
Dept: PULMONOLOGY | Age: 64
End: 2019-06-05
Payer: COMMERCIAL

## 2019-06-05 VITALS
OXYGEN SATURATION: 94 % | HEART RATE: 45 BPM | DIASTOLIC BLOOD PRESSURE: 76 MMHG | WEIGHT: 87 LBS | SYSTOLIC BLOOD PRESSURE: 115 MMHG | BODY MASS INDEX: 14.93 KG/M2

## 2019-06-05 DIAGNOSIS — R91.1 PULMONARY NODULE: ICD-10-CM

## 2019-06-05 DIAGNOSIS — J47.9 BRONCHIECTASIS WITHOUT COMPLICATION (HCC): ICD-10-CM

## 2019-06-05 DIAGNOSIS — J43.2 CENTRILOBULAR EMPHYSEMA (HCC): ICD-10-CM

## 2019-06-05 DIAGNOSIS — J44.9 COPD, MILD (HCC): Primary | ICD-10-CM

## 2019-06-05 PROCEDURE — G8419 CALC BMI OUT NRM PARAM NOF/U: HCPCS | Performed by: INTERNAL MEDICINE

## 2019-06-05 PROCEDURE — 99214 OFFICE O/P EST MOD 30 MIN: CPT | Performed by: INTERNAL MEDICINE

## 2019-06-05 PROCEDURE — 4040F PNEUMOC VAC/ADMIN/RCVD: CPT | Performed by: INTERNAL MEDICINE

## 2019-06-05 PROCEDURE — G8926 SPIRO NO PERF OR DOC: HCPCS | Performed by: INTERNAL MEDICINE

## 2019-06-05 PROCEDURE — 3023F SPIROM DOC REV: CPT | Performed by: INTERNAL MEDICINE

## 2019-06-05 PROCEDURE — 3017F COLORECTAL CA SCREEN DOC REV: CPT | Performed by: INTERNAL MEDICINE

## 2019-06-05 PROCEDURE — G8427 DOCREV CUR MEDS BY ELIG CLIN: HCPCS | Performed by: INTERNAL MEDICINE

## 2019-06-05 PROCEDURE — 1036F TOBACCO NON-USER: CPT | Performed by: INTERNAL MEDICINE

## 2019-06-05 PROCEDURE — 1123F ACP DISCUSS/DSCN MKR DOCD: CPT | Performed by: INTERNAL MEDICINE

## 2019-06-05 RX ORDER — DOXYCYCLINE HYCLATE 100 MG/1
100 CAPSULE ORAL DAILY
Qty: 10 CAPSULE | Refills: 3 | Status: SHIPPED | OUTPATIENT
Start: 2019-06-05 | End: 2019-06-15

## 2019-06-05 RX ORDER — ALBUTEROL SULFATE 90 UG/1
AEROSOL, METERED RESPIRATORY (INHALATION)
Qty: 1 INHALER | Refills: 6 | Status: SHIPPED | OUTPATIENT
Start: 2019-06-05 | End: 2020-06-09

## 2019-06-05 NOTE — PROGRESS NOTES
[facility]  Follow Up Note  Ana Blevins MD       Torsten Noonan   YOB: 1955    Date of Visit:  6/5/2019    Assessment/Plan:  1. COPD, mild ([address]  PFT 10/17:  INTERPRETATION:  Spirometry attempts were acceptable and reproducible. FVC was normal at 3.28 L, 88% predicted and decreased FEV1 of 1.99 L,  71% predicted. FEV1/FVC ratio was decreased at 61%. No response to  bronchodilators demonstrated on spirometry. Lung volumes showed  normal total lung capacity of 108% predicted. Diffusion capacity  showed decreased DLCO of 47% predicted.     IMPRESSION:  Mild obstructive defect on spirometry with no response to  bronchodilators. Normal lung volumes and moderate decrease in  diffusion capacity. Spiriva  Albuterol    2. Centrilobular emphysema ([address]  Noted on CT imaging    3. Bronchiectasis without complication ([address]  Also noted on CT imaging    4. Pulmonary nodule  Previous pulmonary nodule is unchanged in size. This nodule has been biopsied and was negative for malignancy. It showed only necrotizing tissue. There is a new 6 mm x 14 mm pulmonary nodule inferior to the right bronchus intermedius. This could possibly be inflammatory  Discussed options including biopsy versus serial scanning  We'll put him on doxycycline 100 mg ×30 days  Repeat CT imaging in September and have him follow up. After that  - CT Chest W Contrast; Future  - BUN; Future  - CREATININE, SERUM; Future      Chief Complaint   Patient presents with    Shortness of Breath     had a repeat CT Chest.        HPI  The patient presents with a chief complaint of abnormal CT scan of the chest.  He has a history of pulmonary aspergillosis for which she is on antifungal medication. He also had a broncholith removed at . He has been following a right upper lobe pulmonary nodule CT imaging. Recent scanning showed the nodule about the same size.   There was a biopsy done on this nodule that was just necrotic tissue. Current CT scan, however, does also show a new area of nodule/infiltrate not previously seen. The patient actually is asymptomatic denying cough, sputum, hemoptysis or shortness of breath. Review of Systems  As reviewed in HPI    History  I have reviewed past medical, surgical, social and family history. This is documented elsewhere in the medical record. Physical Exam:  Well developed, well nourished  Alert and oriented  Sclera is clear  No cervical adenopathy  No JVD. Chest examination is clear. Cardiac examination reveals regular rate and rhythm without murmur, gallop or rub. The abdomen is soft, nontender and nondistended. There is no clubbing, cyanosis or edema of the extremities. There is no obvious skin rash. No focal neuro deficicts  Normal mood and affect    Allergies   Allergen Reactions    Pcn [Penicillins] Hives     Unknown reaction     Prior to Visit Medications    Medication Sig Taking? Authorizing Provider   albuterol sulfate HFA (PROAIR HFA) 108 (90 Base) MCG/ACT inhaler USE 2 PUFFS EVERY 6 HOURS  AS NEEDED FOR WHEEZING Yes Mina Ramirez MD   doxycycline hyclate (VIBRAMYCIN) 100 MG capsule Take 1 capsule by mouth daily for 10 days Yes Mina Ramirez MD   benztropine (COGENTIN) 1 MG tablet Take 1 mg by mouth  Historical Provider, MD   posaconazole (NOXAFIL) 100 MG TBEC tablet Take 2 tablets by mouth daily (with breakfast)  Radha Webb MD   SPIRIVA RESPIMAT 2.5 MCG/ACT AERS inhaler USE 2 PUFFS DAILY  Yvette Ramirez MD   ADVAIR -21 MCG/ACT inhaler USE 2 PUFFS TWO Orquidea Baig MD   omeprazole (PRILOSEC) 20 MG delayed release capsule TAKE 1 CAPSULE BY MOUTH  DAILY  Edward Chau MD   paliperidone (INVEGA) 6 MG ER tablet Take 6 mg by mouth every morning. Historical Provider, MD       Vitals:    06/05/19 1529   BP: 115/76   Pulse: (!) 45   SpO2: 94%   Weight: 87 lb (39.5 kg)     Body mass index is 14.93 kg/m². Wt Readings from Last 3 Encounters:   19 87 lb (39.5 kg)   19 89 lb 3.2 oz (40.5 kg)   19 89 lb 9.6 oz (40.6 kg)     BP Readings from Last 3 Encounters:   19 115/76   19 102/70   19 101/65        Social History     Tobacco Use   Smoking Status Former Smoker    Packs/day: 2.00    Years: 35.00    Pack years: 70.00    Types: Cigarettes    Last attempt to quit: 2013    Years since quittin.4   Smokeless Tobacco Never Used

## 2019-07-08 ENCOUNTER — TELEPHONE (OUTPATIENT)
Dept: INTERNAL MEDICINE CLINIC | Age: 64
End: 2019-07-08

## 2019-08-13 ENCOUNTER — OFFICE VISIT (OUTPATIENT)
Dept: INFECTIOUS DISEASES | Age: 64
End: 2019-08-13
Payer: COMMERCIAL

## 2019-08-13 ENCOUNTER — TELEPHONE (OUTPATIENT)
Dept: INFECTIOUS DISEASES | Age: 64
End: 2019-08-13

## 2019-08-13 VITALS
HEIGHT: 64 IN | HEART RATE: 93 BPM | WEIGHT: 89.6 LBS | OXYGEN SATURATION: 99 % | BODY MASS INDEX: 15.3 KG/M2 | SYSTOLIC BLOOD PRESSURE: 102 MMHG | DIASTOLIC BLOOD PRESSURE: 62 MMHG | RESPIRATION RATE: 20 BRPM

## 2019-08-13 DIAGNOSIS — J44.9 CHRONIC OBSTRUCTIVE PULMONARY DISEASE, UNSPECIFIED COPD TYPE (HCC): ICD-10-CM

## 2019-08-13 DIAGNOSIS — Z71.6 TOBACCO ABUSE COUNSELING: ICD-10-CM

## 2019-08-13 DIAGNOSIS — J84.10 GRANULOMATOUS LUNG DISEASE (HCC): ICD-10-CM

## 2019-08-13 DIAGNOSIS — F17.200 SMOKER: ICD-10-CM

## 2019-08-13 DIAGNOSIS — R63.4 WEIGHT LOSS: ICD-10-CM

## 2019-08-13 DIAGNOSIS — R91.8 MULTIPLE LUNG NODULES ON CT: ICD-10-CM

## 2019-08-13 DIAGNOSIS — K21.9 GASTROESOPHAGEAL REFLUX DISEASE WITHOUT ESOPHAGITIS: ICD-10-CM

## 2019-08-13 DIAGNOSIS — B44.1 PULMONARY ASPERGILLOSIS (HCC): Primary | ICD-10-CM

## 2019-08-13 DIAGNOSIS — F31.9 BIPOLAR AFFECTIVE DISORDER, REMISSION STATUS UNSPECIFIED (HCC): ICD-10-CM

## 2019-08-13 DIAGNOSIS — Z51.81 THERAPEUTIC DRUG MONITORING: ICD-10-CM

## 2019-08-13 DIAGNOSIS — Z71.89 ENCOUNTER FOR MEDICATION COUNSELING: ICD-10-CM

## 2019-08-13 PROCEDURE — 1036F TOBACCO NON-USER: CPT | Performed by: INTERNAL MEDICINE

## 2019-08-13 PROCEDURE — G8926 SPIRO NO PERF OR DOC: HCPCS | Performed by: INTERNAL MEDICINE

## 2019-08-13 PROCEDURE — 3017F COLORECTAL CA SCREEN DOC REV: CPT | Performed by: INTERNAL MEDICINE

## 2019-08-13 PROCEDURE — 3023F SPIROM DOC REV: CPT | Performed by: INTERNAL MEDICINE

## 2019-08-13 PROCEDURE — G8427 DOCREV CUR MEDS BY ELIG CLIN: HCPCS | Performed by: INTERNAL MEDICINE

## 2019-08-13 PROCEDURE — G8419 CALC BMI OUT NRM PARAM NOF/U: HCPCS | Performed by: INTERNAL MEDICINE

## 2019-08-13 PROCEDURE — 1123F ACP DISCUSS/DSCN MKR DOCD: CPT | Performed by: INTERNAL MEDICINE

## 2019-08-13 PROCEDURE — 4040F PNEUMOC VAC/ADMIN/RCVD: CPT | Performed by: INTERNAL MEDICINE

## 2019-08-13 PROCEDURE — 99214 OFFICE O/P EST MOD 30 MIN: CPT | Performed by: INTERNAL MEDICINE

## 2019-08-13 ASSESSMENT — ENCOUNTER SYMPTOMS
WHEEZING: 0
SHORTNESS OF BREATH: 0
BACK PAIN: 0
DIARRHEA: 0
NAUSEA: 0
TROUBLE SWALLOWING: 0
EYE DISCHARGE: 0
SORE THROAT: 0
COUGH: 0
RHINORRHEA: 0
EYE REDNESS: 0
ABDOMINAL PAIN: 0
CONSTIPATION: 0

## 2019-08-13 NOTE — PROGRESS NOTES
VASECTOMY         Current Medications: All medications were reviewed by me during this visit  Current Outpatient Medications   Medication Sig Dispense Refill    albuterol sulfate HFA (PROAIR HFA) 108 (90 Base) MCG/ACT inhaler USE 2 PUFFS EVERY 6 HOURS  AS NEEDED FOR WHEEZING 1 Inhaler 6    benztropine (COGENTIN) 1 MG tablet Take 1 mg by mouth      posaconazole (NOXAFIL) 100 MG TBEC tablet Take 2 tablets by mouth daily (with breakfast) 180 tablet 2    SPIRIVA RESPIMAT 2.5 MCG/ACT AERS inhaler USE 2 PUFFS DAILY 3 g 1    ADVAIR -21 MCG/ACT inhaler USE 2 PUFFS TWO TIMES DAILY 3 Inhaler 1    omeprazole (PRILOSEC) 20 MG delayed release capsule TAKE 1 CAPSULE BY MOUTH  DAILY 90 capsule 2    paliperidone (INVEGA) 6 MG ER tablet Take 6 mg by mouth every morning. No current facility-administered medications for this visit. Family history: All family history was reviewed by me today    Family History   Problem Relation Age of Onset    Arthritis Mother     Glaucoma Mother     High Blood Pressure Mother     Heart Disease Father         age [de-identified] COPD Father         emphysema    COPD Brother        No family history of immunocompromising or autoimmune conditions. No h/o TB in in family or contacts    SocialHistory:  All social and epidemiologic history was reviewed and updated be me in detail today.     Social History     Socioeconomic History    Marital status:      Spouse name: None    Number of children: 1    Years of education: None    Highest education level: None   Occupational History    None   Social Needs    Financial resource strain: None    Food insecurity:     Worry: None     Inability: None    Transportation needs:     Medical: None     Non-medical: None   Tobacco Use    Smoking status: Former Smoker     Packs/day: 2.00     Years: 35.00     Pack years: 70.00     Types: Cigarettes     Last attempt to quit: 2013     Years since quittin.6    Smokeless atraumatic. Mouth/Throat: Oropharynx is clear and moist. No oropharyngeal exudate. Eyes: Pupils are equal, round, and reactive to light. Conjunctivae and EOM are normal. Right eye exhibits no discharge. Left eye exhibits no discharge. No scleral icterus. Neck: Normal range of motion. Neck supple. Cardiovascular: Normal rate and regular rhythm. Exam reveals no friction rub. No murmur heard. Pulmonary/Chest: No stridor. No respiratory distress. He has no wheezes. He has no rales. Abdominal: Soft. Bowel sounds are normal. There is no tenderness. There is no rebound and no guarding. Musculoskeletal: Normal range of motion. He exhibits no edema or tenderness. Lymphadenopathy:     He has no cervical adenopathy. He has no axillary adenopathy. Neurological: He is alert and oriented to person, place, and time. He exhibits normal muscle tone. Skin: Skin is warm and dry. No rash noted. He is not diaphoretic. No erythema. Psychiatric: He has a normal mood and affect. His behavior is normal.   Nursing note and vitals reviewed.            DATA:  All available lab data wasreviewed by me during this visit    CBC:  Lab Results   Component Value Date    WBC 9.6 12/18/2018    HGB 17.9 (H) 12/18/2018    HCT 53.6 (H) 12/18/2018    MCV 93.0 12/18/2018     12/18/2018    LYMPHOPCT 15.0 12/18/2018    RBC 5.76 12/18/2018    MCH 31.1 12/18/2018    MCHC 33.4 12/18/2018    RDW 14.2 12/18/2018       Last BMP:  Lab Results   Component Value Date     12/18/2018    K 4.4 12/18/2018    K 3.7 01/23/2018     12/18/2018    CO2 28 12/18/2018    BUN 21 12/18/2018    CREATININE 0.8 12/18/2018    GLUCOSE 114 12/18/2018    CALCIUM 9.7 12/18/2018        Hepatic Function Panel:  Lab Results   Component Value Date    ALKPHOS 81 10/30/2018    ALT 47 10/30/2018    AST 30 10/30/2018    PROT 6.7 10/30/2018    PROT 6.6 08/27/2012    BILITOT 0.3 10/30/2018    BILIDIR 0.11 08/27/2012    IBILI 0.2 08/27/2012    LABALBU 4.3

## 2019-08-14 NOTE — TELEPHONE ENCOUNTER
Spoke with pt (wife is not home, he states she is at work).   The patient states that his blood was drawn yesterday at 6pm, approx 5 hrs after he took his medication at 1pm.  Results should be in soon

## 2019-08-15 ENCOUNTER — TELEPHONE (OUTPATIENT)
Dept: INFECTIOUS DISEASES | Age: 64
End: 2019-08-15

## 2019-08-15 DIAGNOSIS — E87.5 HIGH SERUM POTASSIUM LEVEL: Primary | ICD-10-CM

## 2019-08-15 LAB
ALBUMIN SERPL-MCNC: 4.5 G/DL
ALP BLD-CCNC: 73 U/L
ALT SERPL-CCNC: 27 U/L
ANION GAP SERPL CALCULATED.3IONS-SCNC: NORMAL MMOL/L
AST SERPL-CCNC: 31 U/L
BASOPHILS ABSOLUTE: 0 /ΜL
BASOPHILS RELATIVE PERCENT: 0 %
BILIRUB SERPL-MCNC: 0.5 MG/DL (ref 0.1–1.4)
BUN BLDV-MCNC: 13 MG/DL
CALCIUM SERPL-MCNC: 9.7 MG/DL
CHLORIDE BLD-SCNC: 102 MMOL/L
CO2: 23 MMOL/L
CREAT SERPL-MCNC: 0.74 MG/DL
EOSINOPHILS ABSOLUTE: 0.1 /ΜL
EOSINOPHILS RELATIVE PERCENT: 0 %
GFR CALCULATED: NORMAL
GLUCOSE BLD-MCNC: 84 MG/DL
HCT VFR BLD CALC: 48.1 % (ref 41–53)
HEMOGLOBIN: 17.5 G/DL (ref 13.5–17.5)
LYMPHOCYTES ABSOLUTE: 1.6 /ΜL
LYMPHOCYTES RELATIVE PERCENT: 14 %
MCH RBC QN AUTO: 31.8 PG
MCHC RBC AUTO-ENTMCNC: 36.4 G/DL
MCV RBC AUTO: 88 FL
MONOCYTES ABSOLUTE: 1.1 /ΜL
MONOCYTES RELATIVE PERCENT: 9 %
NEUTROPHILS ABSOLUTE: 9.1 /ΜL
NEUTROPHILS RELATIVE PERCENT: 75 %
PDW BLD-RTO: 14.1 %
PLATELET # BLD: 249 K/ΜL
PMV BLD AUTO: NORMAL FL
POTASSIUM SERPL-SCNC: 10 MMOL/L
RBC # BLD: 5.5 10^6/ΜL
SODIUM BLD-SCNC: 135 MMOL/L
TOTAL PROTEIN: 6.4
WBC # BLD: 12.1 10^3/ML

## 2019-08-16 ENCOUNTER — OFFICE VISIT (OUTPATIENT)
Dept: INTERNAL MEDICINE CLINIC | Age: 64
End: 2019-08-16
Payer: COMMERCIAL

## 2019-08-16 VITALS
SYSTOLIC BLOOD PRESSURE: 112 MMHG | OXYGEN SATURATION: 95 % | BODY MASS INDEX: 15.11 KG/M2 | WEIGHT: 88 LBS | HEART RATE: 99 BPM | DIASTOLIC BLOOD PRESSURE: 60 MMHG

## 2019-08-16 DIAGNOSIS — H90.0 CONDUCTIVE HEARING LOSS, BILATERAL: ICD-10-CM

## 2019-08-16 DIAGNOSIS — H61.23 BILATERAL IMPACTED CERUMEN: Primary | ICD-10-CM

## 2019-08-16 PROCEDURE — G8427 DOCREV CUR MEDS BY ELIG CLIN: HCPCS | Performed by: INTERNAL MEDICINE

## 2019-08-16 PROCEDURE — 3017F COLORECTAL CA SCREEN DOC REV: CPT | Performed by: INTERNAL MEDICINE

## 2019-08-16 PROCEDURE — G8419 CALC BMI OUT NRM PARAM NOF/U: HCPCS | Performed by: INTERNAL MEDICINE

## 2019-08-16 PROCEDURE — 99213 OFFICE O/P EST LOW 20 MIN: CPT | Performed by: INTERNAL MEDICINE

## 2019-08-16 PROCEDURE — 4040F PNEUMOC VAC/ADMIN/RCVD: CPT | Performed by: INTERNAL MEDICINE

## 2019-08-16 PROCEDURE — 1123F ACP DISCUSS/DSCN MKR DOCD: CPT | Performed by: INTERNAL MEDICINE

## 2019-08-16 PROCEDURE — 69210 REMOVE IMPACTED EAR WAX UNI: CPT | Performed by: INTERNAL MEDICINE

## 2019-08-16 PROCEDURE — 1036F TOBACCO NON-USER: CPT | Performed by: INTERNAL MEDICINE

## 2019-08-16 ASSESSMENT — ENCOUNTER SYMPTOMS
SORE THROAT: 0
RHINORRHEA: 0
COUGH: 0

## 2019-08-16 NOTE — PROGRESS NOTES
BP: 112/60   Site: Right Upper Arm   Position: Sitting   Cuff Size: Child   Pulse: 99   SpO2: 95%   Weight: 88 lb (39.9 kg)     Estimated body mass index is 15.11 kg/m² as calculated from the following:    Height as of 8/13/19: 5' 4\" (1.626 m). Weight as of this encounter: 88 lb (39.9 kg). Physical Exam   Constitutional: He appears well-nourished. No distress. HENT:   Right Ear: There is drainage. Tympanic membrane is not erythematous and not retracted. Tympanic membrane mobility is normal.   Left Ear: There is drainage. Tympanic membrane is not erythematous and not retracted. Tympanic membrane mobility is normal.   Ears:    Eyes: Pupils are equal, round, and reactive to light. Conjunctivae are normal. Right eye exhibits no discharge. Left eye exhibits no discharge. Neck: Normal range of motion. No JVD present. No tracheal deviation present. No thyromegaly present. Cardiovascular: Normal rate, regular rhythm and normal heart sounds. Exam reveals no friction rub. No murmur heard. Pulmonary/Chest: Effort normal and breath sounds normal. No stridor. No respiratory distress. He has no wheezes. ASSESSMENT/PLAN:  1. Bilateral impacted cerumen  stable  - 217[address] - [place] REMOVE IMPACTED EAR WAX  -  Ceruminosis is noted. Wax is removed by syringing and manual debridement. Instructions for home care to prevent wax buildup are given. 2. Conductive hearing loss, bilateral  new  - [address], [place], [place]. [place] Audiology, [facility]      Return in about 1 month (around 9/16/2019) for hearing loss - [place]. An electronic signature was used to authenticate this note.     --Vicki Sutton MD on 8/16/2019 at 4:56 PM

## 2019-08-19 ENCOUNTER — TELEPHONE (OUTPATIENT)
Dept: INFECTIOUS DISEASES | Age: 64
End: 2019-08-19

## 2019-08-19 NOTE — TELEPHONE ENCOUNTER
Okay to release any lab results that patient requests. Has the repeat BMP been done yet as requested? Please see my previous telephone note for reference. Thanks.

## 2019-08-19 NOTE — TELEPHONE ENCOUNTER
Pt states he has not had his labs redrawn-when questioned about when he will be able to get it redrawn he stated he would have \"Mirna\" do it. I believe Johnnie Nova is his wife and the patient is very confused. No other number to reach his wife other than the home number and the patient states she is not there.     Holding message for future fu

## 2019-08-20 DIAGNOSIS — K21.9 GASTROESOPHAGEAL REFLUX DISEASE WITHOUT ESOPHAGITIS: ICD-10-CM

## 2019-08-21 RX ORDER — OMEPRAZOLE 20 MG/1
20 CAPSULE, DELAYED RELEASE ORAL DAILY
Qty: 90 CAPSULE | Refills: 2 | Status: SHIPPED | OUTPATIENT
Start: 2019-08-21 | End: 2020-07-17

## 2019-08-27 ENCOUNTER — HOSPITAL ENCOUNTER (OUTPATIENT)
Age: 64
Discharge: HOME | End: 2019-08-27
Payer: COMMERCIAL

## 2019-08-27 DIAGNOSIS — R91.1 PULMONARY NODULE: ICD-10-CM

## 2019-08-27 LAB
BUN BLDV-MCNC: 11 MG/DL (ref 7–20)
CREAT SERPL-MCNC: 0.7 MG/DL (ref 0.8–1.3)
GFR AFRICAN AMERICAN: >60
GFR NON-AFRICAN AMERICAN: >60

## 2019-08-27 PROCEDURE — 84520 ASSAY OF UREA NITROGEN: CPT

## 2019-08-27 PROCEDURE — 82565 ASSAY OF CREATININE: CPT

## 2019-08-27 PROCEDURE — 36415 COLL VENOUS BLD VENIPUNCTURE: CPT

## 2019-09-03 ENCOUNTER — HOSPITAL ENCOUNTER (OUTPATIENT)
Dept: CT IMAGING | Age: 64
Discharge: HOME | End: 2019-09-03
Payer: COMMERCIAL

## 2019-09-03 DIAGNOSIS — R91.1 PULMONARY NODULE: ICD-10-CM

## 2019-09-03 PROCEDURE — 71260 CT THORAX DX C+: CPT

## 2019-09-03 PROCEDURE — 6360000004 HC RX CONTRAST MEDICATION: Performed by: INTERNAL MEDICINE

## 2019-09-03 RX ADMIN — IOPAMIDOL 65 ML: 755 INJECTION, SOLUTION INTRAVENOUS at 08:07

## 2019-09-09 ENCOUNTER — TELEPHONE (OUTPATIENT)
Dept: PULMONOLOGY | Age: 64
End: 2019-09-09

## 2019-09-09 DIAGNOSIS — R91.1 PULMONARY NODULE: Primary | ICD-10-CM

## 2019-09-09 NOTE — TELEPHONE ENCOUNTER
I lmz on vm to inform the pt of his results and to have him call if he has any questions - CT Chest order in epic

## 2019-09-10 ENCOUNTER — OFFICE VISIT (OUTPATIENT)
Dept: PULMONOLOGY | Age: 64
End: 2019-09-10
Payer: COMMERCIAL

## 2019-09-10 VITALS
WEIGHT: 89 LBS | HEART RATE: 84 BPM | OXYGEN SATURATION: 97 % | DIASTOLIC BLOOD PRESSURE: 62 MMHG | SYSTOLIC BLOOD PRESSURE: 100 MMHG | BODY MASS INDEX: 15.28 KG/M2

## 2019-09-10 DIAGNOSIS — J43.2 CENTRILOBULAR EMPHYSEMA (HCC): ICD-10-CM

## 2019-09-10 DIAGNOSIS — J44.9 COPD, MILD (HCC): Primary | ICD-10-CM

## 2019-09-10 DIAGNOSIS — J47.9 BRONCHIECTASIS WITHOUT COMPLICATION (HCC): ICD-10-CM

## 2019-09-10 DIAGNOSIS — R91.1 PULMONARY NODULE: ICD-10-CM

## 2019-09-10 PROCEDURE — 4040F PNEUMOC VAC/ADMIN/RCVD: CPT | Performed by: INTERNAL MEDICINE

## 2019-09-10 PROCEDURE — 1036F TOBACCO NON-USER: CPT | Performed by: INTERNAL MEDICINE

## 2019-09-10 PROCEDURE — G8427 DOCREV CUR MEDS BY ELIG CLIN: HCPCS | Performed by: INTERNAL MEDICINE

## 2019-09-10 PROCEDURE — G8926 SPIRO NO PERF OR DOC: HCPCS | Performed by: INTERNAL MEDICINE

## 2019-09-10 PROCEDURE — G8419 CALC BMI OUT NRM PARAM NOF/U: HCPCS | Performed by: INTERNAL MEDICINE

## 2019-09-10 PROCEDURE — 3023F SPIROM DOC REV: CPT | Performed by: INTERNAL MEDICINE

## 2019-09-10 PROCEDURE — 3017F COLORECTAL CA SCREEN DOC REV: CPT | Performed by: INTERNAL MEDICINE

## 2019-09-10 PROCEDURE — 1123F ACP DISCUSS/DSCN MKR DOCD: CPT | Performed by: INTERNAL MEDICINE

## 2019-09-10 PROCEDURE — 99214 OFFICE O/P EST MOD 30 MIN: CPT | Performed by: INTERNAL MEDICINE

## 2019-09-26 ENCOUNTER — HOSPITAL ENCOUNTER (OUTPATIENT)
Age: 64
Discharge: HOME | End: 2019-09-26
Payer: COMMERCIAL

## 2019-09-26 ENCOUNTER — OFFICE VISIT (OUTPATIENT)
Dept: INFECTIOUS DISEASES | Age: 64
End: 2019-09-26
Payer: COMMERCIAL

## 2019-09-26 VITALS
HEIGHT: 64 IN | HEART RATE: 102 BPM | BODY MASS INDEX: 15.5 KG/M2 | DIASTOLIC BLOOD PRESSURE: 88 MMHG | RESPIRATION RATE: 20 BRPM | OXYGEN SATURATION: 96 % | WEIGHT: 90.8 LBS | SYSTOLIC BLOOD PRESSURE: 126 MMHG

## 2019-09-26 DIAGNOSIS — K21.9 GASTROESOPHAGEAL REFLUX DISEASE WITHOUT ESOPHAGITIS: ICD-10-CM

## 2019-09-26 DIAGNOSIS — J43.2 CENTRILOBULAR EMPHYSEMA (HCC): ICD-10-CM

## 2019-09-26 DIAGNOSIS — R91.8 MULTIPLE LUNG NODULES ON CT: ICD-10-CM

## 2019-09-26 DIAGNOSIS — R63.4 WEIGHT LOSS: ICD-10-CM

## 2019-09-26 DIAGNOSIS — F31.9 BIPOLAR AFFECTIVE DISORDER, REMISSION STATUS UNSPECIFIED (HCC): ICD-10-CM

## 2019-09-26 DIAGNOSIS — J44.9 CHRONIC OBSTRUCTIVE PULMONARY DISEASE, UNSPECIFIED COPD TYPE (HCC): ICD-10-CM

## 2019-09-26 DIAGNOSIS — J84.10 GRANULOMATOUS LUNG DISEASE (HCC): ICD-10-CM

## 2019-09-26 DIAGNOSIS — F17.200 SMOKER: ICD-10-CM

## 2019-09-26 DIAGNOSIS — B44.1 PULMONARY ASPERGILLOSIS (HCC): ICD-10-CM

## 2019-09-26 DIAGNOSIS — E44.0 PROTEIN-CALORIE MALNUTRITION, MODERATE (HCC): ICD-10-CM

## 2019-09-26 DIAGNOSIS — Z51.81 THERAPEUTIC DRUG MONITORING: ICD-10-CM

## 2019-09-26 DIAGNOSIS — J47.9 BRONCHIECTASIS WITHOUT COMPLICATION (HCC): ICD-10-CM

## 2019-09-26 DIAGNOSIS — B44.1 PULMONARY ASPERGILLOSIS (HCC): Primary | ICD-10-CM

## 2019-09-26 LAB
A/G RATIO: 1.8 (ref 1.1–2.2)
ALBUMIN SERPL-MCNC: 4.8 G/DL (ref 3.4–5)
ALP BLD-CCNC: 90 U/L (ref 40–129)
ALT SERPL-CCNC: 38 U/L (ref 10–40)
ANION GAP SERPL CALCULATED.3IONS-SCNC: 16 MMOL/L (ref 3–16)
AST SERPL-CCNC: 29 U/L (ref 15–37)
BANDED NEUTROPHILS RELATIVE PERCENT: 1 % (ref 0–7)
BASOPHILS ABSOLUTE: 0 K/UL (ref 0–0.2)
BASOPHILS RELATIVE PERCENT: 0 %
BILIRUB SERPL-MCNC: 0.5 MG/DL (ref 0–1)
BUN BLDV-MCNC: 15 MG/DL (ref 7–20)
CALCIUM SERPL-MCNC: 10 MG/DL (ref 8.3–10.6)
CHLORIDE BLD-SCNC: 99 MMOL/L (ref 99–110)
CO2: 25 MMOL/L (ref 21–32)
CREAT SERPL-MCNC: 0.7 MG/DL (ref 0.8–1.3)
EOSINOPHILS ABSOLUTE: 0 K/UL (ref 0–0.6)
EOSINOPHILS RELATIVE PERCENT: 0 %
GFR AFRICAN AMERICAN: >60
GFR NON-AFRICAN AMERICAN: >60
GLOBULIN: 2.6 G/DL
GLUCOSE BLD-MCNC: 97 MG/DL (ref 70–99)
HCT VFR BLD CALC: 51.3 % (ref 40.5–52.5)
HEMOGLOBIN: 17.6 G/DL (ref 13.5–17.5)
IGA: 139 MG/DL (ref 70–400)
IGG: 633 MG/DL (ref 700–1600)
IGM: 16 MG/DL (ref 40–230)
LYMPHOCYTES ABSOLUTE: 2.3 K/UL (ref 1–5.1)
LYMPHOCYTES RELATIVE PERCENT: 19 %
MCH RBC QN AUTO: 31.6 PG (ref 26–34)
MCHC RBC AUTO-ENTMCNC: 34.3 G/DL (ref 31–36)
MCV RBC AUTO: 92.2 FL (ref 80–100)
METAMYELOCYTES RELATIVE PERCENT: 3 %
MONOCYTES ABSOLUTE: 0.6 K/UL (ref 0–1.3)
MONOCYTES RELATIVE PERCENT: 5 %
NEUTROPHILS ABSOLUTE: 9.1 K/UL (ref 1.7–7.7)
NEUTROPHILS RELATIVE PERCENT: 72 %
PDW BLD-RTO: 14.6 % (ref 12.4–15.4)
PLATELET # BLD: 231 K/UL (ref 135–450)
PMV BLD AUTO: 7.2 FL (ref 5–10.5)
POTASSIUM SERPL-SCNC: 4 MMOL/L (ref 3.5–5.1)
RBC # BLD: 5.57 M/UL (ref 4.2–5.9)
RBC # BLD: NORMAL 10*6/UL
SODIUM BLD-SCNC: 140 MMOL/L (ref 136–145)
TOTAL PROTEIN: 7.4 G/DL (ref 6.4–8.2)
WBC # BLD: 12 K/UL (ref 4–11)

## 2019-09-26 PROCEDURE — 87390 HIV-1 AG IA: CPT

## 2019-09-26 PROCEDURE — 87305 ASPERGILLUS AG IA: CPT

## 2019-09-26 PROCEDURE — 1123F ACP DISCUSS/DSCN MKR DOCD: CPT | Performed by: INTERNAL MEDICINE

## 2019-09-26 PROCEDURE — 82784 ASSAY IGA/IGD/IGG/IGM EACH: CPT

## 2019-09-26 PROCEDURE — 87449 NOS EACH ORGANISM AG IA: CPT

## 2019-09-26 PROCEDURE — 87385 HISTOPLASMA CAPSUL AG IA: CPT

## 2019-09-26 PROCEDURE — G8419 CALC BMI OUT NRM PARAM NOF/U: HCPCS | Performed by: INTERNAL MEDICINE

## 2019-09-26 PROCEDURE — 3023F SPIROM DOC REV: CPT | Performed by: INTERNAL MEDICINE

## 2019-09-26 PROCEDURE — 86702 HIV-2 ANTIBODY: CPT

## 2019-09-26 PROCEDURE — 80053 COMPREHEN METABOLIC PANEL: CPT

## 2019-09-26 PROCEDURE — 36415 COLL VENOUS BLD VENIPUNCTURE: CPT

## 2019-09-26 PROCEDURE — 4040F PNEUMOC VAC/ADMIN/RCVD: CPT | Performed by: INTERNAL MEDICINE

## 2019-09-26 PROCEDURE — 86701 HIV-1ANTIBODY: CPT

## 2019-09-26 PROCEDURE — 3017F COLORECTAL CA SCREEN DOC REV: CPT | Performed by: INTERNAL MEDICINE

## 2019-09-26 PROCEDURE — 85025 COMPLETE CBC W/AUTO DIFF WBC: CPT

## 2019-09-26 PROCEDURE — 99214 OFFICE O/P EST MOD 30 MIN: CPT | Performed by: INTERNAL MEDICINE

## 2019-09-26 PROCEDURE — G8926 SPIRO NO PERF OR DOC: HCPCS | Performed by: INTERNAL MEDICINE

## 2019-09-26 PROCEDURE — 80299 QUANTITATIVE ASSAY DRUG: CPT

## 2019-09-26 PROCEDURE — 1036F TOBACCO NON-USER: CPT | Performed by: INTERNAL MEDICINE

## 2019-09-26 PROCEDURE — 86360 T CELL ABSOLUTE COUNT/RATIO: CPT

## 2019-09-26 PROCEDURE — G8427 DOCREV CUR MEDS BY ELIG CLIN: HCPCS | Performed by: INTERNAL MEDICINE

## 2019-09-26 ASSESSMENT — ENCOUNTER SYMPTOMS
EYE REDNESS: 0
RHINORRHEA: 0
EYE DISCHARGE: 0
CONSTIPATION: 0
COUGH: 0
DIARRHEA: 0
TROUBLE SWALLOWING: 0
SHORTNESS OF BREATH: 0
NAUSEA: 0
SORE THROAT: 0
BACK PAIN: 0
WHEEZING: 0
ABDOMINAL PAIN: 0

## 2019-09-26 NOTE — PROGRESS NOTES
Infectious Diseases Oupatient Follow-up Note      Reason for Visit:               Pulmonary aspergillosis  Primary Care Physician:  Maida Crenshaw MD  History Obtained From:   Patient , Medical Records     CHIEF COMPLAINT:    Chief Complaint   Patient presents with    Follow-up     Pulmonary aspergillosis       INTERVAL HISTORY: Rashida Palomo is a 70 y.o. male patient,who was seen today in ID clinic for follow-up. History was obtained from chart review and the patient. The patient was seen today for above mentioned complaints. The patient has COPD and had a pedunculated in the right bronchus intermedius which was removed at [facility] transbronchially and cultures are positive for Aspergillus fumigatus. The patient was initially started on voriconazole in 2018 but developed photosensitivity and was switched to oral posaconazole on 9/25/2018. He has been on oral posaconazole 200 mg daily. The patient had a CT scan done on 6/3/2019 which showed solid spiculated right upper lobe lung nodule. He comes today for follow-up. Past Medical History:   Past medical and surgical history was reviewed by me during this visit in detail.     Past Medical History:   Diagnosis Date    Arthritis     Aspergillus ([address] 05/18/2018    resp culture    Bipolar 1 disorder (HCC)     Chronic bronchitis, obstructive (HCC)     COPD (chronic obstructive pulmonary disease) (HCC)     GERD (gastroesophageal reflux disease)     Tourette's        Past Surgical History:    Past Surgical History:   Procedure Laterality Date    BRONCHOSCOPY N/A 05/18/2018    ENDOSCOPY, COLON, DIAGNOSTIC      KNEE ARTHROSCOPY      bilateral    LUNG BIOPSY  12/2018    TONSILLECTOMY      UPPER GASTROINTESTINAL ENDOSCOPY  9/25/15    UPPER GASTROINTESTINAL ENDOSCOPY N/A 2/21/2019    EGD DILATION BALLOON performed by Kirk Jones MD at [place] [address] 2/21/2019    EGD BIOPSY performed by Kirk Jones MD at [facility]         Current Medications: All medications were reviewed by me during this visit  Current Outpatient Medications   Medication Sig Dispense Refill    tiotropium (SPIRIVA RESPIMAT) 2.5 MCG/ACT AERS inhaler USE 2 PUFFS DAILY 3 g 1    fluticasone-salmeterol (ADVAIR HFA) 230-21 MCG/ACT inhaler USE 2 PUFFS TWO TIMES DAILY 3 Inhaler 1    omeprazole (PRILOSEC) 20 MG delayed release capsule TAKE 1 CAPSULE BY MOUTH  DAILY 90 capsule 2    albuterol sulfate HFA (PROAIR HFA) 108 (90 Base) MCG/ACT inhaler USE 2 PUFFS EVERY 6 HOURS  AS NEEDED FOR WHEEZING 1 Inhaler 6    benztropine (COGENTIN) 1 MG tablet Take 1 mg by mouth      posaconazole (NOXAFIL) 100 MG TBEC tablet Take 2 tablets by mouth daily (with breakfast) 180 tablet 2    paliperidone (INVEGA) 6 MG ER tablet Take 6 mg by mouth every morning. No current facility-administered medications for this visit. Family history: All family history was reviewed by me today    Family History   Problem Relation Age of Onset    Arthritis Mother     Glaucoma Mother     High Blood Pressure Mother     Heart Disease Father         age [de-identified] COPD Father         emphysema    COPD Brother        No family history of immunocompromising or autoimmune conditions. No h/o TB in in family or contacts    SocialHistory:  All social and epidemiologic history was reviewed and updated be me in detail today.     Social History     Socioeconomic History    Marital status:      Spouse name: None    Number of children: 1    Years of education: None    Highest education level: None   Occupational History    None   Social Needs    Financial resource strain: None    Food insecurity:     Worry: None     Inability: None    Transportation needs:     Medical: None     Non-medical: None   Tobacco Use    Smoking status: Former Smoker     Packs/day: 2.00     Years: 35.00 Occurrences:   1     Standing Expiration Date:   9/26/2020    MISCELLANEOUS SENDOUT 1     Standing Status:   Future     Number of Occurrences:   1     Standing Expiration Date:   9/26/2020     Order Specific Question:   Specify Req. Test (1 Test/Order)     Answer:   Posaconazole level    HIV Screen     Standing Status:   Future     Number of Occurrences:   1     Standing Expiration Date:   9/26/2020    Flow Cytometry [place] CD4/CD8 Blood     Standing Status:   Future     Number of Occurrences:   1     Standing Expiration Date:   9/26/2020    IGG, IGA, IGM     Standing Status:   Future     Number of Occurrences:   1     Standing Expiration Date:   9/26/2020    ASPERGILLUS GALACTOMANNAN AG ASSAY     Standing Status:   Future     Number of Occurrences:   1     Standing Expiration Date:   9/26/2020    HISTOPLASMA ANTIGEN, SERUM     Standing Status:   Future     Number of Occurrences:   1     Standing Expiration Date:   9/26/2020    HISTOPLASMA ANTIGEN, URINE     Standing Status:   Future     Number of Occurrences:   1     Standing Expiration Date:   9/26/2020         Drug Monitoring:    · Monitor for antibiotic toxicity as follows: CBC, CMP, posaconazole level to be done a week before the next visit  · Fax above results to [phone]. Smoking cessation/ Tobacco use disorder counseling:    I have counseled the patient extensively about risks of smoking and have encouraged the patient to maintain a healthy smoke-free lifestyle. Information was given about various smoking cessation programs and their websites like www.smokefree.gov, [place]. quitlogix. org as well as help lines like 1800-QUIT-NOW and 1800W[place]achaLUNG-[place].       Patient education and counseling:    · The patient was educated in detail about the side-effects of various antibiotics and things to watch for like new rashes, lip swelling, severereaction, worsening diarrhea, break through fever etc.  · Patient was instructed to stop antibiotics and call our office if these problems were to occur, or go to nearest ER ifexperiencing severe symptoms. · Discussed patient's condition and what to expect. All of the patient's questions were addressed in a satisfactory manner and patient verbalizedunderstanding all instructions. Follow-up:    · Follow-up with me in ID clinic in 3 months      TIME SPENT TODAY:    - Spent over 26 minutes on visit (including interval history, physical exam, review of data including labs,cultures, imaging, development and implementation of treatment plan and coordination of care). - Over 50% of time spent with pt counseling andeducation. Please note that this chart was generated using Dragon dictation software. Although every effort was made to ensure the accuracy of this automated transcription, some errors in transcription may have occurred inadvertently. If you may need any clarification, please do not hesitate to contact me through EPIC or at the phone number provided below with my electronic signature. Any pictures or media included in this note were obtained after taking informed verbal consent from the patient and with their approval to include those in the patient's medical record. Thankyou for involving me inthe care of your patient. If you have any additional questions, please do not hesitate to contact me.     Sushant Alfredo MD, MPH  9/26/2019, 10:24 AM  [facility] Infectious Disease   Office: [phone]  Fax: [phone]  Tuesday AM clinic:   [address] [place] [address], [facility] [address]  Thursday AM clinic:Bill Ariza, [facility]

## 2019-09-27 LAB
HIV AG/AB: NORMAL
HIV ANTIGEN: NORMAL
HIV-1 ANTIBODY: NORMAL
HIV-2 AB: NORMAL
L. PNEUMOPHILA SEROGP 1 UR AG: NORMAL
STREP PNEUMONIAE ANTIGEN, URINE: NORMAL

## 2019-09-28 LAB
ASPERGILLUS GALACTO AG: NEGATIVE
ASPERGILLUS GALACTO INDEX: 0.03
HISTOPLASMA ANTIGEN URINE INTERP: NOT DETECTED
HISTOPLASMA ANTIGEN URINE: NOT DETECTED NG/ML

## 2019-09-30 LAB
HISTOPLASMA ANTIGEN, SERUM: NOT DETECTED
HISTOPLASMA INTERPETATION: NOT DETECTED

## 2019-10-03 LAB — MISCELLANEOUS LAB TEST ORDER: NORMAL

## 2019-10-10 ENCOUNTER — TELEPHONE (OUTPATIENT)
Dept: INFECTIOUS DISEASES | Age: 64
End: 2019-10-10

## 2019-10-21 ENCOUNTER — OFFICE VISIT (OUTPATIENT)
Dept: INTERNAL MEDICINE CLINIC | Age: 64
End: 2019-10-21
Payer: COMMERCIAL

## 2019-10-21 VITALS
DIASTOLIC BLOOD PRESSURE: 70 MMHG | OXYGEN SATURATION: 92 % | HEART RATE: 81 BPM | BODY MASS INDEX: 15.81 KG/M2 | HEIGHT: 64 IN | TEMPERATURE: 97.5 F | WEIGHT: 92.6 LBS | SYSTOLIC BLOOD PRESSURE: 114 MMHG

## 2019-10-21 DIAGNOSIS — Z23 NEED FOR PNEUMOCOCCAL VACCINATION: ICD-10-CM

## 2019-10-21 DIAGNOSIS — Z11.59 ENCOUNTER FOR HEPATITIS C SCREENING TEST FOR LOW RISK PATIENT: ICD-10-CM

## 2019-10-21 DIAGNOSIS — Z23 NEEDS FLU SHOT: ICD-10-CM

## 2019-10-21 DIAGNOSIS — Z13.6 SCREENING FOR AAA (ABDOMINAL AORTIC ANEURYSM): Primary | ICD-10-CM

## 2019-10-21 DIAGNOSIS — I49.9 IRREGULAR HEART BEATS: ICD-10-CM

## 2019-10-21 PROCEDURE — 90472 IMMUNIZATION ADMIN EACH ADD: CPT | Performed by: INTERNAL MEDICINE

## 2019-10-21 PROCEDURE — 90471 IMMUNIZATION ADMIN: CPT | Performed by: INTERNAL MEDICINE

## 2019-10-21 PROCEDURE — 1036F TOBACCO NON-USER: CPT | Performed by: INTERNAL MEDICINE

## 2019-10-21 PROCEDURE — G8427 DOCREV CUR MEDS BY ELIG CLIN: HCPCS | Performed by: INTERNAL MEDICINE

## 2019-10-21 PROCEDURE — 93000 ELECTROCARDIOGRAM COMPLETE: CPT | Performed by: INTERNAL MEDICINE

## 2019-10-21 PROCEDURE — 4040F PNEUMOC VAC/ADMIN/RCVD: CPT | Performed by: INTERNAL MEDICINE

## 2019-10-21 PROCEDURE — G8419 CALC BMI OUT NRM PARAM NOF/U: HCPCS | Performed by: INTERNAL MEDICINE

## 2019-10-21 PROCEDURE — G8482 FLU IMMUNIZE ORDER/ADMIN: HCPCS | Performed by: INTERNAL MEDICINE

## 2019-10-21 PROCEDURE — 1123F ACP DISCUSS/DSCN MKR DOCD: CPT | Performed by: INTERNAL MEDICINE

## 2019-10-21 PROCEDURE — 90732 PPSV23 VACC 2 YRS+ SUBQ/IM: CPT | Performed by: INTERNAL MEDICINE

## 2019-10-21 PROCEDURE — 90653 IIV ADJUVANT VACCINE IM: CPT | Performed by: INTERNAL MEDICINE

## 2019-10-21 PROCEDURE — 99397 PER PM REEVAL EST PAT 65+ YR: CPT | Performed by: INTERNAL MEDICINE

## 2019-10-21 PROCEDURE — 3017F COLORECTAL CA SCREEN DOC REV: CPT | Performed by: INTERNAL MEDICINE

## 2019-10-21 ASSESSMENT — ENCOUNTER SYMPTOMS
FACIAL SWELLING: 0
BLOOD IN STOOL: 0
GASTROINTESTINAL NEGATIVE: 1
ANAL BLEEDING: 0
SHORTNESS OF BREATH: 1
CONSTIPATION: 0
ABDOMINAL DISTENTION: 0
ABDOMINAL PAIN: 0
EYES NEGATIVE: 1
COUGH: 1
RHINORRHEA: 0
DIARRHEA: 0
ALLERGIC/IMMUNOLOGIC NEGATIVE: 1

## 2019-11-01 ENCOUNTER — OFFICE VISIT (OUTPATIENT)
Dept: PULMONOLOGY | Age: 64
End: 2019-11-01
Payer: COMMERCIAL

## 2019-11-01 VITALS
WEIGHT: 94 LBS | BODY MASS INDEX: 16.14 KG/M2 | OXYGEN SATURATION: 96 % | HEART RATE: 67 BPM | DIASTOLIC BLOOD PRESSURE: 72 MMHG | SYSTOLIC BLOOD PRESSURE: 101 MMHG

## 2019-11-01 DIAGNOSIS — B44.1 PULMONARY ASPERGILLOSIS (HCC): ICD-10-CM

## 2019-11-01 DIAGNOSIS — J44.9 COPD, MILD (HCC): Primary | ICD-10-CM

## 2019-11-01 DIAGNOSIS — J43.2 CENTRILOBULAR EMPHYSEMA (HCC): ICD-10-CM

## 2019-11-01 DIAGNOSIS — J47.9 BRONCHIECTASIS WITHOUT COMPLICATION (HCC): ICD-10-CM

## 2019-11-01 DIAGNOSIS — R91.1 PULMONARY NODULE: ICD-10-CM

## 2019-11-01 PROCEDURE — 4040F PNEUMOC VAC/ADMIN/RCVD: CPT | Performed by: INTERNAL MEDICINE

## 2019-11-01 PROCEDURE — 1123F ACP DISCUSS/DSCN MKR DOCD: CPT | Performed by: INTERNAL MEDICINE

## 2019-11-01 PROCEDURE — 3017F COLORECTAL CA SCREEN DOC REV: CPT | Performed by: INTERNAL MEDICINE

## 2019-11-01 PROCEDURE — G8926 SPIRO NO PERF OR DOC: HCPCS | Performed by: INTERNAL MEDICINE

## 2019-11-01 PROCEDURE — G8427 DOCREV CUR MEDS BY ELIG CLIN: HCPCS | Performed by: INTERNAL MEDICINE

## 2019-11-01 PROCEDURE — 3023F SPIROM DOC REV: CPT | Performed by: INTERNAL MEDICINE

## 2019-11-01 PROCEDURE — 99214 OFFICE O/P EST MOD 30 MIN: CPT | Performed by: INTERNAL MEDICINE

## 2019-11-01 PROCEDURE — 1036F TOBACCO NON-USER: CPT | Performed by: INTERNAL MEDICINE

## 2019-11-01 PROCEDURE — G8482 FLU IMMUNIZE ORDER/ADMIN: HCPCS | Performed by: INTERNAL MEDICINE

## 2019-11-01 PROCEDURE — G8419 CALC BMI OUT NRM PARAM NOF/U: HCPCS | Performed by: INTERNAL MEDICINE

## 2019-11-04 ENCOUNTER — ANESTHESIA EVENT (OUTPATIENT)
Dept: ENDOSCOPY | Age: 64
End: 2019-11-04
Payer: COMMERCIAL

## 2019-11-06 ENCOUNTER — HOSPITAL ENCOUNTER (OUTPATIENT)
Age: 64
Setting detail: OUTPATIENT SURGERY
Discharge: HOME | End: 2019-11-06
Attending: INTERNAL MEDICINE | Admitting: INTERNAL MEDICINE
Payer: COMMERCIAL

## 2019-11-06 ENCOUNTER — ANESTHESIA (OUTPATIENT)
Dept: ENDOSCOPY | Age: 64
End: 2019-11-06
Payer: COMMERCIAL

## 2019-11-06 VITALS
BODY MASS INDEX: 15.71 KG/M2 | HEIGHT: 64 IN | OXYGEN SATURATION: 98 % | RESPIRATION RATE: 22 BRPM | SYSTOLIC BLOOD PRESSURE: 130 MMHG | HEART RATE: 83 BPM | TEMPERATURE: 97.6 F | DIASTOLIC BLOOD PRESSURE: 92 MMHG | WEIGHT: 92 LBS

## 2019-11-06 VITALS
SYSTOLIC BLOOD PRESSURE: 105 MMHG | RESPIRATION RATE: 15 BRPM | DIASTOLIC BLOOD PRESSURE: 78 MMHG | OXYGEN SATURATION: 94 %

## 2019-11-06 LAB
APPEARANCE BAL (LAVAGE): ABNORMAL
APTT: 36.6 SEC (ref 26–36)
BANDED NEUTROPHILS RELATIVE PERCENT: 1 % (ref 0–7)
BASOPHILS ABSOLUTE: 0 K/UL (ref 0–0.2)
BASOPHILS RELATIVE PERCENT: 0 %
CLOT EVALUATION BAL: ABNORMAL
COLOR LAVAGE: COLORLESS
EOSINOPHILS ABSOLUTE: 0 K/UL (ref 0–0.6)
EOSINOPHILS RELATIVE PERCENT: 0 %
EPITHELIAL CELLS FLUID: 9 %
HCT VFR BLD CALC: 52.2 % (ref 40.5–52.5)
HEMOGLOBIN: 17.8 G/DL (ref 13.5–17.5)
INR BLD: 0.97 (ref 0.86–1.14)
LYMPHOCYTES ABSOLUTE: 1.8 K/UL (ref 1–5.1)
LYMPHOCYTES RELATIVE PERCENT: 18 %
LYMPHOCYTES, BAL: 7 % (ref 5–10)
MACROPHAGES, BAL: 30 % (ref 90–95)
MCH RBC QN AUTO: 31.2 PG (ref 26–34)
MCHC RBC AUTO-ENTMCNC: 34.1 G/DL (ref 31–36)
MCV RBC AUTO: 91.5 FL (ref 80–100)
MONOCYTES ABSOLUTE: 0.6 K/UL (ref 0–1.3)
MONOCYTES RELATIVE PERCENT: 6 %
MONOCYTES, BAL: 7 %
NEUTROPHILS ABSOLUTE: 7.7 K/UL (ref 1.7–7.7)
NEUTROPHILS RELATIVE PERCENT: 75 %
NUMBER OF CELLS COUNTED BAL (LAVAGE): 200
PDW BLD-RTO: 13.9 % (ref 12.4–15.4)
PLATELET # BLD: 251 K/UL (ref 135–450)
PLATELET SLIDE REVIEW: ADEQUATE
PMV BLD AUTO: 7 FL (ref 5–10.5)
PROTHROMBIN TIME: 11.1 SEC (ref 9.8–13)
RBC # BLD: 5.71 M/UL (ref 4.2–5.9)
RBC, BAL: 6 /CUMM
SEGMENTED NEUTROPHILS, BAL: 47 % (ref 5–10)
SLIDE REVIEW: ABNORMAL
WBC # BLD: 10.1 K/UL (ref 4–11)
WBC/EPI CELLS BAL: 78 /CUMM

## 2019-11-06 PROCEDURE — 87015 SPECIMEN INFECT AGNT CONCNTJ: CPT

## 2019-11-06 PROCEDURE — 3609010800 HC BRONCHOSCOPY ALVEOLAR LAVAGE: Performed by: INTERNAL MEDICINE

## 2019-11-06 PROCEDURE — 85610 PROTHROMBIN TIME: CPT

## 2019-11-06 PROCEDURE — 2709999900 HC NON-CHARGEABLE SUPPLY: Performed by: INTERNAL MEDICINE

## 2019-11-06 PROCEDURE — 7100000011 HC PHASE II RECOVERY - ADDTL 15 MIN: Performed by: INTERNAL MEDICINE

## 2019-11-06 PROCEDURE — 88312 SPECIAL STAINS GROUP 1: CPT

## 2019-11-06 PROCEDURE — 36415 COLL VENOUS BLD VENIPUNCTURE: CPT

## 2019-11-06 PROCEDURE — 89051 BODY FLUID CELL COUNT: CPT

## 2019-11-06 PROCEDURE — 85730 THROMBOPLASTIN TIME PARTIAL: CPT

## 2019-11-06 PROCEDURE — 6370000000 HC RX 637 (ALT 250 FOR IP): Performed by: ANESTHESIOLOGY

## 2019-11-06 PROCEDURE — 88112 CYTOPATH CELL ENHANCE TECH: CPT

## 2019-11-06 PROCEDURE — 7100000000 HC PACU RECOVERY - FIRST 15 MIN: Performed by: INTERNAL MEDICINE

## 2019-11-06 PROCEDURE — 2500000003 HC RX 250 WO HCPCS: Performed by: NURSE ANESTHETIST, CERTIFIED REGISTERED

## 2019-11-06 PROCEDURE — 3700000000 HC ANESTHESIA ATTENDED CARE: Performed by: INTERNAL MEDICINE

## 2019-11-06 PROCEDURE — 2580000003 HC RX 258: Performed by: ANESTHESIOLOGY

## 2019-11-06 PROCEDURE — 88305 TISSUE EXAM BY PATHOLOGIST: CPT

## 2019-11-06 PROCEDURE — 85025 COMPLETE CBC W/AUTO DIFF WBC: CPT

## 2019-11-06 PROCEDURE — 7100000010 HC PHASE II RECOVERY - FIRST 15 MIN: Performed by: INTERNAL MEDICINE

## 2019-11-06 PROCEDURE — 2580000003 HC RX 258: Performed by: NURSE ANESTHETIST, CERTIFIED REGISTERED

## 2019-11-06 PROCEDURE — 87206 SMEAR FLUORESCENT/ACID STAI: CPT

## 2019-11-06 PROCEDURE — 7100000001 HC PACU RECOVERY - ADDTL 15 MIN: Performed by: INTERNAL MEDICINE

## 2019-11-06 PROCEDURE — 87205 SMEAR GRAM STAIN: CPT

## 2019-11-06 PROCEDURE — 87102 FUNGUS ISOLATION CULTURE: CPT

## 2019-11-06 PROCEDURE — 87116 MYCOBACTERIA CULTURE: CPT

## 2019-11-06 PROCEDURE — 6360000002 HC RX W HCPCS: Performed by: NURSE ANESTHETIST, CERTIFIED REGISTERED

## 2019-11-06 PROCEDURE — 3609027000 HC BRONCHOSCOPY: Performed by: INTERNAL MEDICINE

## 2019-11-06 PROCEDURE — 6370000000 HC RX 637 (ALT 250 FOR IP): Performed by: INTERNAL MEDICINE

## 2019-11-06 PROCEDURE — 87070 CULTURE OTHR SPECIMN AEROBIC: CPT

## 2019-11-06 PROCEDURE — 31624 DX BRONCHOSCOPE/LAVAGE: CPT | Performed by: INTERNAL MEDICINE

## 2019-11-06 RX ORDER — LIDOCAINE HYDROCHLORIDE 10 MG/ML
1 INJECTION, SOLUTION EPIDURAL; INFILTRATION; INTRACAUDAL; PERINEURAL
Status: DISCONTINUED | OUTPATIENT
Start: 2019-11-06 | End: 2019-11-06 | Stop reason: HOSPADM

## 2019-11-06 RX ORDER — HYDROMORPHONE HCL 110MG/55ML
0.5 PATIENT CONTROLLED ANALGESIA SYRINGE INTRAVENOUS EVERY 5 MIN PRN
Status: DISCONTINUED | OUTPATIENT
Start: 2019-11-06 | End: 2019-11-06 | Stop reason: HOSPADM

## 2019-11-06 RX ORDER — SODIUM CHLORIDE 9 MG/ML
INJECTION, SOLUTION INTRAVENOUS CONTINUOUS PRN
Status: DISCONTINUED | OUTPATIENT
Start: 2019-11-06 | End: 2019-11-06 | Stop reason: SDUPTHER

## 2019-11-06 RX ORDER — PROPOFOL 10 MG/ML
INJECTION, EMULSION INTRAVENOUS PRN
Status: DISCONTINUED | OUTPATIENT
Start: 2019-11-06 | End: 2019-11-06 | Stop reason: SDUPTHER

## 2019-11-06 RX ORDER — PROPOFOL 10 MG/ML
INJECTION, EMULSION INTRAVENOUS CONTINUOUS PRN
Status: DISCONTINUED | OUTPATIENT
Start: 2019-11-06 | End: 2019-11-06 | Stop reason: SDUPTHER

## 2019-11-06 RX ORDER — LIDOCAINE HYDROCHLORIDE 40 MG/ML
SOLUTION TOPICAL PRN
Status: DISCONTINUED | OUTPATIENT
Start: 2019-11-06 | End: 2019-11-06 | Stop reason: ALTCHOICE

## 2019-11-06 RX ORDER — SODIUM CHLORIDE 0.9 % (FLUSH) 0.9 %
10 SYRINGE (ML) INJECTION EVERY 12 HOURS SCHEDULED
Status: DISCONTINUED | OUTPATIENT
Start: 2019-11-06 | End: 2019-11-06 | Stop reason: HOSPADM

## 2019-11-06 RX ORDER — SODIUM CHLORIDE 9 MG/ML
INJECTION, SOLUTION INTRAVENOUS CONTINUOUS
Status: DISCONTINUED | OUTPATIENT
Start: 2019-11-06 | End: 2019-11-06 | Stop reason: HOSPADM

## 2019-11-06 RX ORDER — HYDRALAZINE HYDROCHLORIDE 20 MG/ML
5 INJECTION INTRAMUSCULAR; INTRAVENOUS EVERY 10 MIN PRN
Status: DISCONTINUED | OUTPATIENT
Start: 2019-11-06 | End: 2019-11-06 | Stop reason: HOSPADM

## 2019-11-06 RX ORDER — SODIUM CHLORIDE 0.9 % (FLUSH) 0.9 %
10 SYRINGE (ML) INJECTION PRN
Status: DISCONTINUED | OUTPATIENT
Start: 2019-11-06 | End: 2019-11-06 | Stop reason: HOSPADM

## 2019-11-06 RX ORDER — OXYCODONE HYDROCHLORIDE AND ACETAMINOPHEN 5; 325 MG/1; MG/1
1 TABLET ORAL
Status: DISCONTINUED | OUTPATIENT
Start: 2019-11-06 | End: 2019-11-06 | Stop reason: HOSPADM

## 2019-11-06 RX ORDER — SODIUM CHLORIDE, SODIUM LACTATE, POTASSIUM CHLORIDE, CALCIUM CHLORIDE 600; 310; 30; 20 MG/100ML; MG/100ML; MG/100ML; MG/100ML
INJECTION, SOLUTION INTRAVENOUS CONTINUOUS
Status: DISCONTINUED | OUTPATIENT
Start: 2019-11-06 | End: 2019-11-06 | Stop reason: HOSPADM

## 2019-11-06 RX ORDER — FENTANYL CITRATE 50 UG/ML
25 INJECTION, SOLUTION INTRAMUSCULAR; INTRAVENOUS EVERY 5 MIN PRN
Status: DISCONTINUED | OUTPATIENT
Start: 2019-11-06 | End: 2019-11-06 | Stop reason: HOSPADM

## 2019-11-06 RX ORDER — PROCHLORPERAZINE EDISYLATE 5 MG/ML
5 INJECTION INTRAMUSCULAR; INTRAVENOUS
Status: DISCONTINUED | OUTPATIENT
Start: 2019-11-06 | End: 2019-11-06 | Stop reason: HOSPADM

## 2019-11-06 RX ORDER — LIDOCAINE HYDROCHLORIDE 20 MG/ML
INJECTION, SOLUTION INFILTRATION; PERINEURAL PRN
Status: DISCONTINUED | OUTPATIENT
Start: 2019-11-06 | End: 2019-11-06 | Stop reason: SDUPTHER

## 2019-11-06 RX ORDER — ONDANSETRON 2 MG/ML
4 INJECTION INTRAMUSCULAR; INTRAVENOUS
Status: DISCONTINUED | OUTPATIENT
Start: 2019-11-06 | End: 2019-11-06 | Stop reason: HOSPADM

## 2019-11-06 RX ORDER — IPRATROPIUM BROMIDE AND ALBUTEROL SULFATE 2.5; .5 MG/3ML; MG/3ML
1 SOLUTION RESPIRATORY (INHALATION) ONCE
Status: COMPLETED | OUTPATIENT
Start: 2019-11-06 | End: 2019-11-06

## 2019-11-06 RX ORDER — LABETALOL HYDROCHLORIDE 5 MG/ML
5 INJECTION, SOLUTION INTRAVENOUS EVERY 10 MIN PRN
Status: DISCONTINUED | OUTPATIENT
Start: 2019-11-06 | End: 2019-11-06 | Stop reason: HOSPADM

## 2019-11-06 RX ADMIN — IPRATROPIUM BROMIDE AND ALBUTEROL SULFATE 1 AMPULE: .5; 3 SOLUTION RESPIRATORY (INHALATION) at 09:39

## 2019-11-06 RX ADMIN — LIDOCAINE HYDROCHLORIDE 100 MG: 20 INJECTION, SOLUTION INFILTRATION; PERINEURAL at 10:58

## 2019-11-06 RX ADMIN — SODIUM CHLORIDE: 9 INJECTION, SOLUTION INTRAVENOUS at 09:39

## 2019-11-06 RX ADMIN — PROPOFOL 60 MG: 10 INJECTION, EMULSION INTRAVENOUS at 10:58

## 2019-11-06 RX ADMIN — PROPOFOL 120 MCG/KG/MIN: 10 INJECTION, EMULSION INTRAVENOUS at 10:58

## 2019-11-06 RX ADMIN — SODIUM CHLORIDE: 9 INJECTION, SOLUTION INTRAVENOUS at 10:51

## 2019-11-06 ASSESSMENT — PULMONARY FUNCTION TESTS
PIF_VALUE: 1
PIF_VALUE: 2
PIF_VALUE: 1

## 2019-11-06 ASSESSMENT — ENCOUNTER SYMPTOMS: SHORTNESS OF BREATH: 0

## 2019-11-06 ASSESSMENT — PAIN - FUNCTIONAL ASSESSMENT: PAIN_FUNCTIONAL_ASSESSMENT: 0-10

## 2019-11-08 LAB
CULTURE, RESPIRATORY: NORMAL
CULTURE, RESPIRATORY: NORMAL
GRAM STAIN RESULT: NORMAL
GRAM STAIN RESULT: NORMAL

## 2019-11-29 ENCOUNTER — HOSPITAL ENCOUNTER (OUTPATIENT)
Dept: ULTRASOUND IMAGING | Age: 64
Discharge: HOME | End: 2019-11-29
Payer: COMMERCIAL

## 2019-11-29 DIAGNOSIS — Z13.6 SCREENING FOR AAA (ABDOMINAL AORTIC ANEURYSM): ICD-10-CM

## 2019-11-29 PROCEDURE — 76775 US EXAM ABDO BACK WALL LIM: CPT

## 2019-12-09 LAB
FUNGUS (MYCOLOGY) CULTURE: NORMAL
FUNGUS (MYCOLOGY) CULTURE: NORMAL
FUNGUS STAIN: NORMAL
FUNGUS STAIN: NORMAL

## 2019-12-12 ENCOUNTER — HOSPITAL ENCOUNTER (OUTPATIENT)
Age: 64
Discharge: HOME | End: 2019-12-12
Payer: COMMERCIAL

## 2019-12-12 DIAGNOSIS — I49.9 IRREGULAR HEART BEATS: ICD-10-CM

## 2019-12-12 DIAGNOSIS — Z13.6 SCREENING FOR AAA (ABDOMINAL AORTIC ANEURYSM): ICD-10-CM

## 2019-12-12 DIAGNOSIS — B44.1 PULMONARY ASPERGILLOSIS (HCC): ICD-10-CM

## 2019-12-12 DIAGNOSIS — Z11.59 ENCOUNTER FOR HEPATITIS C SCREENING TEST FOR LOW RISK PATIENT: ICD-10-CM

## 2019-12-12 LAB
A/G RATIO: 1.7 (ref 1.1–2.2)
ALBUMIN SERPL-MCNC: 4.3 G/DL (ref 3.4–5)
ALP BLD-CCNC: 87 U/L (ref 40–129)
ALT SERPL-CCNC: 29 U/L (ref 10–40)
ANION GAP SERPL CALCULATED.3IONS-SCNC: 13 MMOL/L (ref 3–16)
ANISOCYTOSIS: ABNORMAL
AST SERPL-CCNC: 24 U/L (ref 15–37)
BANDED NEUTROPHILS RELATIVE PERCENT: 2 % (ref 0–7)
BASOPHILS ABSOLUTE: 0.2 K/UL (ref 0–0.2)
BASOPHILS RELATIVE PERCENT: 2 %
BILIRUB SERPL-MCNC: 0.3 MG/DL (ref 0–1)
BUN BLDV-MCNC: 11 MG/DL (ref 7–20)
CALCIUM SERPL-MCNC: 9.7 MG/DL (ref 8.3–10.6)
CHLORIDE BLD-SCNC: 102 MMOL/L (ref 99–110)
CHOLESTEROL, TOTAL: 181 MG/DL (ref 0–199)
CO2: 25 MMOL/L (ref 21–32)
CREAT SERPL-MCNC: 0.6 MG/DL (ref 0.8–1.3)
EOSINOPHILS ABSOLUTE: 0 K/UL (ref 0–0.6)
EOSINOPHILS RELATIVE PERCENT: 0 %
GFR AFRICAN AMERICAN: >60
GFR NON-AFRICAN AMERICAN: >60
GLOBULIN: 2.6 G/DL
GLUCOSE BLD-MCNC: 106 MG/DL (ref 70–99)
HCT VFR BLD CALC: 50 % (ref 40.5–52.5)
HDLC SERPL-MCNC: 70 MG/DL (ref 40–60)
HEMOGLOBIN: 16.9 G/DL (ref 13.5–17.5)
HEPATITIS C ANTIBODY INTERPRETATION: NORMAL
LDL CHOLESTEROL CALCULATED: 93 MG/DL
LYMPHOCYTES ABSOLUTE: 0.8 K/UL (ref 1–5.1)
LYMPHOCYTES RELATIVE PERCENT: 7 %
MCH RBC QN AUTO: 30.9 PG (ref 26–34)
MCHC RBC AUTO-ENTMCNC: 33.7 G/DL (ref 31–36)
MCV RBC AUTO: 91.8 FL (ref 80–100)
MONOCYTES ABSOLUTE: 1 K/UL (ref 0–1.3)
MONOCYTES RELATIVE PERCENT: 8 %
NEUTROPHILS ABSOLUTE: 10 K/UL (ref 1.7–7.7)
NEUTROPHILS RELATIVE PERCENT: 81 %
PDW BLD-RTO: 15 % (ref 12.4–15.4)
PLATELET # BLD: 251 K/UL (ref 135–450)
PLATELET SLIDE REVIEW: ADEQUATE
PMV BLD AUTO: 7.6 FL (ref 5–10.5)
POTASSIUM SERPL-SCNC: 4.1 MMOL/L (ref 3.5–5.1)
RBC # BLD: 5.45 M/UL (ref 4.2–5.9)
SLIDE REVIEW: ABNORMAL
SODIUM BLD-SCNC: 140 MMOL/L (ref 136–145)
TOTAL PROTEIN: 6.9 G/DL (ref 6.4–8.2)
TRIGL SERPL-MCNC: 92 MG/DL (ref 0–150)
VLDLC SERPL CALC-MCNC: 18 MG/DL
WBC # BLD: 12 K/UL (ref 4–11)

## 2019-12-12 PROCEDURE — 80299 QUANTITATIVE ASSAY DRUG: CPT

## 2019-12-12 PROCEDURE — 36415 COLL VENOUS BLD VENIPUNCTURE: CPT

## 2019-12-12 PROCEDURE — 85025 COMPLETE CBC W/AUTO DIFF WBC: CPT

## 2019-12-12 PROCEDURE — 80061 LIPID PANEL: CPT

## 2019-12-12 PROCEDURE — 80053 COMPREHEN METABOLIC PANEL: CPT

## 2019-12-12 PROCEDURE — 86803 HEPATITIS C AB TEST: CPT

## 2019-12-14 LAB — MISCELLANEOUS LAB TEST ORDER: NORMAL

## 2019-12-16 ENCOUNTER — HOSPITAL ENCOUNTER (OUTPATIENT)
Dept: CT IMAGING | Age: 64
Discharge: HOME | End: 2019-12-16
Payer: COMMERCIAL

## 2019-12-16 DIAGNOSIS — R91.1 PULMONARY NODULE: ICD-10-CM

## 2019-12-16 PROCEDURE — 71250 CT THORAX DX C-: CPT

## 2019-12-18 ENCOUNTER — TELEPHONE (OUTPATIENT)
Dept: PULMONOLOGY | Age: 64
End: 2019-12-18

## 2019-12-18 DIAGNOSIS — R91.1 PULMONARY NODULE: Primary | ICD-10-CM

## 2019-12-19 ENCOUNTER — OFFICE VISIT (OUTPATIENT)
Dept: INFECTIOUS DISEASES | Age: 64
End: 2019-12-19
Payer: COMMERCIAL

## 2019-12-19 VITALS
HEIGHT: 64 IN | RESPIRATION RATE: 14 BRPM | HEART RATE: 110 BPM | SYSTOLIC BLOOD PRESSURE: 130 MMHG | WEIGHT: 89 LBS | OXYGEN SATURATION: 93 % | DIASTOLIC BLOOD PRESSURE: 72 MMHG | BODY MASS INDEX: 15.19 KG/M2

## 2019-12-19 DIAGNOSIS — K21.9 GASTROESOPHAGEAL REFLUX DISEASE WITHOUT ESOPHAGITIS: ICD-10-CM

## 2019-12-19 DIAGNOSIS — Z51.81 THERAPEUTIC DRUG MONITORING: ICD-10-CM

## 2019-12-19 DIAGNOSIS — J84.10 GRANULOMATOUS LUNG DISEASE (HCC): ICD-10-CM

## 2019-12-19 DIAGNOSIS — F17.200 SMOKER: ICD-10-CM

## 2019-12-19 DIAGNOSIS — R91.8 MULTIPLE LUNG NODULES ON CT: ICD-10-CM

## 2019-12-19 DIAGNOSIS — J47.9 BRONCHIECTASIS WITHOUT COMPLICATION (HCC): ICD-10-CM

## 2019-12-19 DIAGNOSIS — R63.4 WEIGHT LOSS: ICD-10-CM

## 2019-12-19 DIAGNOSIS — D80.1 HYPOGAMMAGLOBULINEMIA (HCC): ICD-10-CM

## 2019-12-19 DIAGNOSIS — J43.2 CENTRILOBULAR EMPHYSEMA (HCC): ICD-10-CM

## 2019-12-19 DIAGNOSIS — J44.9 CHRONIC OBSTRUCTIVE PULMONARY DISEASE, UNSPECIFIED COPD TYPE (HCC): ICD-10-CM

## 2019-12-19 DIAGNOSIS — B44.1 PULMONARY ASPERGILLOSIS (HCC): Primary | ICD-10-CM

## 2019-12-19 DIAGNOSIS — Z71.89 ENCOUNTER FOR MEDICATION COUNSELING: ICD-10-CM

## 2019-12-19 DIAGNOSIS — F31.9 BIPOLAR AFFECTIVE DISORDER, REMISSION STATUS UNSPECIFIED (HCC): ICD-10-CM

## 2019-12-19 DIAGNOSIS — Z71.6 TOBACCO ABUSE COUNSELING: ICD-10-CM

## 2019-12-19 PROCEDURE — 3017F COLORECTAL CA SCREEN DOC REV: CPT | Performed by: INTERNAL MEDICINE

## 2019-12-19 PROCEDURE — 99214 OFFICE O/P EST MOD 30 MIN: CPT | Performed by: INTERNAL MEDICINE

## 2019-12-19 PROCEDURE — 1123F ACP DISCUSS/DSCN MKR DOCD: CPT | Performed by: INTERNAL MEDICINE

## 2019-12-19 PROCEDURE — 3023F SPIROM DOC REV: CPT | Performed by: INTERNAL MEDICINE

## 2019-12-19 PROCEDURE — G8926 SPIRO NO PERF OR DOC: HCPCS | Performed by: INTERNAL MEDICINE

## 2019-12-19 PROCEDURE — G8419 CALC BMI OUT NRM PARAM NOF/U: HCPCS | Performed by: INTERNAL MEDICINE

## 2019-12-19 PROCEDURE — 1036F TOBACCO NON-USER: CPT | Performed by: INTERNAL MEDICINE

## 2019-12-19 PROCEDURE — G8482 FLU IMMUNIZE ORDER/ADMIN: HCPCS | Performed by: INTERNAL MEDICINE

## 2019-12-19 PROCEDURE — 4040F PNEUMOC VAC/ADMIN/RCVD: CPT | Performed by: INTERNAL MEDICINE

## 2019-12-19 PROCEDURE — G8427 DOCREV CUR MEDS BY ELIG CLIN: HCPCS | Performed by: INTERNAL MEDICINE

## 2019-12-19 RX ORDER — POSACONAZOLE 100 MG/1
200 TABLET, DELAYED RELEASE ORAL
Qty: 60 TABLET | Refills: 2 | Status: SHIPPED | OUTPATIENT
Start: 2019-12-19 | End: 2020-03-18

## 2019-12-19 ASSESSMENT — ENCOUNTER SYMPTOMS
WHEEZING: 0
COUGH: 0
EYE REDNESS: 0
SORE THROAT: 0
BACK PAIN: 0
NAUSEA: 0
TROUBLE SWALLOWING: 0
SHORTNESS OF BREATH: 0
RHINORRHEA: 0
ABDOMINAL PAIN: 0
DIARRHEA: 0
EYE DISCHARGE: 0
CONSTIPATION: 0

## 2019-12-24 LAB
AFB CULTURE (MYCOBACTERIA): NORMAL
AFB CULTURE (MYCOBACTERIA): NORMAL
AFB SMEAR: NORMAL
AFB SMEAR: NORMAL

## 2020-01-09 ENCOUNTER — APPOINTMENT (OUTPATIENT)
Dept: GENERAL RADIOLOGY | Age: 65
DRG: 193 | End: 2020-01-09
Payer: COMMERCIAL

## 2020-01-09 ENCOUNTER — HOSPITAL ENCOUNTER (INPATIENT)
Age: 65
LOS: 6 days | Discharge: HOME | DRG: 193 | End: 2020-01-15
Attending: EMERGENCY MEDICINE | Admitting: HOSPITALIST
Payer: COMMERCIAL

## 2020-01-09 PROBLEM — J16.0 CAP (COMMUNITY ACQUIRED PNEUMONIA) DUE TO CHLAMYDIA SPECIES: Status: ACTIVE | Noted: 2020-01-09

## 2020-01-09 LAB
A/G RATIO: 0.9 (ref 1.1–2.2)
ALBUMIN SERPL-MCNC: 3.2 G/DL (ref 3.4–5)
ALP BLD-CCNC: 91 U/L (ref 40–129)
ALT SERPL-CCNC: 20 U/L (ref 10–40)
ANION GAP SERPL CALCULATED.3IONS-SCNC: 12 MMOL/L (ref 3–16)
AST SERPL-CCNC: 22 U/L (ref 15–37)
BANDED NEUTROPHILS RELATIVE PERCENT: 15 % (ref 0–7)
BASOPHILS ABSOLUTE: 0 K/UL (ref 0–0.2)
BASOPHILS RELATIVE PERCENT: 0 %
BILIRUB SERPL-MCNC: 0.5 MG/DL (ref 0–1)
BILIRUBIN URINE: NEGATIVE
BLOOD, URINE: NEGATIVE
BUN BLDV-MCNC: 11 MG/DL (ref 7–20)
CALCIUM SERPL-MCNC: 9.3 MG/DL (ref 8.3–10.6)
CHLORIDE BLD-SCNC: 99 MMOL/L (ref 99–110)
CLARITY: CLEAR
CO2: 23 MMOL/L (ref 21–32)
COLOR: YELLOW
CREAT SERPL-MCNC: 0.7 MG/DL (ref 0.8–1.3)
EKG ATRIAL RATE: 133 BPM
EKG DIAGNOSIS: NORMAL
EKG P AXIS: 89 DEGREES
EKG P-R INTERVAL: 144 MS
EKG Q-T INTERVAL: 356 MS
EKG QRS DURATION: 66 MS
EKG QTC CALCULATION (BAZETT): 472 MS
EKG R AXIS: 56 DEGREES
EKG T AXIS: 63 DEGREES
EKG VENTRICULAR RATE: 106 BPM
EOSINOPHILS ABSOLUTE: 0 K/UL (ref 0–0.6)
EOSINOPHILS RELATIVE PERCENT: 0 %
GFR AFRICAN AMERICAN: >60
GFR NON-AFRICAN AMERICAN: >60
GLOBULIN: 3.5 G/DL
GLUCOSE BLD-MCNC: 115 MG/DL (ref 70–99)
GLUCOSE URINE: NEGATIVE MG/DL
HCT VFR BLD CALC: 45.3 % (ref 40.5–52.5)
HEMOGLOBIN: 15.4 G/DL (ref 13.5–17.5)
KETONES, URINE: ABNORMAL MG/DL
LACTIC ACID: 1.4 MMOL/L (ref 0.4–2)
LEUKOCYTE ESTERASE, URINE: NEGATIVE
LYMPHOCYTES ABSOLUTE: 0.6 K/UL (ref 1–5.1)
LYMPHOCYTES RELATIVE PERCENT: 3 %
MCH RBC QN AUTO: 31.1 PG (ref 26–34)
MCHC RBC AUTO-ENTMCNC: 34.1 G/DL (ref 31–36)
MCV RBC AUTO: 91.1 FL (ref 80–100)
MICROSCOPIC EXAMINATION: ABNORMAL
MONOCYTES ABSOLUTE: 1.1 K/UL (ref 0–1.3)
MONOCYTES RELATIVE PERCENT: 5 %
NEUTROPHILS ABSOLUTE: 19.4 K/UL (ref 1.7–7.7)
NEUTROPHILS RELATIVE PERCENT: 77 %
NITRITE, URINE: NEGATIVE
PDW BLD-RTO: 14.5 % (ref 12.4–15.4)
PH UA: 6 (ref 5–8)
PLATELET # BLD: 275 K/UL (ref 135–450)
PMV BLD AUTO: 7.3 FL (ref 5–10.5)
POTASSIUM REFLEX MAGNESIUM: 4 MMOL/L (ref 3.5–5.1)
PRO-BNP: 223 PG/ML (ref 0–124)
PROTEIN UA: NEGATIVE MG/DL
RAPID INFLUENZA  B AGN: NEGATIVE
RAPID INFLUENZA A AGN: NEGATIVE
RBC # BLD: 4.97 M/UL (ref 4.2–5.9)
SODIUM BLD-SCNC: 134 MMOL/L (ref 136–145)
SPECIFIC GRAVITY UA: 1.01 (ref 1–1.03)
TOTAL PROTEIN: 6.7 G/DL (ref 6.4–8.2)
TROPONIN: <0.01 NG/ML
URINE REFLEX TO CULTURE: ABNORMAL
URINE TYPE: ABNORMAL
UROBILINOGEN, URINE: 1 E.U./DL
WBC # BLD: 21.1 K/UL (ref 4–11)

## 2020-01-09 PROCEDURE — 93010 ELECTROCARDIOGRAM REPORT: CPT | Performed by: INTERNAL MEDICINE

## 2020-01-09 PROCEDURE — 83605 ASSAY OF LACTIC ACID: CPT

## 2020-01-09 PROCEDURE — 87804 INFLUENZA ASSAY W/OPTIC: CPT

## 2020-01-09 PROCEDURE — 87449 NOS EACH ORGANISM AG IA: CPT

## 2020-01-09 PROCEDURE — 80053 COMPREHEN METABOLIC PANEL: CPT

## 2020-01-09 PROCEDURE — 87040 BLOOD CULTURE FOR BACTERIA: CPT

## 2020-01-09 PROCEDURE — 6360000002 HC RX W HCPCS: Performed by: EMERGENCY MEDICINE

## 2020-01-09 PROCEDURE — 6370000000 HC RX 637 (ALT 250 FOR IP): Performed by: HOSPITALIST

## 2020-01-09 PROCEDURE — 2580000003 HC RX 258: Performed by: EMERGENCY MEDICINE

## 2020-01-09 PROCEDURE — 94640 AIRWAY INHALATION TREATMENT: CPT

## 2020-01-09 PROCEDURE — 84484 ASSAY OF TROPONIN QUANT: CPT

## 2020-01-09 PROCEDURE — 71046 X-RAY EXAM CHEST 2 VIEWS: CPT

## 2020-01-09 PROCEDURE — 94761 N-INVAS EAR/PLS OXIMETRY MLT: CPT

## 2020-01-09 PROCEDURE — 96365 THER/PROPH/DIAG IV INF INIT: CPT

## 2020-01-09 PROCEDURE — 96367 TX/PROPH/DG ADDL SEQ IV INF: CPT

## 2020-01-09 PROCEDURE — 6370000000 HC RX 637 (ALT 250 FOR IP): Performed by: EMERGENCY MEDICINE

## 2020-01-09 PROCEDURE — G0378 HOSPITAL OBSERVATION PER HR: HCPCS

## 2020-01-09 PROCEDURE — 2580000003 HC RX 258

## 2020-01-09 PROCEDURE — 99285 EMERGENCY DEPT VISIT HI MDM: CPT

## 2020-01-09 PROCEDURE — 93005 ELECTROCARDIOGRAM TRACING: CPT | Performed by: EMERGENCY MEDICINE

## 2020-01-09 PROCEDURE — 96375 TX/PRO/DX INJ NEW DRUG ADDON: CPT

## 2020-01-09 PROCEDURE — 81003 URINALYSIS AUTO W/O SCOPE: CPT

## 2020-01-09 PROCEDURE — 83880 ASSAY OF NATRIURETIC PEPTIDE: CPT

## 2020-01-09 PROCEDURE — 6360000002 HC RX W HCPCS: Performed by: HOSPITALIST

## 2020-01-09 PROCEDURE — 1200000000 HC SEMI PRIVATE

## 2020-01-09 PROCEDURE — 85025 COMPLETE CBC W/AUTO DIFF WBC: CPT

## 2020-01-09 PROCEDURE — 96366 THER/PROPH/DIAG IV INF ADDON: CPT

## 2020-01-09 PROCEDURE — 99223 1ST HOSP IP/OBS HIGH 75: CPT | Performed by: INTERNAL MEDICINE

## 2020-01-09 RX ORDER — ONDANSETRON 2 MG/ML
4 INJECTION INTRAMUSCULAR; INTRAVENOUS EVERY 6 HOURS PRN
Status: DISCONTINUED | OUTPATIENT
Start: 2020-01-09 | End: 2020-01-15 | Stop reason: HOSPADM

## 2020-01-09 RX ORDER — PANTOPRAZOLE SODIUM 40 MG/1
40 TABLET, DELAYED RELEASE ORAL
Status: DISCONTINUED | OUTPATIENT
Start: 2020-01-10 | End: 2020-01-15 | Stop reason: HOSPADM

## 2020-01-09 RX ORDER — 0.9 % SODIUM CHLORIDE 0.9 %
30 INTRAVENOUS SOLUTION INTRAVENOUS ONCE
Status: COMPLETED | OUTPATIENT
Start: 2020-01-09 | End: 2020-01-09

## 2020-01-09 RX ORDER — BENZTROPINE MESYLATE 1 MG/1
1 TABLET ORAL DAILY
Status: DISCONTINUED | OUTPATIENT
Start: 2020-01-09 | End: 2020-01-15 | Stop reason: HOSPADM

## 2020-01-09 RX ORDER — POSACONAZOLE 100 MG/1
200 TABLET, DELAYED RELEASE ORAL
Status: DISCONTINUED | OUTPATIENT
Start: 2020-01-09 | End: 2020-01-09

## 2020-01-09 RX ORDER — SODIUM CHLORIDE 0.9 % (FLUSH) 0.9 %
10 SYRINGE (ML) INJECTION EVERY 12 HOURS SCHEDULED
Status: DISCONTINUED | OUTPATIENT
Start: 2020-01-09 | End: 2020-01-15 | Stop reason: HOSPADM

## 2020-01-09 RX ORDER — METHYLPREDNISOLONE SODIUM SUCCINATE 125 MG/2ML
125 INJECTION, POWDER, LYOPHILIZED, FOR SOLUTION INTRAMUSCULAR; INTRAVENOUS ONCE
Status: COMPLETED | OUTPATIENT
Start: 2020-01-09 | End: 2020-01-09

## 2020-01-09 RX ORDER — LEVOFLOXACIN 5 MG/ML
750 INJECTION, SOLUTION INTRAVENOUS EVERY 24 HOURS
Status: DISCONTINUED | OUTPATIENT
Start: 2020-01-09 | End: 2020-01-13

## 2020-01-09 RX ORDER — ALBUTEROL SULFATE 90 UG/1
1 AEROSOL, METERED RESPIRATORY (INHALATION) EVERY 6 HOURS PRN
Status: DISCONTINUED | OUTPATIENT
Start: 2020-01-09 | End: 2020-01-15 | Stop reason: HOSPADM

## 2020-01-09 RX ORDER — POSACONAZOLE 100 MG/1
200 TABLET, DELAYED RELEASE ORAL
Status: DISCONTINUED | OUTPATIENT
Start: 2020-01-09 | End: 2020-01-15 | Stop reason: HOSPADM

## 2020-01-09 RX ORDER — SODIUM CHLORIDE 0.9 % (FLUSH) 0.9 %
10 SYRINGE (ML) INJECTION PRN
Status: DISCONTINUED | OUTPATIENT
Start: 2020-01-09 | End: 2020-01-15 | Stop reason: HOSPADM

## 2020-01-09 RX ORDER — SODIUM CHLORIDE 9 MG/ML
INJECTION, SOLUTION INTRAVENOUS
Status: COMPLETED
Start: 2020-01-09 | End: 2020-01-09

## 2020-01-09 RX ORDER — PALIPERIDONE 3 MG/1
6 TABLET, EXTENDED RELEASE ORAL EVERY MORNING
Status: DISCONTINUED | OUTPATIENT
Start: 2020-01-09 | End: 2020-01-15 | Stop reason: HOSPADM

## 2020-01-09 RX ORDER — IPRATROPIUM BROMIDE AND ALBUTEROL SULFATE 2.5; .5 MG/3ML; MG/3ML
3 SOLUTION RESPIRATORY (INHALATION) ONCE
Status: COMPLETED | OUTPATIENT
Start: 2020-01-09 | End: 2020-01-09

## 2020-01-09 RX ADMIN — SODIUM CHLORIDE 250 ML: 9 INJECTION, SOLUTION INTRAVENOUS at 13:27

## 2020-01-09 RX ADMIN — SODIUM CHLORIDE 1224 ML: 9 INJECTION, SOLUTION INTRAVENOUS at 08:36

## 2020-01-09 RX ADMIN — AZITHROMYCIN MONOHYDRATE 500 MG: 500 INJECTION, POWDER, LYOPHILIZED, FOR SOLUTION INTRAVENOUS at 08:34

## 2020-01-09 RX ADMIN — IPRATROPIUM BROMIDE AND ALBUTEROL SULFATE 3 AMPULE: .5; 3 SOLUTION RESPIRATORY (INHALATION) at 08:11

## 2020-01-09 RX ADMIN — METHYLPREDNISOLONE SODIUM SUCCINATE 125 MG: 125 INJECTION, POWDER, FOR SOLUTION INTRAMUSCULAR; INTRAVENOUS at 08:08

## 2020-01-09 RX ADMIN — Medication 1 G: at 08:08

## 2020-01-09 RX ADMIN — Medication 2 PUFF: at 19:25

## 2020-01-09 RX ADMIN — POSACONAZOLE 200 MG: 100 TABLET, COATED ORAL at 18:37

## 2020-01-09 RX ADMIN — LEVOFLOXACIN 750 MG: 5 INJECTION, SOLUTION INTRAVENOUS at 13:23

## 2020-01-09 ASSESSMENT — PAIN SCALES - GENERAL: PAINLEVEL_OUTOF10: 0

## 2020-01-09 NOTE — ED PROVIDER NOTES
[facility] Emergency Department    CHIEF COMPLAINT  Chief Complaint   Patient presents with    Cough     hx of CPOD and lots of coughing x weeks      HISTORY OF PRESENT ILLNESS  Reece Retana is a 75 y.o. male  who presents to the ED complaining of coughing x2 weeks that is fairly constant and intractable. Has COPD history. Wet-sounding cough, mostly having a hard time getting any sputum up though. The past few days have progressively worsened. Ex-smoker, since 2013. Has history of aspergillus in the past based on a respiratory culture and is still on Noxafil (posaconazole) for it. No abdominal pains, vomiting or diarrhea. No chest pains. No fevers objectively at home. Feels nasally congested. He denies sore throat. No other complaints, modifying factors or associated symptoms. I have reviewed the following from the nursing documentation.     Past Medical History:   Diagnosis Date    Arthritis     Aspergillus ([address] 05/18/2018    resp culture    Bipolar 1 disorder (HCC)     Chronic bronchitis, obstructive (HCC)     COPD (chronic obstructive pulmonary disease) (HCC)     GERD (gastroesophageal reflux disease)     Darleen's      Past Surgical History:   Procedure Laterality Date    BRONCHOSCOPY N/A 05/18/2018    BRONCHOSCOPY N/A 11/6/2019    BRONCHOSCOPY WITH LAVAGE performed by Shefali Mistry MD at [address], COLON, DIAGNOSTIC      KNEE ARTHROSCOPY      bilateral    LUNG BIOPSY  12/2018    TONSILLECTOMY      UPPER GASTROINTESTINAL ENDOSCOPY  9/25/15    UPPER GASTROINTESTINAL ENDOSCOPY N/A 2/21/2019    EGD DILATION BALLOON performed by Tre Bae MD at [address] N/A 2/21/2019    EGD BIOPSY performed by Tre Bae MD at [facility]       Family History   Problem Relation Age of Onset    Arthritis Mother     Glaucoma Mother     High Blood Pressure Mother     Heart Disease Father         age [de-identified] COPD Father         emphysema    COPD Brother      Social History     Socioeconomic History    Marital status:      Spouse name: Not on file    Number of children: 1    Years of education: Not on file    Highest education level: Not on file   Occupational History    Not on file   Social Needs    Financial resource strain: Not on file    Food insecurity:     Worry: Not on file     Inability: Not on file    Transportation needs:     Medical: Not on file     Non-medical: Not on file   Tobacco Use    Smoking status: Former Smoker     Packs/day: 2.00     Years: 35.00     Pack years: 70.00     Types: Cigarettes     Last attempt to quit: 2013     Years since quittin.0    Smokeless tobacco: Never Used   Substance and Sexual Activity    Alcohol use: Not Currently     Alcohol/week: 0.0 standard drinks     Comment: 24 yrs sober    Drug use: No    Sexual activity: Not Currently   Lifestyle    Physical activity:     Days per week: Not on file     Minutes per session: Not on file    Stress: Not on file   Relationships    Social connections:     Talks on phone: Not on file     Gets together: Not on file     Attends Hoahaoism service: Not on file     Active member of club or organization: Not on file     Attends meetings of clubs or organizations: Not on file     Relationship status: Not on file    Intimate partner violence:     Fear of current or ex partner: Not on file     Emotionally abused: Not on file     Physically abused: Not on file     Forced sexual activity: Not on file   Other Topics Concern    Not on file   Social History Narrative    Lives home with wife.       Current Facility-Administered Medications   Medication Dose Route Frequency Provider Last Rate Last Dose    0.9 % sodium chloride bolus  30 mL/kg Intravenous Once Sylvia Basurto  mL/hr at 20 0836 1,224 mL at 20 0836    cefTRIAXone (ROCEPHIN) 1 g in sterile water 10 WITHOUT CONTRAST 12/16/2019 8:35 am TECHNIQUE: CT of the chest was performed without the administration of intravenous contrast. Multiplanar reformatted images are provided for review. Dose modulation, iterative reconstruction, and/or weight based adjustment of the mA/kV was utilized to reduce the radiation dose to as low as reasonably achievable. COMPARISON: 09/03/2019 and 05/07/2018 HISTORY: ORDERING SYSTEM PROVIDED HISTORY: Pulmonary nodule TECHNOLOGIST PROVIDED HISTORY: Reason for exam:->Pulmonary nodules Reason for Exam: Pulmonary nodules Acuity: Unknown Type of Exam: Unknown FINDINGS: Mediastinum: Coronary artery calcifications are a marker of atherosclerosis. There is an enlarging small pericardial effusion. There are no enlarged thoracic lymph nodes. Calcified granulomatous disease is noted. Lungs/pleura: The tracheobronchial tree is patent. No change in the mild bronchiectasis involving the bilateral lower lobes. There is no pneumothorax or pleural effusion. Moderate to severe emphysema involves the bilateral lungs. There is bibasilar scarring. No change in the solid right upper lobe pulmonary nodules including the 1.3 x 1.7 cm solid dominant pulmonary nodule. The previously noted right lower lobe pulmonary nodules have resolved. However, there are 2 new part solid right middle lobe pulmonary nodules, the larger of which measures 6 mm. Upper Abdomen: Images of the upper abdomen are unremarkable. Soft Tissues/Bones: Degenerative changes involve the thoracic spine. The bones are demineralized. 1. New 6 mm part solid right middle lobe pulmonary nodules. 2. Unchanged solid right upper lobe pulmonary nodules. RECOMMENDATION: Per ACR Lung-RADS Version 1.0 Category 3, Probably benign. Management:  Short-term follow-up suggested, 6 Month LDCT.  (probability of malignancy 1-2%). LUNGRADS ASSESSMENT^LUNGRADS^LUNG RADS SCREENING ASSESSMENT^LUNGRADS^3      ED COURSE/MDM  Patient seen and evaluated.  Old records reviewed. Labs and imaging reviewed and results discussed with patient. After initial evaluation, differential diagnostic considerations included: acute coronary syndrome, pulmonary embolism, COPD/asthma, pneumonia, sepsis, pericardial tamponade, pneumothorax, CHF, thoracic aortic dissection, anxiety    The patient's ED workup was notable for sepsis from pneumonia, rapid flu negative, no endorgan dysfunction. Patient was given community-acquired coverage with Rocephin and azithromycin, steroids and nebulizers as well and IV fluids. Patient has notable leukocytosis but lactate is normal.  Blood cultures are obtained. Admit. SEP-1 CORE MEASURE DATA    Classification: exclude from core measure    Exclusion criteria: the patient is NOT to be included for sepsis due to:  Patient has sepsis and no end-organ dysfunction is identified and so is not to be included    During the patient's ED course, the patient was given:  Medications   0.9 % sodium chloride bolus (1,224 mLs Intravenous New Bag 1/9/20 0836)   cefTRIAXone (ROCEPHIN) 1 g in sterile water 10 mL IV syringe (1 g Intravenous Given 1/9/20 0808)   azithromycin (ZITHROMAX) 500 mg in D5W 250ml Vial Mate (500 mg Intravenous New Bag 1/9/20 0834)   ipratropium-albuterol (DUONEB) nebulizer solution 3 ampule (3 ampules Inhalation Given 1/9/20 0811)   methylPREDNISolone sodium (SOLU-MEDROL) injection 125 mg (125 mg Intravenous Given 1/9/20 0808)        CLINICAL IMPRESSION  1. Pneumonia due to organism    2. Septicemia [address] [address]        Blood pressure 124/84, pulse 90, temperature 98.8 °F (37.1 °C), temperature source Oral, resp. rate 20, weight 90 lb (40.8 kg), SpO2 94 %. Nevaeh Hanson was admitted in fair condition. I have discussed the findings of today's workup with the patient and addressed the patient's questions and concerns. The plan is to admit to the hospital at this time under the hospitalist service.   I spoke with Dr. Shanna Worley who

## 2020-01-09 NOTE — H&P
Hospital Medicine History & Physical      PCP: Anna Licea MD    Date of Admission: 1/9/2020    Date of Service: Pt seen/examined on 1/9/2020 and Admitted to Inpatient with expected LOS greater than two midnights due to medical therapy. Chief Complaint: Cough      History Of Present Illness:      75 y.o. male who has a history of COPD, aspergillosis, bipolar affective disorder, schizophrenia presented to [facility] with 2 weeks history of cough and shortness of breath. The patient is not producing much sputum. This will start with runny nose, runny eyes and dry cough about 2 weeks ago, no sick contacts around him. He has history of COPD and followed by Dr. Shannan Hernandez, patient also has a history of aspergillosis and he is currently on posaconazole. In the emergency room work-up showed WBC 21.1 sodium 134 creatinine 0.7, glucose 115 chest x-ray showed left lower lobe infiltrates suggesting pneumonia. Hospitalist service called for further management of this patient.     Past Medical History:          Diagnosis Date    Arthritis     Aspergillus ([address] 05/18/2018    resp culture    Bipolar 1 disorder (HCC)     Chronic bronchitis, obstructive (HCC)     COPD (chronic obstructive pulmonary disease) (HCC)     GERD (gastroesophageal reflux disease)     Marlynette's        Past Surgical History:          Procedure Laterality Date    BRONCHOSCOPY N/A 05/18/2018    BRONCHOSCOPY N/A 11/6/2019    BRONCHOSCOPY WITH LAVAGE performed by Pinky De Oliveira MD at [address], COLON, DIAGNOSTIC      KNEE ARTHROSCOPY      bilateral    LUNG BIOPSY  12/2018    TONSILLECTOMY      UPPER GASTROINTESTINAL ENDOSCOPY  9/25/15    UPPER GASTROINTESTINAL ENDOSCOPY N/A 2/21/2019    EGD DILATION BALLOON performed by Nehemias Enamorado MD at [facility] N/A 2/21/2019    EGD BIOPSY performed by Nehemias Enamorado MD at [facility] Medications Prior to Admission:      Prior to Admission medications    Medication Sig Start Date End Date Taking? Authorizing Provider   posaconazole (NOXAFIL) 100 MG TBEC tablet Take 2 tablets by mouth daily (with breakfast) 12/19/19 3/18/20 Yes Jasbir Ly MD   tiotropium (SPIRIVA RESPIMAT) 2.5 MCG/ACT AERS inhaler USE 2 PUFFS DAILY 8/23/19  Yes Jemma Bruk MD   fluticasone-salmeterol (ADVAIR HFA) 230-21 MCG/ACT inhaler USE 2 PUFFS TWO TIMES DAILY 8/23/19  Yes Jemma Burk MD   omeprazole (PRILOSEC) 20 MG delayed release capsule TAKE 1 CAPSULE BY MOUTH  DAILY 8/21/19  Yes Eldon Harris MD   albuterol sulfate HFA (PROAIR HFA) 108 (90 Base) MCG/ACT inhaler USE 2 PUFFS EVERY 6 HOURS  AS NEEDED FOR WHEEZING 6/5/19  Yes Jemma Burk MD   benztropine (COGENTIN) 1 MG tablet Take 1 mg by mouth daily  3/18/18  Yes Historical Provider, MD   paliperidone (INVEGA) 6 MG ER tablet Take 6 mg by mouth every morning. Yes Historical Provider, MD       Allergies:  Pcn [penicillins]    Social History:      The patient currently lives home with wife    TOBACCO:   reports that he quit smoking about 7 years ago. His smoking use included cigarettes. He has a 70.00 pack-year smoking history. He has never used smokeless tobacco.  ETOH:   reports previous alcohol use. Family History:       Reviewed in detail and negative for DM, CAD, Cancer, CVA. Positive as follows:        Problem Relation Age of Onset    Arthritis Mother     Glaucoma Mother     High Blood Pressure Mother     Heart Disease Father         age [de-identified]    COPD Father         emphysema    COPD Brother        REVIEW OF SYSTEMS:   Pertinent positives as noted in the HPI. All other systems reviewed and negative.     PHYSICAL EXAM PERFORMED:    /63   Pulse 109   Temp 98.8 °F (37.1 °C) (Oral)   Resp 19   Wt 90 lb (40.8 kg)   SpO2 91%   BMI 15.45 kg/m²     General appearance:  No apparent distress, appears stated age and cooperative. HEENT:  Normal cephalic, atraumatic without obvious deformity. Pupils equal, round, and reactive to light. Extra ocular muscles intact. Conjunctivae/corneas clear. Oral mucous membranes moist  Neck: Supple, with full range of motion. No jugular venous distention. Trachea midline. Respiratory:  Normal respiratory effort. Diminished lung sounds in both posteriors  without Rales/Wheezes/Rhonchi. Cardiovascular:  Regular rate and rhythm with normal S1/S2 without murmurs, rubs or gallops. Abdomen: Soft, non-tender, non-distended with normal bowel sounds. No hepatopslenomegaly  Musculoskeletal:  Full range of motion without deformity. Skin: Skin color, texture, turgor normal.  No rashes or lesions. Neurologic:  Neurovascularly intact without any focal sensory/motor deficits. Cranial nerves: II-XII intact, grossly non-focal.  Psychiatric:  Alert and orientedx3, thought content appropriate, normal insight  Capillary Refill: Brisk,< 3 seconds   Extremities: Peripheral Pulses +2 palpable, equal bilaterally,  No clubbing, cyanosis or edema bilaterally    Labs:     Recent Labs     01/09/20  0816   WBC 21.1*   HGB 15.4   HCT 45.3        Recent Labs     01/09/20  0816   *   K 4.0   CL 99   CO2 23   BUN 11   CREATININE 0.7*   CALCIUM 9.3     Recent Labs     01/09/20  0816   AST 22   ALT 20   BILITOT 0.5   ALKPHOS 91       Recent Labs     01/09/20  0816   TROPONINI <0.01       Urinalysis:      Lab Results   Component Value Date    NITRU Negative 08/02/2015    WBCUA 4 08/02/2015    RBCUA 3 08/02/2015    BLOODU Negative 08/02/2015    SPECGRAV 1.024 08/02/2015    GLUCOSEU Negative 08/02/2015       Radiology:         XR CHEST STANDARD (2 VW)   Preliminary Result   1. Left lower lobe pneumonia, superimposed on COPD. 2. Stable right upper lobe noncalcified nodule. Please refer to chest CT of   December 16, 2019 for further comment and follow-up recommendations.              ASSESSMENT:    [address] Problems    Diagnosis Date Noted    CAP (community acquired pneumonia) due to Chlamydia species [J16.0] 01/09/2020   Leukocytosis   history of schizophrenia  History of aspergillosis  Former smoker    PLAN:    1. We will admit this patient to [facility] floor, start IV Levaquin, consult pulmonology, check respiratory pathogen panel. Patient received flu shot and pneumonia shot earlier this season around October 2019  2. Leukocytosis due to pneumonia, will monitor CBC  3. Continue Invega, patient has been on this since 2010  4. Continue Cogentin, patient has tardive dyskinesia  5. Has been scheduled to undergo another CT scan of chest on March 2020,  continue posaconazole by that time  6. Patient quit smoking in 2013    DVT Prophylaxis: Enoxaparin  Diet: Regular diet  Code Status: Full code           Shashank Reveles MD    Thank you Leopoldo Bo, MD for the opportunity to be involved in this patient's care. If you have any questions or concerns please feel free to contact me at [phone].

## 2020-01-09 NOTE — ED NOTES
Pharmacy Medication History Note      List of current medications patient is taking is complete. Source of information: Family    Changes made to medication list:  Medications flagged for removal (include reason, ex. noncompliance):  N/A    Medications removed (include reason, ex. therapy complete or physician discontinued):  N/A    Medications added/doses adjusted:  N/A    Other notes (ex. Recent course of antibiotics, Coumadin dosing):  Denies use of other OTC or herbal medications. Last dose times updated. [facility]    No current facility-administered medications on file prior to encounter. Current Outpatient Medications on File Prior to Encounter   Medication Sig Dispense Refill    posaconazole (NOXAFIL) 100 MG TBEC tablet Take 2 tablets by mouth daily (with breakfast) 60 tablet 2    tiotropium (SPIRIVA RESPIMAT) 2.5 MCG/ACT AERS inhaler USE 2 PUFFS DAILY 3 g 1    fluticasone-salmeterol (ADVAIR HFA) 230-21 MCG/ACT inhaler USE 2 PUFFS TWO TIMES DAILY 3 Inhaler 1    omeprazole (PRILOSEC) 20 MG delayed release capsule TAKE 1 CAPSULE BY MOUTH  DAILY 90 capsule 2    albuterol sulfate HFA (PROAIR HFA) 108 (90 Base) MCG/ACT inhaler USE 2 PUFFS EVERY 6 HOURS  AS NEEDED FOR WHEEZING 1 Inhaler 6    benztropine (COGENTIN) 1 MG tablet Take 1 mg by mouth daily       paliperidone (INVEGA) 6 MG ER tablet Take 6 mg by mouth every morning.         [DISCONTINUED] Posaconazole (NOXAFIL PO) Take by mouth nightly

## 2020-01-09 NOTE — ED NOTES
Pt transported to floor by floor nurse. Pt stable at transfer. Belongings sent with pt. Family present and aware. Transfer complete.        Dionicio Hunter RN  01/09/20 [id number]

## 2020-01-09 NOTE — CONSULTS
[facility] Pulmonary and Critical Care   Consult Note      Reason for Consult: SOB, Pneumonia  Requesting Physician: Dr Navarro Saliva:   [address] [facility]:                The patient is a 75 y.o. male with significant past medical history of:      Diagnosis Date    Arthritis     Aspergillus ([facility] 05/18/2018    resp culture    Bipolar 1 disorder ([facility]     [facility], [facility]     COPD [facility] disease) [facility]     GERD (gastroesophageal reflux disease)     Tourette's      The patient presents with a chief complaint of increasingly severe SOB over the last two weeks. He has had viral symptoms. HE has had associated cough. He is known to have severe COPD/emphysema of many years duration. He has a h/o Aspergillosis in the past. H e had bronch in the fall that was negative.       Past Surgical History:        Procedure Laterality Date    BRONCHOSCOPY N/A 05/18/2018    BRONCHOSCOPY N/A 11/6/2019    BRONCHOSCOPY WITH LAVAGE performed by Desmond Oleary MD at [address], COLON, DIAGNOSTIC      KNEE ARTHROSCOPY      bilateral    LUNG BIOPSY  12/2018    TONSILLECTOMY      UPPER GASTROINTESTINAL ENDOSCOPY  9/25/15    UPPER GASTROINTESTINAL ENDOSCOPY N/A 2/21/2019    EGD DILATION BALLOON performed by Diane Cortez MD at [place] [address] 2/21/2019    EGD BIOPSY performed by Diane Cortez MD at [facility]       Current Medications:    Current Facility-Administered Medications: albuterol sulfate  (90 Base) MCG/ACT inhaler 1 puff, 1 puff, Inhalation, Q6H PRN  [START ON 1/10/2020] posaconazole (NOXAFIL) tablet 200 mg, 200 mg, Oral, Daily with breakfast  tiotropium (SPIRIVA RESPIMAT) 2.5 MCG/ACT inhaler 2 puff, 2 puff, Inhalation, Daily  benztropine (COGENTIN) tablet 1 mg, 1 mg, Oral, Daily  mometasone-formoterol (DULERA) 100-5 MCG/ACT inhaler 2 puff, 2 puff, Inhalation,    MCV 91.1   MCH 31.1   MCHC 34.1   RDW 14.5   BANDSPCT 15*      BMP:  Recent Labs     01/09/20  0816   *   K 4.0   CL 99   CO2 23   BUN 11   CREATININE 0.7*   CALCIUM 9.3   GLUCOSE 115*      ABG:  No results for input(s): PHART, RSO0SZU, PO2ART, WKY6KXK, F3EDKLJZ, BEART in the last 72 hours. No results found for: BNP  Lab Results   Component Value Date    TROPONINI <0.01 01/09/2020       Cultures:     Abx:    Radiology Review:  All pertinent images / reports were reviewed as a part of this visit. Assessment:     1. LLL CAP  · I have independently reviewed radiographic images for this patient as part of this visit. · CXR does show LLL infiltrate without effusion  · WBC was 21 k  · No procalcitonin is collected  · Flu screen is negative  · Viral panel is pending  · He is on Levaquin which is appropriate  · He is starting to feel better    2. Severe COPD/Emphysema  · Prior CT imaging documents severe emphysema  · FEV1 is is reduced at 71% predicted  · Normally on Spiriva at home  · On scheduled bornchodilators Dulera and Spiriva here  · I think we could stop the [facility]    3.  H/O Aspergillosis  · No evidence of this on recent bronch  · Aspergillus Galactomannan is negative 9/19

## 2020-01-10 LAB
ANION GAP SERPL CALCULATED.3IONS-SCNC: 13 MMOL/L (ref 3–16)
BUN BLDV-MCNC: 14 MG/DL (ref 7–20)
CALCIUM SERPL-MCNC: 9.1 MG/DL (ref 8.3–10.6)
CHLORIDE BLD-SCNC: 108 MMOL/L (ref 99–110)
CO2: 19 MMOL/L (ref 21–32)
CREAT SERPL-MCNC: <0.5 MG/DL (ref 0.8–1.3)
GFR AFRICAN AMERICAN: >60
GFR NON-AFRICAN AMERICAN: >60
GLUCOSE BLD-MCNC: 162 MG/DL (ref 70–99)
HCT VFR BLD CALC: 41.2 % (ref 40.5–52.5)
HEMOGLOBIN: 14.2 G/DL (ref 13.5–17.5)
L. PNEUMOPHILA SEROGP 1 UR AG: NORMAL
MAGNESIUM: 2.1 MG/DL (ref 1.8–2.4)
MCH RBC QN AUTO: 31.1 PG (ref 26–34)
MCHC RBC AUTO-ENTMCNC: 34.6 G/DL (ref 31–36)
MCV RBC AUTO: 89.8 FL (ref 80–100)
PDW BLD-RTO: 14.3 % (ref 12.4–15.4)
PHOSPHORUS: 3.4 MG/DL (ref 2.5–4.9)
PLATELET # BLD: 276 K/UL (ref 135–450)
PMV BLD AUTO: 7.2 FL (ref 5–10.5)
POTASSIUM SERPL-SCNC: 3.8 MMOL/L (ref 3.5–5.1)
RBC # BLD: 4.59 M/UL (ref 4.2–5.9)
SODIUM BLD-SCNC: 140 MMOL/L (ref 136–145)
WBC # BLD: 20.2 K/UL (ref 4–11)

## 2020-01-10 PROCEDURE — 2580000003 HC RX 258: Performed by: HOSPITALIST

## 2020-01-10 PROCEDURE — G0378 HOSPITAL OBSERVATION PER HR: HCPCS

## 2020-01-10 PROCEDURE — 99232 SBSQ HOSP IP/OBS MODERATE 35: CPT | Performed by: INTERNAL MEDICINE

## 2020-01-10 PROCEDURE — 1200000000 HC SEMI PRIVATE

## 2020-01-10 PROCEDURE — 85027 COMPLETE CBC AUTOMATED: CPT

## 2020-01-10 PROCEDURE — 94761 N-INVAS EAR/PLS OXIMETRY MLT: CPT

## 2020-01-10 PROCEDURE — 6360000002 HC RX W HCPCS: Performed by: HOSPITALIST

## 2020-01-10 PROCEDURE — 6370000000 HC RX 637 (ALT 250 FOR IP): Performed by: HOSPITALIST

## 2020-01-10 PROCEDURE — 36415 COLL VENOUS BLD VENIPUNCTURE: CPT

## 2020-01-10 PROCEDURE — 83735 ASSAY OF MAGNESIUM: CPT

## 2020-01-10 PROCEDURE — 80048 BASIC METABOLIC PNL TOTAL CA: CPT

## 2020-01-10 PROCEDURE — 84100 ASSAY OF PHOSPHORUS: CPT

## 2020-01-10 PROCEDURE — 96366 THER/PROPH/DIAG IV INF ADDON: CPT

## 2020-01-10 PROCEDURE — 94640 AIRWAY INHALATION TREATMENT: CPT

## 2020-01-10 RX ADMIN — TIOTROPIUM BROMIDE INHALATION SPRAY 2 PUFF: 3.12 SPRAY, METERED RESPIRATORY (INHALATION) at 09:00

## 2020-01-10 RX ADMIN — Medication 2 PUFF: at 09:00

## 2020-01-10 RX ADMIN — LEVOFLOXACIN 750 MG: 5 INJECTION, SOLUTION INTRAVENOUS at 12:52

## 2020-01-10 RX ADMIN — Medication 10 ML: at 08:20

## 2020-01-10 RX ADMIN — BENZTROPINE MESYLATE 1 MG: 1 TABLET ORAL at 08:18

## 2020-01-10 RX ADMIN — PANTOPRAZOLE SODIUM 40 MG: 40 TABLET, DELAYED RELEASE ORAL at 06:53

## 2020-01-10 RX ADMIN — PALIPERIDONE 6 MG: 3 TABLET, EXTENDED RELEASE ORAL at 08:23

## 2020-01-10 RX ADMIN — POSACONAZOLE 200 MG: 100 TABLET, COATED ORAL at 17:13

## 2020-01-10 RX ADMIN — Medication 10 ML: at 21:29

## 2020-01-10 RX ADMIN — Medication 2 PUFF: at 21:28

## 2020-01-10 ASSESSMENT — PAIN SCALES - GENERAL
PAINLEVEL_OUTOF10: 0
PAINLEVEL_OUTOF10: 0

## 2020-01-10 NOTE — PROGRESS NOTES
P Pulmonary and Critical Care  Progress note      Reason for Consult: SOB, Pneumonia  Requesting Physician: Dr Desai [facility]:   [address] [facility]:                The patient is a 75 y.o. male with significant past medical history of:      Diagnosis Date    Arthritis     [facility]pergillus ([facility] 05/18/2018    resp culture    Bipolar 1 disorder ([facility]     [facility], [facility]     COPD (chronic obstructive pulmonary disease) [facility]     GERD (gastroesophageal reflux disease)     Tourette's      Interval history: Patient does feel better. Still having cough. Really has not tested himself with exertion yet.       Past Surgical History:        Procedure Laterality Date    BRONCHOSCOPY N/A 05/18/2018    BRONCHOSCOPY N/A 11/6/2019    BRONCHOSCOPY WITH LAVAGE performed by Mary Mason MD at [address], COLON, DIAGNOSTIC      KNEE ARTHROSCOPY      bilateral    LUNG BIOPSY  12/2018    TONSILLECTOMY      UPPER GASTROINTESTINAL ENDOSCOPY  9/25/15    UPPER GASTROINTESTINAL ENDOSCOPY N/A 2/21/2019    EGD DILATION BALLOON performed by Chacorta Felipe MD at [address] [place] [address] N/A 2/21/2019    EGD BIOPSY performed by Chacorta Felipe MD at [facility]       Current Medications:    Current Facility-Administered Medications: albuterol sulfate  (90 Base) MCG/ACT inhaler 1 puff, 1 puff, Inhalation, Q6H PRN  tiotropium (SPIRIVA RESPIMAT) 2.5 MCG/ACT inhaler 2 puff, 2 puff, Inhalation, Daily  benztropine (COGENTIN) tablet 1 mg, 1 mg, Oral, Daily  mometasone-formoterol (DULERA) 100-5 MCG/ACT inhaler 2 puff, 2 puff, Inhalation, BID  pantoprazole (PROTONIX) tablet 40 mg, 40 mg, Oral, QAM AC  paliperidone (INVEGA) extended release tablet 6 mg, 6 mg, Oral, QAM  sodium chloride flush 0.9 % injection 10 mL, 10 mL, Intravenous, 2 times per day  sodium chloride flush 0.9 % injection 10 mL, 10 mL, Intravenous, PRN  magnesium hydroxide (MILK OF MAGNESIA) 400 MG/5ML suspension 30 mL, 30 mL, Oral, Daily PRN  ondansetron (ZOFRAN) injection 4 mg, 4 mg, Intravenous, Q6H PRN  enoxaparin (LOVENOX) injection 40 mg, 40 mg, Subcutaneous, Daily  levofloxacin (LEVAQUIN) 750 MG/150ML infusion 750 mg, 750 mg, Intravenous, Q24H  posaconazole (NOXAFIL) tablet 200 mg, 200 mg, Oral, Dinner    Allergies   Allergen Reactions    Pcn [Penicillins] Hives     Unknown reaction       Social History:    TOBACCO:   reports that he quit smoking about 7 years ago. His smoking use included cigarettes. He has a 70.00 pack-year smoking history. He has never used smokeless tobacco.  ETOH:   reports previous alcohol use. Patient currently lives independently  Environmental/chemical exposure: none known     Family History:       Problem Relation Age of Onset    Arthritis Mother     Glaucoma Mother     High Blood Pressure Mother     Heart Disease Father         age [de-identified]    COPD Father         emphysema    COPD Brother      REVIEW OF SYSTEMS:    CONSTITUTIONAL:  negative for fevers, chills, diaphoresis, activity change, appetite change, fatigue, night sweats and unexpected weight change.    EYES:  negative for blurred vision, eye discharge, visual disturbance and icterus  HEENT:  negative for hearing loss, tinnitus, ear drainage, sinus pressure, nasal congestion, epistaxis and snoring  RESPIRATORY:  See HPI  CARDIOVASCULAR:  negative for chest pain, palpitations, exertional chest pressure/discomfort, edema, syncope  GASTROINTESTINAL:  negative for nausea, vomiting, diarrhea, constipation, blood in stool and abdominal pain  GENITOURINARY:  negative for frequency, dysuria, urinary incontinence, decreased urine volume, and hematuria  HEMATOLOGIC/LYMPHATIC:  negative for easy bruising, bleeding and lymphadenopathy  ALLERGIC/IMMUNOLOGIC:  negative for recurrent infections, angioedema, anaphylaxis and drug reactions  ENDOCRINE:  negative for weight changes and diabetic symptoms including polyuria, polydipsia and polyphagia  MUSCULOSKELETAL:  negative for  pain, joint swelling, decreased range of motion and muscle weakness  NEUROLOGICAL:  negative for headaches, slurred speech, unilateral weakness  PSYCHIATRIC/BEHAVIORAL: negative for hallucinations, behavioral problems, confusion and agitation. Objective:   PHYSICAL EXAM:      VITALS:  /69   Pulse 74   Temp 98 °F (36.7 °C) (Oral)   Resp 18   Ht 5' 4\" (1.626 m)   Wt 84 lb 6.4 oz (38.3 kg)   SpO2 91%   BMI 14.49 kg/m²      24HR INTAKE/OUTPUT:      Intake/Output Summary (Last 24 hours) at 1/10/2020 1043  Last data filed at 1/9/2020 1258  Gross per 24 hour   Intake 120 ml   Output --   Net 120 ml     CONSTITUTIONAL:  awake, alert, cooperative, no apparent distress, and appears stated age  NECK:  Supple, symmetrical, trachea midline, no adenopathy, thyroid symmetric, not enlarged and no tenderness, skin normal  LUNGS:  no increased work of breathing and clear to auscultation. No accessory muscle use  CARDIOVASCULAR: S1 and S2, no edema and no JVD  ABDOMEN:  normal bowel sounds, non-distended and no masses palpated, and no tenderness to palpation. No hepatospleenomegaly  LYMPHADENOPATHY:  no axillary or supraclavicular adenopathy. No cervical adnenopathy  PSYCHIATRIC: Oriented to person place and time. No obvious depression or anxiety. MUSCULOSKELETAL: No obvious misalignment or effusion of the joints. No clubbing, cyanosis of the digits. SKIN:  normal skin color, texture, turgor and no redness, warmth, or swelling.  No palpable nodules    DATA:    Old records have been reviewed    CBC:  Recent Labs     01/09/20  0816 01/10/20  0545   WBC 21.1* 20.2*   RBC 4.97 4.59   HGB 15.4 14.2   HCT 45.3 41.2    276   MCV 91.1 89.8   MCH 31.1 31.1   MCHC 34.1 34.6   RDW 14.5 14.3   BANDSPCT 15*  --       BMP:  Recent Labs     01/09/20  0816 01/10/20  0544   * 140   K 4.0 3.8   CL 99 108   CO2 23 19*   BUN 11 14   CREATININE 0.7* <0.5*   CALCIUM 9.3 9.1   GLUCOSE 115* 162*      ABG:  No results for input(s): PHART, JWY8BCN, PO2ART, NYA8SNM, B9TDNQWU, BEART in the last 72 hours. No results found for: BNP  Lab Results   Component Value Date    TROPONINI <0.01 01/09/2020       Cultures:     Abx:    Radiology Review:  All pertinent images / reports were reviewed as a part of this visit. Assessment:     1. LLL CAP  · Flu screen is negative  · Legionella urinary antigen is negative  · Blood cultures no growth so far  · Respiratory molecular panel is pending  · Levaquin 750 mg IV piggyback every 24 hours    2. Severe COPD/Emphysema  · Dulera  · Spiriva  · Albuterol as needed    3.  H/O Aspergillosis  · No evidence of this on recent bronch  · Aspergillus Galactomannan is negative 9/19

## 2020-01-11 ENCOUNTER — APPOINTMENT (OUTPATIENT)
Dept: GENERAL RADIOLOGY | Age: 65
DRG: 193 | End: 2020-01-11
Payer: COMMERCIAL

## 2020-01-11 LAB
ANION GAP SERPL CALCULATED.3IONS-SCNC: 12 MMOL/L (ref 3–16)
BUN BLDV-MCNC: 19 MG/DL (ref 7–20)
CALCIUM SERPL-MCNC: 8.9 MG/DL (ref 8.3–10.6)
CHLORIDE BLD-SCNC: 110 MMOL/L (ref 99–110)
CO2: 20 MMOL/L (ref 21–32)
CREAT SERPL-MCNC: 0.5 MG/DL (ref 0.8–1.3)
GFR AFRICAN AMERICAN: >60
GFR NON-AFRICAN AMERICAN: >60
GLUCOSE BLD-MCNC: 116 MG/DL (ref 70–99)
HCT VFR BLD CALC: 42 % (ref 40.5–52.5)
HEMOGLOBIN: 14.2 G/DL (ref 13.5–17.5)
MCH RBC QN AUTO: 31 PG (ref 26–34)
MCHC RBC AUTO-ENTMCNC: 33.7 G/DL (ref 31–36)
MCV RBC AUTO: 92 FL (ref 80–100)
PDW BLD-RTO: 14.6 % (ref 12.4–15.4)
PLATELET # BLD: 275 K/UL (ref 135–450)
PMV BLD AUTO: 7.8 FL (ref 5–10.5)
POTASSIUM SERPL-SCNC: 3.6 MMOL/L (ref 3.5–5.1)
RBC # BLD: 4.57 M/UL (ref 4.2–5.9)
SODIUM BLD-SCNC: 142 MMOL/L (ref 136–145)
WBC # BLD: 24 K/UL (ref 4–11)

## 2020-01-11 PROCEDURE — 6370000000 HC RX 637 (ALT 250 FOR IP): Performed by: NURSE PRACTITIONER

## 2020-01-11 PROCEDURE — 6370000000 HC RX 637 (ALT 250 FOR IP): Performed by: HOSPITALIST

## 2020-01-11 PROCEDURE — 92526 ORAL FUNCTION THERAPY: CPT

## 2020-01-11 PROCEDURE — 96366 THER/PROPH/DIAG IV INF ADDON: CPT

## 2020-01-11 PROCEDURE — 85027 COMPLETE CBC AUTOMATED: CPT

## 2020-01-11 PROCEDURE — 92610 EVALUATE SWALLOWING FUNCTION: CPT

## 2020-01-11 PROCEDURE — 36415 COLL VENOUS BLD VENIPUNCTURE: CPT

## 2020-01-11 PROCEDURE — 94640 AIRWAY INHALATION TREATMENT: CPT

## 2020-01-11 PROCEDURE — 2580000003 HC RX 258: Performed by: HOSPITALIST

## 2020-01-11 PROCEDURE — G0378 HOSPITAL OBSERVATION PER HR: HCPCS

## 2020-01-11 PROCEDURE — 6360000002 HC RX W HCPCS: Performed by: HOSPITALIST

## 2020-01-11 PROCEDURE — 0100U HC RESPIRPTHGN MULT REV TRANS & AMP PRB TECH 21 TRGT: CPT

## 2020-01-11 PROCEDURE — 71046 X-RAY EXAM CHEST 2 VIEWS: CPT

## 2020-01-11 PROCEDURE — 80048 BASIC METABOLIC PNL TOTAL CA: CPT

## 2020-01-11 PROCEDURE — 1200000000 HC SEMI PRIVATE

## 2020-01-11 PROCEDURE — 94761 N-INVAS EAR/PLS OXIMETRY MLT: CPT

## 2020-01-11 RX ORDER — ACETAMINOPHEN 325 MG/1
650 TABLET ORAL EVERY 4 HOURS PRN
Status: DISCONTINUED | OUTPATIENT
Start: 2020-01-11 | End: 2020-01-15 | Stop reason: HOSPADM

## 2020-01-11 RX ORDER — GUAIFENESIN 600 MG/1
600 TABLET, EXTENDED RELEASE ORAL 2 TIMES DAILY
Status: DISCONTINUED | OUTPATIENT
Start: 2020-01-11 | End: 2020-01-15 | Stop reason: HOSPADM

## 2020-01-11 RX ADMIN — Medication 2 PUFF: at 09:35

## 2020-01-11 RX ADMIN — BENZTROPINE MESYLATE 1 MG: 1 TABLET ORAL at 08:59

## 2020-01-11 RX ADMIN — Medication 2 PUFF: at 21:20

## 2020-01-11 RX ADMIN — Medication 10 ML: at 09:00

## 2020-01-11 RX ADMIN — PALIPERIDONE 6 MG: 3 TABLET, EXTENDED RELEASE ORAL at 08:59

## 2020-01-11 RX ADMIN — PANTOPRAZOLE SODIUM 40 MG: 40 TABLET, DELAYED RELEASE ORAL at 08:59

## 2020-01-11 RX ADMIN — ACETAMINOPHEN 650 MG: 325 TABLET, FILM COATED ORAL at 22:05

## 2020-01-11 RX ADMIN — LEVOFLOXACIN 750 MG: 5 INJECTION, SOLUTION INTRAVENOUS at 12:47

## 2020-01-11 RX ADMIN — Medication 10 ML: at 22:05

## 2020-01-11 RX ADMIN — GUAIFENESIN 600 MG: 600 TABLET, EXTENDED RELEASE ORAL at 16:49

## 2020-01-11 RX ADMIN — TIOTROPIUM BROMIDE INHALATION SPRAY 2 PUFF: 3.12 SPRAY, METERED RESPIRATORY (INHALATION) at 09:35

## 2020-01-11 RX ADMIN — POSACONAZOLE 200 MG: 100 TABLET, COATED ORAL at 18:05

## 2020-01-11 ASSESSMENT — PAIN SCALES - GENERAL
PAINLEVEL_OUTOF10: 6
PAINLEVEL_OUTOF10: 0

## 2020-01-11 NOTE — PROGRESS NOTES
CLINICAL BEDSIDE SWALLOWING EVALUATION  Speech Therapy Department    Patient Name:  Bianka Prado  :  1955  Pain level: \"pain from coughing and the pneumonia\"  Medical Diagnosis:   CAP (community acquired pneumonia) due to Chlamydia species [J16.0]  CAP (community acquired pneumonia) due to Chlamydia species [J16.0]   Onset : 2020      Chart REview:   · MD History and Physical   History Of Present Illness:     67 y.o. male who has a history of COPD, aspergillosis, bipolar affective disorder, schizophrenia presented to [facility] with 2 weeks history of cough and shortness of breath. The patient is not producing much sputum. This will start with runny nose, runny eyes and dry cough about 2 weeks ago, no sick contacts around him. He has history of COPD and followed by Dr. Rebel Sullivan, patient also has a history of aspergillosis and he is currently on posaconazole. In the emergency room work-up showed WBC 21.1 sodium 134 creatinine 0.7, glucose 115 chest x-ray showed left lower lobe infiltrates suggesting pneumonia. Hospitalist service called for further management of this patient. · Has Chest X-ray ordered: await results  · Most recent Chest 2020  Impression   1. Left lower lobe pneumonia, superimposed on COPD. 2. Stable right upper lobe noncalcified nodule.  Please refer to chest CT of   2019 for further comment and follow-up recommendations. ·  has a past medical history of Arthritis, Aspergillus ([address] (2018), Bipolar 1 disorder ([address] Chronic bronchitis, obstructive ([address] COPD (chronic obstructive pulmonary disease) ([address] GERD (gastroesophageal reflux disease), and Tourette's. · Pt reports history of chronic weight loss  · History of Dysphagia Therapy in 2019 (regular diet at time of discharge)  · History of MBSS Feb of 2019      Treatment Diagnosis:  Dysphagia    Clinical Evaluation of Swallowing Impressions:  1. Pt was awake in bed.  Spouse was at

## 2020-01-11 NOTE — PROGRESS NOTES
dyskinesia  4.  Continue posaconazole by March 2020       Diet: DIET GENERAL;  Code:Full Code  DVT PPX leandro Andrade MD   1/11/2020 11:20 AM

## 2020-01-11 NOTE — PLAN OF CARE
Problem: Falls - Risk of:  Goal: Will remain free from falls  Description  Will remain free from falls  Outcome: Ongoing   Will remain free of falls this shift, pt refusing bed alarm, wife at bedside, call light within reach and will call when needs arise. Pt has remained on RA this shift, 90-92% O2 sat. Denies worsening SOB and chest pain, reports muscle soreness in chest r/t coughing.

## 2020-01-12 LAB
ANION GAP SERPL CALCULATED.3IONS-SCNC: 13 MMOL/L (ref 3–16)
BUN BLDV-MCNC: 12 MG/DL (ref 7–20)
CALCIUM SERPL-MCNC: 8.7 MG/DL (ref 8.3–10.6)
CHLORIDE BLD-SCNC: 106 MMOL/L (ref 99–110)
CO2: 22 MMOL/L (ref 21–32)
CREAT SERPL-MCNC: 0.6 MG/DL (ref 0.8–1.3)
GFR AFRICAN AMERICAN: >60
GFR NON-AFRICAN AMERICAN: >60
GLUCOSE BLD-MCNC: 95 MG/DL (ref 70–99)
HCT VFR BLD CALC: 45 % (ref 40.5–52.5)
HEMOGLOBIN: 15.2 G/DL (ref 13.5–17.5)
MCH RBC QN AUTO: 30.4 PG (ref 26–34)
MCHC RBC AUTO-ENTMCNC: 33.9 G/DL (ref 31–36)
MCV RBC AUTO: 89.8 FL (ref 80–100)
PDW BLD-RTO: 14.7 % (ref 12.4–15.4)
PLATELET # BLD: 273 K/UL (ref 135–450)
PMV BLD AUTO: 7.4 FL (ref 5–10.5)
POTASSIUM SERPL-SCNC: 3.7 MMOL/L (ref 3.5–5.1)
RBC # BLD: 5.01 M/UL (ref 4.2–5.9)
REPORT: NORMAL
RESPIRATORY PANEL PCR: NORMAL
SODIUM BLD-SCNC: 141 MMOL/L (ref 136–145)
WBC # BLD: 18.7 K/UL (ref 4–11)

## 2020-01-12 PROCEDURE — 36415 COLL VENOUS BLD VENIPUNCTURE: CPT

## 2020-01-12 PROCEDURE — 80048 BASIC METABOLIC PNL TOTAL CA: CPT

## 2020-01-12 PROCEDURE — G0378 HOSPITAL OBSERVATION PER HR: HCPCS

## 2020-01-12 PROCEDURE — 6370000000 HC RX 637 (ALT 250 FOR IP): Performed by: NURSE PRACTITIONER

## 2020-01-12 PROCEDURE — 94669 MECHANICAL CHEST WALL OSCILL: CPT

## 2020-01-12 PROCEDURE — 94640 AIRWAY INHALATION TREATMENT: CPT

## 2020-01-12 PROCEDURE — 94761 N-INVAS EAR/PLS OXIMETRY MLT: CPT

## 2020-01-12 PROCEDURE — 6360000002 HC RX W HCPCS: Performed by: HOSPITALIST

## 2020-01-12 PROCEDURE — 2580000003 HC RX 258: Performed by: HOSPITALIST

## 2020-01-12 PROCEDURE — 99232 SBSQ HOSP IP/OBS MODERATE 35: CPT | Performed by: INTERNAL MEDICINE

## 2020-01-12 PROCEDURE — 1200000000 HC SEMI PRIVATE

## 2020-01-12 PROCEDURE — 2700000000 HC OXYGEN THERAPY PER DAY

## 2020-01-12 PROCEDURE — 96366 THER/PROPH/DIAG IV INF ADDON: CPT

## 2020-01-12 PROCEDURE — 6370000000 HC RX 637 (ALT 250 FOR IP): Performed by: HOSPITALIST

## 2020-01-12 PROCEDURE — 85027 COMPLETE CBC AUTOMATED: CPT

## 2020-01-12 RX ADMIN — PANTOPRAZOLE SODIUM 40 MG: 40 TABLET, DELAYED RELEASE ORAL at 06:10

## 2020-01-12 RX ADMIN — POSACONAZOLE 200 MG: 100 TABLET, COATED ORAL at 17:11

## 2020-01-12 RX ADMIN — LEVOFLOXACIN 750 MG: 5 INJECTION, SOLUTION INTRAVENOUS at 11:54

## 2020-01-12 RX ADMIN — GUAIFENESIN 600 MG: 600 TABLET, EXTENDED RELEASE ORAL at 20:48

## 2020-01-12 RX ADMIN — PALIPERIDONE 6 MG: 3 TABLET, EXTENDED RELEASE ORAL at 08:38

## 2020-01-12 RX ADMIN — Medication 2 PUFF: at 21:40

## 2020-01-12 RX ADMIN — TIOTROPIUM BROMIDE INHALATION SPRAY 2 PUFF: 3.12 SPRAY, METERED RESPIRATORY (INHALATION) at 08:49

## 2020-01-12 RX ADMIN — Medication 10 ML: at 20:48

## 2020-01-12 RX ADMIN — ACETAMINOPHEN 650 MG: 325 TABLET, FILM COATED ORAL at 07:20

## 2020-01-12 RX ADMIN — Medication 2 PUFF: at 08:48

## 2020-01-12 RX ADMIN — GUAIFENESIN 600 MG: 600 TABLET, EXTENDED RELEASE ORAL at 08:37

## 2020-01-12 RX ADMIN — Medication 10 ML: at 08:38

## 2020-01-12 RX ADMIN — ACETAMINOPHEN 650 MG: 325 TABLET, FILM COATED ORAL at 20:48

## 2020-01-12 RX ADMIN — BENZTROPINE MESYLATE 1 MG: 1 TABLET ORAL at 08:37

## 2020-01-12 ASSESSMENT — PAIN DESCRIPTION - PAIN TYPE: TYPE: ACUTE PAIN

## 2020-01-12 ASSESSMENT — PAIN SCALES - GENERAL
PAINLEVEL_OUTOF10: 0
PAINLEVEL_OUTOF10: 6
PAINLEVEL_OUTOF10: 6

## 2020-01-12 ASSESSMENT — PAIN DESCRIPTION - LOCATION: LOCATION: HEAD

## 2020-01-12 NOTE — PROGRESS NOTES
[facility] PROGRESS NOTE    1/12/2020 12:01 PM        Name: Zion Garza . Admitted: 1/9/2020  Primary Care Provider: [facility], MD (Tel: [phone])                        Hospital course:  75 y. o. male who has a history of COPD, aspergillosis, bipolar affective disorder, schizophrenia presented to [facility] with 2 weeks history of cough and shortness of breath.  The patient is not producing much sputum.  This will start with runny nose, runny eyes and dry cough about 2 weeks ago, no sick contacts around him.  X-ray showed left lower lobe pneumonia, patient started Levaquin, pulmonology service consulted.       Subjective: The patient feels much better today    No acute events overnight. Resting well. Pain control. Diet ok. Labs reviewed  Denies any chest pain sob.      Reviewed interval ancillary notes    Current Medications  guaiFENesin (MUCINEX) extended release tablet 600 mg, BID  acetaminophen (TYLENOL) tablet 650 mg, Q4H PRN  enoxaparin (LOVENOX) injection 30 mg, Daily  albuterol sulfate  (90 Base) MCG/ACT inhaler 1 puff, Q6H PRN  tiotropium (SPIRIVA RESPIMAT) 2.5 MCG/ACT inhaler 2 puff, Daily  benztropine (COGENTIN) tablet 1 mg, Daily  mometasone-formoterol (DULERA) 100-5 MCG/ACT inhaler 2 puff, BID  pantoprazole (PROTONIX) tablet 40 mg, QAM AC  paliperidone (INVEGA) extended release tablet 6 mg, QAM  sodium chloride flush 0.9 % injection 10 mL, 2 times per day  sodium chloride flush 0.9 % injection 10 mL, PRN  magnesium hydroxide (MILK OF MAGNESIA) 400 MG/5ML suspension 30 mL, Daily PRN  ondansetron (ZOFRAN) injection 4 mg, Q6H PRN  levofloxacin (LEVAQUIN) 750 MG/150ML infusion 750 mg, Q24H  posaconazole (NOXAFIL) tablet 200 mg, Dinner        Objective:  /71   Pulse 90   Temp 98 °F (36.7 °C) (Oral)   Resp 20   Ht 5' 4\" (1.626 m) Mucinex  2. Leukocytosis  improving. 3. Continue medical for schizophrenia and Cogentin for tardive dyskinesia  4. Continue posaconazole by March 2020  5.  Hopefully discharge on 1/15/2020     Diet: DIET GENERAL;  Code:Full Code  DVT PPX lovenox       Jodi Kessler MD   1/12/2020 12:01 PM

## 2020-01-13 ENCOUNTER — APPOINTMENT (OUTPATIENT)
Dept: GENERAL RADIOLOGY | Age: 65
DRG: 193 | End: 2020-01-13
Payer: COMMERCIAL

## 2020-01-13 ENCOUNTER — TELEPHONE (OUTPATIENT)
Dept: PULMONOLOGY | Age: 65
End: 2020-01-13

## 2020-01-13 PROBLEM — J47.0 BRONCHIECTASIS WITH ACUTE LOWER RESPIRATORY INFECTION (HCC): Status: ACTIVE | Noted: 2018-08-22

## 2020-01-13 LAB
ANISOCYTOSIS: ABNORMAL
BANDED NEUTROPHILS RELATIVE PERCENT: 3 % (ref 0–7)
BASOPHILS ABSOLUTE: 0 K/UL (ref 0–0.2)
BASOPHILS RELATIVE PERCENT: 0 %
BLOOD CULTURE, ROUTINE: NORMAL
CULTURE, BLOOD 2: NORMAL
EOSINOPHILS ABSOLUTE: 0.2 K/UL (ref 0–0.6)
EOSINOPHILS RELATIVE PERCENT: 1 %
HCT VFR BLD CALC: 43.9 % (ref 40.5–52.5)
HEMOGLOBIN: 15.1 G/DL (ref 13.5–17.5)
LYMPHOCYTES ABSOLUTE: 3.1 K/UL (ref 1–5.1)
LYMPHOCYTES RELATIVE PERCENT: 15 %
MCH RBC QN AUTO: 30.9 PG (ref 26–34)
MCHC RBC AUTO-ENTMCNC: 34.4 G/DL (ref 31–36)
MCV RBC AUTO: 89.8 FL (ref 80–100)
METAMYELOCYTES RELATIVE PERCENT: 1 %
MONOCYTES ABSOLUTE: 1.2 K/UL (ref 0–1.3)
MONOCYTES RELATIVE PERCENT: 6 %
NEUTROPHILS ABSOLUTE: 16.2 K/UL (ref 1.7–7.7)
NEUTROPHILS RELATIVE PERCENT: 74 %
PDW BLD-RTO: 14.2 % (ref 12.4–15.4)
PLATELET # BLD: 273 K/UL (ref 135–450)
PLATELET SLIDE REVIEW: ADEQUATE
PMV BLD AUTO: 7.4 FL (ref 5–10.5)
POLYCHROMASIA: ABNORMAL
RBC # BLD: 4.89 M/UL (ref 4.2–5.9)
SLIDE REVIEW: ABNORMAL
WBC # BLD: 20.8 K/UL (ref 4–11)

## 2020-01-13 PROCEDURE — 94640 AIRWAY INHALATION TREATMENT: CPT

## 2020-01-13 PROCEDURE — 6370000000 HC RX 637 (ALT 250 FOR IP): Performed by: INTERNAL MEDICINE

## 2020-01-13 PROCEDURE — 6370000000 HC RX 637 (ALT 250 FOR IP): Performed by: HOSPITALIST

## 2020-01-13 PROCEDURE — 71046 X-RAY EXAM CHEST 2 VIEWS: CPT

## 2020-01-13 PROCEDURE — 2580000003 HC RX 258: Performed by: HOSPITALIST

## 2020-01-13 PROCEDURE — G0378 HOSPITAL OBSERVATION PER HR: HCPCS

## 2020-01-13 PROCEDURE — 85025 COMPLETE CBC W/AUTO DIFF WBC: CPT

## 2020-01-13 PROCEDURE — 2700000000 HC OXYGEN THERAPY PER DAY

## 2020-01-13 PROCEDURE — 94761 N-INVAS EAR/PLS OXIMETRY MLT: CPT

## 2020-01-13 PROCEDURE — 93005 ELECTROCARDIOGRAM TRACING: CPT | Performed by: INTERNAL MEDICINE

## 2020-01-13 PROCEDURE — 99232 SBSQ HOSP IP/OBS MODERATE 35: CPT | Performed by: INTERNAL MEDICINE

## 2020-01-13 PROCEDURE — 99223 1ST HOSP IP/OBS HIGH 75: CPT | Performed by: INTERNAL MEDICINE

## 2020-01-13 PROCEDURE — 1200000000 HC SEMI PRIVATE

## 2020-01-13 PROCEDURE — 94669 MECHANICAL CHEST WALL OSCILL: CPT

## 2020-01-13 PROCEDURE — 6370000000 HC RX 637 (ALT 250 FOR IP): Performed by: NURSE PRACTITIONER

## 2020-01-13 PROCEDURE — 87449 NOS EACH ORGANISM AG IA: CPT

## 2020-01-13 PROCEDURE — 87641 MR-STAPH DNA AMP PROBE: CPT

## 2020-01-13 PROCEDURE — 36415 COLL VENOUS BLD VENIPUNCTURE: CPT

## 2020-01-13 RX ORDER — LEVOFLOXACIN 750 MG/1
750 TABLET ORAL DAILY
Status: DISCONTINUED | OUTPATIENT
Start: 2020-01-13 | End: 2020-01-15 | Stop reason: HOSPADM

## 2020-01-13 RX ORDER — METRONIDAZOLE 250 MG/1
500 TABLET ORAL EVERY 8 HOURS SCHEDULED
Status: DISCONTINUED | OUTPATIENT
Start: 2020-01-13 | End: 2020-01-15 | Stop reason: HOSPADM

## 2020-01-13 RX ORDER — PREDNISONE 20 MG/1
40 TABLET ORAL DAILY
Status: DISCONTINUED | OUTPATIENT
Start: 2020-01-13 | End: 2020-01-15 | Stop reason: HOSPADM

## 2020-01-13 RX ADMIN — POSACONAZOLE 200 MG: 100 TABLET, COATED ORAL at 18:23

## 2020-01-13 RX ADMIN — METRONIDAZOLE 500 MG: 250 TABLET, FILM COATED ORAL at 14:11

## 2020-01-13 RX ADMIN — GUAIFENESIN 600 MG: 600 TABLET, EXTENDED RELEASE ORAL at 21:00

## 2020-01-13 RX ADMIN — LEVOFLOXACIN 750 MG: 750 TABLET, FILM COATED ORAL at 11:31

## 2020-01-13 RX ADMIN — PALIPERIDONE 6 MG: 3 TABLET, EXTENDED RELEASE ORAL at 08:49

## 2020-01-13 RX ADMIN — Medication 10 ML: at 08:50

## 2020-01-13 RX ADMIN — PANTOPRAZOLE SODIUM 40 MG: 40 TABLET, DELAYED RELEASE ORAL at 07:25

## 2020-01-13 RX ADMIN — BENZTROPINE MESYLATE 1 MG: 1 TABLET ORAL at 08:49

## 2020-01-13 RX ADMIN — GUAIFENESIN 600 MG: 600 TABLET, EXTENDED RELEASE ORAL at 08:49

## 2020-01-13 RX ADMIN — Medication 2 PUFF: at 08:46

## 2020-01-13 RX ADMIN — Medication 10 ML: at 20:36

## 2020-01-13 RX ADMIN — Medication 2 PUFF: at 20:23

## 2020-01-13 RX ADMIN — ACETAMINOPHEN 650 MG: 325 TABLET, FILM COATED ORAL at 07:25

## 2020-01-13 RX ADMIN — TIOTROPIUM BROMIDE INHALATION SPRAY 2 PUFF: 3.12 SPRAY, METERED RESPIRATORY (INHALATION) at 08:46

## 2020-01-13 RX ADMIN — METRONIDAZOLE 500 MG: 250 TABLET, FILM COATED ORAL at 21:00

## 2020-01-13 RX ADMIN — PREDNISONE 40 MG: 20 TABLET ORAL at 11:30

## 2020-01-13 ASSESSMENT — PAIN SCALES - GENERAL
PAINLEVEL_OUTOF10: 0
PAINLEVEL_OUTOF10: 5
PAINLEVEL_OUTOF10: 0

## 2020-01-13 ASSESSMENT — ENCOUNTER SYMPTOMS
COUGH: 1
ABDOMINAL PAIN: 0
SHORTNESS OF BREATH: 1
WHEEZING: 0
DIARRHEA: 0
NAUSEA: 0
EYE REDNESS: 0
TROUBLE SWALLOWING: 0
RHINORRHEA: 0
EYE DISCHARGE: 0
BACK PAIN: 0
CONSTIPATION: 0
SORE THROAT: 0

## 2020-01-13 NOTE — PROGRESS NOTES
[facility] HOSPITALISTS PROGRESS NOTE    1/13/2020 8:08 AM        Name: Vicki Gardner . Admitted: 1/9/2020  Primary Care Provider: Anna Licea MD (Tel: [phone])                        [facility] course:  75 y. o. male who has a history of COPD, aspergillosis, bipolar affective disorder, schizophrenia presented to [facility] with 2 weeks history of cough and shortness of breath.  The patient is not producing much sputum.  This will start with runny nose, runny eyes and dry cough about 2 weeks ago, no sick contacts around him. X-ray showed left lower lobe pneumonia, patient started Levaquin, pulmonology service consulted. Subjective:     feeling little better. Still on oxygen- 2 liters. Minimal phlegm. Still has exertional sob.      Reviewed interval ancillary notes    Current Medications  guaiFENesin (MUCINEX) extended release tablet 600 mg, BID  acetaminophen (TYLENOL) tablet 650 mg, Q4H PRN  enoxaparin (LOVENOX) injection 30 mg, Daily  albuterol sulfate  (90 Base) MCG/ACT inhaler 1 puff, Q6H PRN  tiotropium (SPIRIVA RESPIMAT) 2.5 MCG/ACT inhaler 2 puff, Daily  benztropine (COGENTIN) tablet 1 mg, Daily  mometasone-formoterol (DULERA) 100-5 MCG/ACT inhaler 2 puff, BID  pantoprazole (PROTONIX) tablet 40 mg, QAM AC  paliperidone (INVEGA) extended release tablet 6 mg, QAM  sodium chloride flush 0.9 % injection 10 mL, 2 times per day  sodium chloride flush 0.9 % injection 10 mL, PRN  magnesium hydroxide (MILK OF MAGNESIA) 400 MG/5ML suspension 30 mL, Daily PRN  ondansetron (ZOFRAN) injection 4 mg, Q6H PRN  levofloxacin (LEVAQUIN) 750 MG/150ML infusion 750 mg, Q24H  posaconazole (NOXAFIL) tablet 200 mg, Dinner        Objective:  /69   Pulse 83   Temp 98.6 °F (37 °C) (Oral)   Resp 18   Ht 5' 4\" (1.626 m)   Wt 84 lb 6.4 oz (38.3 kg)   SpO2 94%   BMI 14.49 kg/m²     Intake/Output Summary (Last 24 hours) at 1/13/2020 0808  Last data filed at 1/12/2020 1916  Gross per 24 hour   Intake 600 ml   Output 100 ml   Net 500 ml      Wt Readings from Last 3 Encounters:   01/10/20 84 lb 6.4 oz (38.3 kg)   12/19/19 89 lb (40.4 kg)   11/04/19 92 lb (41.7 kg)       General appearance:  Appears comfortable  Eyes: Sclera clear. Pupils equal.  ENT: Moist oral mucosa. Trachea midline, no adenopathy. Cardiovascular: Regular rhythm, normal S1, S2. No murmur. No edema in lower extremities  Respiratory: Not using accessory muscles. Bilateral crackles at bases, more on left. GI: Abdomen soft, no tenderness, not distended, normal bowel sounds  Musculoskeletal: No cyanosis in digits, neck supple  Neurology: CN 2-12 grossly intact. No speech or motor deficits  Psych: Normal affect. Alert and oriented in time, place and person  Skin: Warm, dry, normal turgor    Labs and Tests:  CBC:   Recent Labs     01/11/20  0545 01/12/20  0706 01/13/20  0524   WBC 24.0* 18.7* 20.8*   HGB 14.2 15.2 15.1    273 273     BMP:    Recent Labs     01/11/20  0545 01/12/20  0706    141   K 3.6 3.7    106   CO2 20* 22   BUN 19 12   CREATININE 0.5* 0.6*   GLUCOSE 116* 95     Hepatic:   No results for input(s): AST, ALT, ALB, BILITOT, ALKPHOS in the last 72 hours. Problem List  Active Problems:    Chronic obstructive pulmonary disease ([facility]    CAP (community acquired pneumonia) due to Chlamydia species    Pneumonia due to organism  Resolved Problems:    * No resolved hospital problems. *     Assessment & Plan:   1. Continue intravenous Levaquin   , cont abx,  Acapella, add Mucinex. Wbc going up. Get ID on board. Get speech eval.   2. Wean oxygen as tolerated  3. Continue posaconazole until  March 2020  4.  Continue medical for schizophrenia and Cogentin for tardive dyskinesia   ptot consult    Disp: inpt 1-2 days     Diet: DIET GENERAL;  Code:Full Code  DVT PPX lovenox         Monica Watson Diaz MD   1/13/2020 8:08 AM

## 2020-01-13 NOTE — TELEPHONE ENCOUNTER
----- Message from Choco Brito MD sent at 1/13/2020 11:18 AM EST -----  Pt needs follow up appt to see Dr Shar Bartholomew or Micheline Bruner in 2 weeks for COPD and lung nodules.

## 2020-01-13 NOTE — CONSULTS
Infectious Diseases   Consult Note        Admission Date: 1/9/2020  Hospital Day: Hospital Day: 5   Attending: Wild Muniz MD  Date of service: 1/13/20     Reason for admission: CAP (community acquired pneumonia)     Chief complaint/ Reason for consult: New left lower lobe pneumonia, bandemia, pulmonary aspergillosis    Microbiology:        I have reviewed allavailable micro lab data and cultures    · Blood culture (2/2) - collected on 1/9/2020: *In process    Antibiotics and immunizations:       Current antibiotics: All antibiotics and their doses were reviewed by me    Recent Abx Admin                   posaconazole (NOXAFIL) tablet 200 mg (mg) 200 mg Given 01/12/20 1711    levofloxacin (LEVAQUIN) 750 MG/150ML infusion 750 mg (mg) 750 mg New Bag 01/12/20 1154                  Immunization History: All immunization history was reviewed by me today. Immunization History   Administered Date(s) Administered    Influenza, Quadv, IM, PF (6 mo and older Fluzone, Flulaval, Fluarix, and 3 yrs and older Afluria) 10/15/2018    Influenza, Triv, inactivated, subunit, adjuvanted, IM (Fluad 65 yrs and older) 10/21/2019    Pneumococcal Conjugate 13-valent ([id number]) 01/30/2018    Pneumococcal Polysaccharide ([id number]) 10/15/2018, 10/21/2019    Tdap (Boostrix, Adacel) 11/28/2018       Known drug allergies: All allergies were reviewed and updated    Allergies   Allergen Reactions    Pcn [Penicillins] Hives     Unknown reaction       Social history:     Social History:  All social andepidemiologic history was reviewed and updated by me today as needed. · Tobacco use:   reports that he quit smoking about 7 years ago. His smoking use included cigarettes. He has a 70.00 pack-year smoking history. He has never used smokeless tobacco.  · Alcohol use:   reports previous alcohol use. · Currently lives in: [place] [address]  ·  reports no history of drug use. Assessment:     The patient is a 75 y.o. patient was noted to have white cell count of 20,200. Chest x-ray was done which showed a left lower lobe pneumonia. The patient was admitted. He was started on empiric oral Levaquin and posaconazole has been continued. He did have a low-grade temp of 99.9 on 1/11/2019. I have been asked to see the patient for this complicated infection. Past Medical History: All past medical history reviewed today. Past Medical History:   Diagnosis Date    Arthritis     Aspergillus ([address] 05/18/2018    resp culture    Bipolar 1 disorder (HCC)     Chronic bronchitis, obstructive (HCC)     COPD (chronic obstructive pulmonary disease) (HCC)     GERD (gastroesophageal reflux disease)     Tourette's          Past Surgical History: All pastsurgical history was reviewed today. Past Surgical History:   Procedure Laterality Date    BRONCHOSCOPY N/A 05/18/2018    BRONCHOSCOPY N/A 11/6/2019    BRONCHOSCOPY WITH LAVAGE performed by Jennie Pacheco MD at [address], COLON, DIAGNOSTIC      KNEE ARTHROSCOPY      bilateral    LUNG BIOPSY  12/2018    TONSILLECTOMY      UPPER GASTROINTESTINAL ENDOSCOPY  9/25/15    UPPER GASTROINTESTINAL ENDOSCOPY N/A 2/21/2019    EGD DILATION BALLOON performed by Cali Monroy MD at [facility] 2/21/2019    EGD BIOPSY performed by Cali Monroy MD at [facility]           Family History: All family history was reviewed today. Problem Relation Age of Onset    Arthritis Mother     Glaucoma Mother     High Blood Pressure Mother     Heart Disease Father         age [de-identified] COPD Father         emphysema    COPD Brother          Medications: All current and past medications were reviewed.     Medications Prior to Admission: posaconazole (NOXAFIL) 100 MG TBEC tablet, Take 2 tablets by mouth daily (with breakfast)  tiotropium (SPIRIVA RESPIMAT) 2.5 MCG/ACT AERS 4325 01/13/20 0851 01/13/20 1126 01/13/20 1127   BP:   108/72    Pulse:   88    Resp:  16 16    Temp:   98.1 °F (36.7 °C)    TempSrc:   Oral    SpO2: 91% 94% 94% 92%   Weight:       Height:           Physical Exam  Vitals signs and nursing note reviewed. Constitutional:       Appearance: Normal appearance. He is well-developed. HENT:      Head: Normocephalic and atraumatic. Right Ear: External ear normal.      Left Ear: External ear normal.      Nose: Nose normal. No congestion or rhinorrhea. Mouth/Throat:      Mouth: Mucous membranes are moist.      Pharynx: No oropharyngeal exudate or posterior oropharyngeal erythema. Eyes:      General: No scleral icterus. Right eye: No discharge. Left eye: No discharge. Conjunctiva/sclera: Conjunctivae normal.      Pupils: Pupils are equal, round, and reactive to light. Neck:      Musculoskeletal: Normal range of motion and neck supple. No neck rigidity or muscular tenderness. Cardiovascular:      Rate and Rhythm: Normal rate and regular rhythm. Pulses: Normal pulses. Heart sounds: No murmur. No friction rub. Pulmonary:      Effort: Pulmonary effort is normal. No respiratory distress. Breath sounds: No stridor. Rales (Coarse crackles involving the left lower lobe) present. No wheezing or rhonchi. Abdominal:      General: Bowel sounds are normal.      Palpations: Abdomen is soft. Tenderness: There is no tenderness. There is no right CVA tenderness, left CVA tenderness, guarding or rebound. Musculoskeletal: Normal range of motion. General: No swelling or tenderness. Lymphadenopathy:      Cervical: No cervical adenopathy. Skin:     General: Skin is warm and dry. Coloration: Skin is not jaundiced. Findings: No erythema or rash. Neurological:      General: No focal deficit present. Mental Status: He is alert and oriented to person, place, and time. Mental status is at baseline.       Motor:

## 2020-01-14 LAB
ANION GAP SERPL CALCULATED.3IONS-SCNC: 8 MMOL/L (ref 3–16)
BUN BLDV-MCNC: 14 MG/DL (ref 7–20)
CALCIUM SERPL-MCNC: 8.8 MG/DL (ref 8.3–10.6)
CHLORIDE BLD-SCNC: 106 MMOL/L (ref 99–110)
CO2: 26 MMOL/L (ref 21–32)
CREAT SERPL-MCNC: 0.5 MG/DL (ref 0.8–1.3)
EKG ATRIAL RATE: 131 BPM
EKG DIAGNOSIS: NORMAL
EKG P AXIS: 87 DEGREES
EKG P-R INTERVAL: 164 MS
EKG Q-T INTERVAL: 288 MS
EKG QRS DURATION: 64 MS
EKG QTC CALCULATION (BAZETT): 405 MS
EKG R AXIS: 43 DEGREES
EKG T AXIS: 63 DEGREES
EKG VENTRICULAR RATE: 119 BPM
GFR AFRICAN AMERICAN: >60
GFR NON-AFRICAN AMERICAN: >60
GLUCOSE BLD-MCNC: 112 MG/DL (ref 70–99)
HCT VFR BLD CALC: 42.9 % (ref 40.5–52.5)
HEMOGLOBIN: 14.6 G/DL (ref 13.5–17.5)
MCH RBC QN AUTO: 30.5 PG (ref 26–34)
MCHC RBC AUTO-ENTMCNC: 34 G/DL (ref 31–36)
MCV RBC AUTO: 89.7 FL (ref 80–100)
MRSA SCREEN RT-PCR: NORMAL
PDW BLD-RTO: 14.2 % (ref 12.4–15.4)
PLATELET # BLD: 301 K/UL (ref 135–450)
PMV BLD AUTO: 7.5 FL (ref 5–10.5)
POTASSIUM SERPL-SCNC: 3.6 MMOL/L (ref 3.5–5.1)
RBC # BLD: 4.78 M/UL (ref 4.2–5.9)
SODIUM BLD-SCNC: 140 MMOL/L (ref 136–145)
STREP PNEUMONIAE ANTIGEN, URINE: NORMAL
WBC # BLD: 16.9 K/UL (ref 4–11)

## 2020-01-14 PROCEDURE — 97530 THERAPEUTIC ACTIVITIES: CPT

## 2020-01-14 PROCEDURE — 97165 OT EVAL LOW COMPLEX 30 MIN: CPT

## 2020-01-14 PROCEDURE — G0378 HOSPITAL OBSERVATION PER HR: HCPCS

## 2020-01-14 PROCEDURE — 6370000000 HC RX 637 (ALT 250 FOR IP): Performed by: HOSPITALIST

## 2020-01-14 PROCEDURE — 6370000000 HC RX 637 (ALT 250 FOR IP): Performed by: INTERNAL MEDICINE

## 2020-01-14 PROCEDURE — 36415 COLL VENOUS BLD VENIPUNCTURE: CPT

## 2020-01-14 PROCEDURE — 87070 CULTURE OTHR SPECIMN AEROBIC: CPT

## 2020-01-14 PROCEDURE — 94761 N-INVAS EAR/PLS OXIMETRY MLT: CPT

## 2020-01-14 PROCEDURE — 97161 PT EVAL LOW COMPLEX 20 MIN: CPT

## 2020-01-14 PROCEDURE — 94760 N-INVAS EAR/PLS OXIMETRY 1: CPT

## 2020-01-14 PROCEDURE — 1200000000 HC SEMI PRIVATE

## 2020-01-14 PROCEDURE — 87205 SMEAR GRAM STAIN: CPT

## 2020-01-14 PROCEDURE — 94669 MECHANICAL CHEST WALL OSCILL: CPT

## 2020-01-14 PROCEDURE — 2700000000 HC OXYGEN THERAPY PER DAY

## 2020-01-14 PROCEDURE — 93010 ELECTROCARDIOGRAM REPORT: CPT | Performed by: INTERNAL MEDICINE

## 2020-01-14 PROCEDURE — 99233 SBSQ HOSP IP/OBS HIGH 50: CPT | Performed by: INTERNAL MEDICINE

## 2020-01-14 PROCEDURE — 85027 COMPLETE CBC AUTOMATED: CPT

## 2020-01-14 PROCEDURE — 80048 BASIC METABOLIC PNL TOTAL CA: CPT

## 2020-01-14 PROCEDURE — 2580000003 HC RX 258: Performed by: HOSPITALIST

## 2020-01-14 PROCEDURE — 94640 AIRWAY INHALATION TREATMENT: CPT

## 2020-01-14 PROCEDURE — 97116 GAIT TRAINING THERAPY: CPT

## 2020-01-14 RX ORDER — METRONIDAZOLE 500 MG/1
500 TABLET ORAL EVERY 8 HOURS SCHEDULED
Qty: 30 TABLET | Refills: 0 | Status: SHIPPED | OUTPATIENT
Start: 2020-01-14 | End: 2020-01-15

## 2020-01-14 RX ORDER — LEVOFLOXACIN 750 MG/1
750 TABLET ORAL DAILY
Qty: 10 TABLET | Refills: 0 | Status: SHIPPED | OUTPATIENT
Start: 2020-01-15 | End: 2020-01-15

## 2020-01-14 RX ADMIN — METRONIDAZOLE 500 MG: 250 TABLET, FILM COATED ORAL at 05:25

## 2020-01-14 RX ADMIN — Medication 10 ML: at 08:19

## 2020-01-14 RX ADMIN — METRONIDAZOLE 500 MG: 250 TABLET, FILM COATED ORAL at 21:36

## 2020-01-14 RX ADMIN — GUAIFENESIN 600 MG: 600 TABLET, EXTENDED RELEASE ORAL at 08:19

## 2020-01-14 RX ADMIN — PANTOPRAZOLE SODIUM 40 MG: 40 TABLET, DELAYED RELEASE ORAL at 05:24

## 2020-01-14 RX ADMIN — BENZTROPINE MESYLATE 1 MG: 1 TABLET ORAL at 08:18

## 2020-01-14 RX ADMIN — PREDNISONE 40 MG: 20 TABLET ORAL at 08:18

## 2020-01-14 RX ADMIN — LEVOFLOXACIN 750 MG: 750 TABLET, FILM COATED ORAL at 08:19

## 2020-01-14 RX ADMIN — METRONIDAZOLE 500 MG: 250 TABLET, FILM COATED ORAL at 14:16

## 2020-01-14 RX ADMIN — GUAIFENESIN 600 MG: 600 TABLET, EXTENDED RELEASE ORAL at 21:36

## 2020-01-14 RX ADMIN — POSACONAZOLE 200 MG: 100 TABLET, COATED ORAL at 18:02

## 2020-01-14 RX ADMIN — PALIPERIDONE 6 MG: 3 TABLET, EXTENDED RELEASE ORAL at 08:19

## 2020-01-14 RX ADMIN — TIOTROPIUM BROMIDE INHALATION SPRAY 2 PUFF: 3.12 SPRAY, METERED RESPIRATORY (INHALATION) at 08:40

## 2020-01-14 RX ADMIN — Medication 2 PUFF: at 20:39

## 2020-01-14 RX ADMIN — Medication 10 ML: at 21:36

## 2020-01-14 RX ADMIN — Medication 2 PUFF: at 08:40

## 2020-01-14 ASSESSMENT — PAIN SCALES - GENERAL
PAINLEVEL_OUTOF10: 0

## 2020-01-14 ASSESSMENT — ENCOUNTER SYMPTOMS
BACK PAIN: 0
EYE REDNESS: 0
RHINORRHEA: 0
EYE DISCHARGE: 0
SORE THROAT: 0
DIARRHEA: 0
WHEEZING: 0
TROUBLE SWALLOWING: 0
CONSTIPATION: 0
NAUSEA: 0
SHORTNESS OF BREATH: 1
ABDOMINAL PAIN: 0
COUGH: 1

## 2020-01-14 NOTE — PROGRESS NOTES
RLE: WNL  Strength LLE  Strength LLE: WNL  Tone RLE  RLE Tone: Normotonic  Tone LLE  LLE Tone: Normotonic  Motor Control  Gross Motor?: WFL  Sensation  Overall Sensation Status: WFL  Bed mobility  Supine to Sit: Unable to assess  Comment: Pt up independently in room and left seated in chair following evaluations  Transfers  Sit to Stand: Supervision  Stand to sit: Supervision  Ambulation  Ambulation?: Yes  Ambulation 1  Surface: level tile  Device: No Device  Other Apparatus: O2(1L--increased to 1.5L after ~1/2 ambulation)  Assistance: Supervision  Quality of Gait: Pt ambulates with mildly decreased step length, good peter, narrow TYESHA, no LOB. Distance: ~300 ft  Comments: Pt taking ~1-2 standing rest breaks during ambulation, primarily when prompted by therapists checking SpO2 with finger probe pulse ox. Pt's SpO2 readings at 84% during first standing rest break. O2 increased at thus time. Pt's SpO2 upon return to room on 1.5L also in low to mid 80's with improvement once at rest, recovering to 90% after ~1 minute. Stairs/Curb  Stairs?: Yes  Stairs  # Steps : 10  Stairs Height: 6\"  Rails: Left ascending  Device: No Device  Assistance: Stand by assistance  Comment: Reciprocal gait ascending, step to gait descending     Balance  Posture: Good  Sitting - Static: Good  Sitting - Dynamic: Good  Standing - Static: Good;-  Standing - Dynamic: Good;-        Plan   Plan  Times per week: 1-2x  Times per day: Daily  Current Treatment Recommendations: Strengthening, ROM, Balance Training, Functional Mobility Training, Transfer Training, Endurance Training, Gait Training, Stair training, Home Exercise Program, Safety Education & Training, Patient/Caregiver Education & Training  Safety Devices  Type of devices:  All fall risk precautions in place, Call light within reach, Left in chair, Nurse notified  Restraints  Initially in place: No    G-Code       OutComes Score                                                  AM-PAC Score  AM-[facility] Inpatient Mobility Raw Score : 23 (01/14/20 1058)  AM-PAC Inpatient T-Scale Score : 56.93 (01/14/20 1058)  Mobility Inpatient CMS 0-100% Score: 11.2 (01/14/20 1058)  Mobility Inpatient CMS G-Code Modifier : CI (01/14/20 1058)          Goals  Short term goals  Time Frame for Short term goals:  To be met prior to discharge  Short term goal 1: Pt will complete bed mobility with independence  Short term goal 2: Pt will complete sit to/from stand with independence  Short term goal 3: Pt will ambulate 200 ft with no AD and independence  Short term goal 4: Pt will navigate up/down 7 steps with HR and mod I       Therapy Time   Individual Concurrent Group Co-treatment   Time In 0914         Time Out 0937         Minutes 23         Timed Code Treatment Minutes: 8 Minutes     Franklyn Causey, Arturo Rodriguez, DPT, [id number]

## 2020-01-14 NOTE — FLOWSHEET NOTE
01/14/20 0900 01/14/20 0913   Oxygen Therapy   SpO2 (!) 89 % 93 %   O2 Device None (Room air) Nasal cannula   O2 Flow Rate (L/min)  --  1 L/min   Attempted to take pt off of supplemental O2-pt unable to tolerate. Placed pt back on 1L O2 to maintain sats >90%. Will monitor.

## 2020-01-14 NOTE — PROGRESS NOTES
Occupational Therapy   Occupational Therapy Initial Assessment  Date: 2020   Patient Name: Rhonda Gutiérrez  MRN: [id number]     : 1955    Date of Service: 2020    Discharge Recommendations: Rhonda Gutiérrez scored a 23/24 on the -[facility] ADL Inpatient form. At this time, no further OT is recommended upon discharge due to patient near baseline function. Recommend patient returns to prior setting with prior services. OT Equipment Recommendations  Equipment Needed: No    Assessment   Performance deficits / Impairments: Decreased endurance;Decreased high-level IADLs;Decreased functional mobility   Assessment: Patient presents slightly below baseline function; OT indicated during hospital stay to facilitate safety/endurance with IADL and ADL  Treatment Diagnosis: Above deficits associated with PNA  Prognosis: Good  Decision Making: Low Complexity  Exam: as above  OT Education: OT Role;Plan of Care  Patient Education: OT role, POC, discharge, eval  REQUIRES OT FOLLOW UP: Yes  Activity Tolerance  Activity Tolerance: Patient Tolerated treatment well;Patient limited by fatigue  Activity Tolerance: 83-88% with 1L O2 during stance, short distance ambulation, increased to 90% on 1.5L for activity/long distance ambulation  Safety Devices  Safety Devices in place: Yes  Type of devices: All fall risk precautions in place; Left in chair;Call light within reach;Nurse notified(medium fall risk)           Patient Diagnosis(es): The primary encounter diagnosis was Pneumonia due to organism. A diagnosis of Septicemia ([address] was also pertinent to this visit. has a past medical history of Arthritis, Aspergillus ([address] Bipolar 1 disorder ([address] Chronic bronchitis, obstructive ([address] COPD (chronic obstructive pulmonary disease) ([address] GERD (gastroesophageal reflux disease), and Tourette's.   has a past surgical history that includes Knee arthroscopy; Vasectomy; Tonsillectomy;  Endoscopy, colon, diagnostic; Upper Yes  Mode of Transportation: Car  Leisure & Hobbies: TV  Additional Comments: reports no falls in past 6 months       Objective   Vision: Impaired  Vision Exceptions: Wears glasses for distance(driving - reports he should wear all the time)  Hearing: Within functional limits    Orientation  Overall Orientation Status: Within Functional Limits  Observation/Palpation  Posture: Good  Balance  Sitting Balance: Independent  Standing Balance: Supervision  Standing Balance  Time: ~9 min total  Activity: ambulation, stairs, transfers, static stance during conversation for eval  Functional Mobility  Functional - Mobility Device: No device  Activity: Other  Assist Level: Supervision  Functional Mobility Comments: ~200' x2 in hallway, up/down flight of steps - 83-88% with 1L O2, increased to 90% on 1.5L for activity/ambulation  ADL  Additional Comments: Pt up ad jeannine in room.  Anticipate independence toileting, ADL  Tone RUE  RUE Tone: Normotonic  Tone LUE  LUE Tone: Normotonic     Bed mobility  Comment: Pt up ad jeannine in room, ZEYNEP  Transfers  Sit to stand: Supervision  Stand to sit: Supervision     Cognition  Overall Cognitive Status: WFL        Sensation  Overall Sensation Status: WFL        LUE AROM (degrees)  LUE AROM : WFL  RUE AROM (degrees)  RUE AROM : WFL  LUE Strength  Gross LUE Strength: WFL  RUE Strength  Gross RUE Strength: WFL                   Plan   Plan  Times per week: 1-2  Times per day: Daily  Current Treatment Recommendations: Home Management Training, Equipment Evaluation, Education, & procurement, Endurance Training    G-Code     OutComes Score                                                  AM-PAC Score        AM-[facility] Inpatient Daily Activity Raw Score: 23 (01/14/20 1056)  AM-PAC Inpatient ADL T-Scale Score : 51.12 (01/14/20 1056)  ADL Inpatient CMS 0-100% Score: 15.86 (01/14/20 1056)  ADL Inpatient CMS G-Code Modifier : CI (01/14/20 1056)    Goals  Short term goals  Time Frame for Short term goals: discharge  Short term goal 1: Light IADL supervision  Short term goal 2:  [place] with energy conservation techniques  Long term goals  Time Frame for Long term goals : LTG=STG       Therapy Time   Individual Concurrent Group Co-treatment   Time In 0914         Time Out 0937         Minutes 23            Timed Code Treatment Minutes:   8 minutes    Total Treatment Minutes:  23 minutes    Rony Maldonado OTR/L [id number]    Keyona Schroeder

## 2020-01-14 NOTE — PROGRESS NOTES
[facility] HOSPITALISTS PROGRESS NOTE    1/14/2020 8:53 AM        Name: Luam Quiles . Admitted: 1/9/2020  Primary Care Provider: [place], MD (Tel: [phone])                        [facility] course:  75 y. o. male who has a history of COPD, aspergillosis, bipolar affective disorder, schizophrenia presented to [facility] with 2 weeks history of cough and shortness of breath.  The patient is not producing much sputum.  This will start with runny nose, runny eyes and dry cough about 2 weeks ago, no sick contacts around him. X-ray showed left lower lobe pneumonia, patient started Levaquin, pulmonology service consulted. Subjective: On RA- sating at 80. Says he is not SOB.      Reviewed interval ancillary notes    Current Medications  levofloxacin (LEVAQUIN) tablet 750 mg, Daily  predniSONE (DELTASONE) tablet 40 mg, Daily  metroNIDAZOLE (FLAGYL) tablet 500 mg, 3 times per day  guaiFENesin (MUCINEX) extended release tablet 600 mg, BID  acetaminophen (TYLENOL) tablet 650 mg, Q4H PRN  enoxaparin (LOVENOX) injection 30 mg, Daily  albuterol sulfate  (90 Base) MCG/ACT inhaler 1 puff, Q6H PRN  tiotropium (SPIRIVA RESPIMAT) 2.5 MCG/ACT inhaler 2 puff, Daily  benztropine (COGENTIN) tablet 1 mg, Daily  mometasone-formoterol (DULERA) 100-5 MCG/ACT inhaler 2 puff, BID  pantoprazole (PROTONIX) tablet 40 mg, QAM AC  paliperidone (INVEGA) extended release tablet 6 mg, QAM  sodium chloride flush 0.9 % injection 10 mL, 2 times per day  sodium chloride flush 0.9 % injection 10 mL, PRN  magnesium hydroxide (MILK OF MAGNESIA) 400 MG/5ML suspension 30 mL, Daily PRN  ondansetron (ZOFRAN) injection 4 mg, Q6H PRN  posaconazole (NOXAFIL) tablet 200 mg, Dinner        Objective:  /76   Pulse 85   Temp 97.5 °F (36.4 °C) (Temporal)   Resp 16   Ht 5' 4\" (1.626 m)   Wt 82 lb 3.2 oz (37.3 kg)   SpO2 90%   BMI 14.11 kg/m²     Intake/Output Summary (Last 24 hours) at 1/14/2020 0853  Last data filed at 1/13/2020 1842  Gross per 24 hour   Intake 480 ml   Output 400 ml   Net 80 ml      Wt Readings from Last 3 Encounters:   01/14/20 82 lb 3.2 oz (37.3 kg)   12/19/19 89 lb (40.4 kg)   11/04/19 92 lb (41.7 kg)       General appearance:  Appears comfortable  Eyes: Sclera clear. Pupils equal.  ENT: Moist oral mucosa. Trachea midline, no adenopathy. Cardiovascular: Regular rhythm, normal S1, S2. No murmur. No edema in lower extremities  Respiratory: Not using accessory muscles. Improving air entry. No wheezing . GI: Abdomen soft, no tenderness, not distended, normal bowel sounds  Musculoskeletal: No cyanosis in digits, neck supple  Neurology: CN 2-12 grossly intact. No speech or motor deficits  Psych: Normal affect. Alert and oriented in time, place and person  Skin: Warm, dry, normal turgor    Labs and Tests:  CBC:   Recent Labs     01/12/20  0706 01/13/20  0524 01/14/20  0755   WBC 18.7* 20.8* 16.9*   HGB 15.2 15.1 14.6    273 301     BMP:    Recent Labs     01/12/20  0706 01/14/20  0755    140   K 3.7 3.6    106   CO2 22 26   BUN 12 14   CREATININE 0.6* 0.5*   GLUCOSE 95 112*     Hepatic:   No results for input(s): AST, ALT, ALB, BILITOT, ALKPHOS in the last 72 hours. Problem List  Active Problems:    Chronic obstructive pulmonary disease (HCC)    Smoker    Bronchiectasis with acute lower respiratory infection ([address]    CAP (community acquired pneumonia) due to Chlamydia species    Pneumonia of left lower lobe due to infectious organism ([address]    Acute respiratory failure with hypoxia ([address]    Emaciation ([address]    Bandemia    Lung nodules    Hypogammaglobulinemia ([address]  Resolved Problems:    * No resolved hospital problems. *     Assessment & Plan:   1. Continue intravenous Levaquin , flagyl and  Acapella  Mucinex. Wbc down little today. Now on prednisone too.  ID on

## 2020-01-14 NOTE — CARE COORDINATION
Patient is okay to be discharged from infectious disease standpoint, once cleared by primary service and other sub-specialties. My final orders are in the chart. Feel free to call me with questions, if needed.       Orquidea Julian MD, MPH  1/14/2020, 3:44 PM  [facility] Infectious Disease   Office: [phone]  Fax: [phone]  Tuesday AM clinic:   [address] [place] [address]  Thursday AM clinic: [address] [facility]

## 2020-01-14 NOTE — PROGRESS NOTES
Infectious Diseases   Progress Note      Admission Date: 1/9/2020  Hospital Day: Hospital Day: 6   Attending: Wild Muniz MD  Date of service: 1/14/2020     Chief complaint/ Reason for consult: The patient was seen today for the following:    · Acute respiratory failure with hypoxia  · Left lower lobe pneumonia  · Bandemia, new during this admission  · Pulmonary aspergillosis  · Status post broncholith removal at [facility] in June 2018  · Multiple lung nodules on CT scans of chest, thought to be from a aspergillus infection    Microbiology:        I have reviewed allavailable micro lab data and cultures    · Blood culture (2/2) - collected on 1/9/2020: In process        Antibiotics and immunizations:       Current antibiotics: All antibiotics and their doses were reviewed by me    Recent Abx Admin                   levofloxacin (LEVAQUIN) tablet 750 mg (mg) 750 mg Given 01/14/20 0819    metroNIDAZOLE (FLAGYL) tablet 500 mg (mg) 500 mg Given 01/14/20 0525     500 mg Given 01/13/20 2100     500 mg Given  1411    posaconazole (NOXAFIL) tablet 200 mg (mg) 200 mg Given 01/13/20 1823                  Immunization History: All immunization history was reviewed by me today. Immunization History   Administered Date(s) Administered    Influenza, Quadv, IM, PF (6 mo and older Fluzone, Flulaval, Fluarix, and 3 yrs and older Afluria) 10/15/2018    Influenza, Triv, inactivated, subunit, adjuvanted, IM (Fluad 65 yrs and older) 10/21/2019    Pneumococcal Conjugate 13-valent ([id number]) 01/30/2018    Pneumococcal Polysaccharide (Bahhbgsml23) 10/15/2018, 10/21/2019    Tdap (Boostrix, Adacel) 11/28/2018       Known drug allergies: All allergies were reviewed and updated    Allergies   Allergen Reactions    Pcn [Penicillins] Hives     Unknown reaction       Social history:     Social History:  All social andepidemiologic history was reviewed and updated by me today as needed.      · Tobacco use:   reports that he quit smoking about 7 years ago. His smoking use included cigarettes. He has a 70.00 pack-year smoking history. He has never used smokeless tobacco.  · Alcohol use:   reports previous alcohol use. · Currently lives in: [facility]  ·  reports no history of drug use. Assessment:     The patient is a 75 y.o. old male who  has a past medical history of Arthritis, Aspergillus ([address] (05/18/2018), Bipolar 1 disorder ([facility] Chronic bronchitis, obstructive ([address] COPD (chronic obstructive pulmonary disease) ([facility] GERD (gastroesophageal reflux disease), and Tourette's. with following problems:    · Acute respiratory failure with hypoxia -improving  · Left lower lobe pneumonia-covered with Levaquin and Flagyl  · Bandemia, new during this admission-improving  · Pulmonary aspergillosis-covered with posaconazole  · Status post broncholith removal at [facility] in June 2018  · Multiple lung nodules on CT scans of chest, thought to be from a aspergillus infection  · COPD  · Centrilobular emphysema  · Hypogammaglobulinemia with low IgM and IgG levels  · Bipolar affective disorder  · Bronchiectasis  · Smoker  · Initiated      Discussion:      The patient is on posaconazole for Aspergillus pneumonia. I had started the patient on oral Levaquin and Flagyl yesterday for coverage for new aspiration pneumonia. He is tolerating the antibiotic okay. Nasal MRSA screen is negative. Urine pneumococcal antigen is negative. EKG reviewed. QTc interval was 405 ms which is okay. Serum creatinine 0.5. Plan:     Diagnostic Workup:      · Continue to follow  fever curve, WBC count and blood cultures  · Follow up on liver and renal function    Antimicrobials:    · Will continue oral posaconazole 200 mg daily with meals for pulmonary aspergillosis  · Continue p.o. Levaquin 750 mg every 24 hour  · Continue p.o.  Flagyl 500 mg every 8 hour  · Recommend tapering and stopping steroids in the next few days  · Recommend continuing cultures, imaging, development and implementation of treatment plan and coordination of complex care). - Over 50% of time spent with patient face to face on counseling and education. Thank you for involving me in the care of your patient. I will continue to follow. If you have any additional questions, please do not hesitate to contact me. Subjective: Interval history: Patient was seen and examined at bedside. Interval history was obtained. The patient appears tired. He is tolerating antibiotic okay. Oxygen requirement is improving. No fever. REVIEW OF SYSTEMS:    Review of Systems   Constitutional: Positive for fatigue. Negative for chills, diaphoresis and fever. HENT: Negative for ear discharge, ear pain, rhinorrhea, sore throat and trouble swallowing. Eyes: Negative for discharge and redness. Respiratory: Positive for cough and shortness of breath. Negative for wheezing. Cardiovascular: Negative for chest pain and leg swelling. Gastrointestinal: Negative for abdominal pain, constipation, diarrhea and nausea. Endocrine: Negative for polyuria. Genitourinary: Negative for dysuria, flank pain, frequency, hematuria and urgency. Musculoskeletal: Negative for back pain and myalgias. Skin: Negative for rash. Neurological: Negative for dizziness, seizures and headaches. Hematological: Does not bruise/bleed easily. Psychiatric/Behavioral: Negative for hallucinations and suicidal ideas. All other systems reviewed and are negative. Past Medical History: All past medical history reviewed today. Past Medical History:   Diagnosis Date    Arthritis     Aspergillus ([facility] 05/18/2018    resp culture    Bipolar 1 disorder (HCC)     Chronic bronchitis, obstructive (HCC)     COPD (chronic obstructive pulmonary disease) (HCC)     GERD (gastroesophageal reflux disease)     Tourette's        Past Surgical History: All past surgical history was reviewed today.     Past Surgical History:   Procedure Laterality Date    BRONCHOSCOPY N/A 05/18/2018    BRONCHOSCOPY N/A 11/6/2019    BRONCHOSCOPY WITH LAVAGE performed by Alla Ortega MD at [address], COLON, DIAGNOSTIC      KNEE ARTHROSCOPY      bilateral    LUNG BIOPSY  12/2018    TONSILLECTOMY      UPPER GASTROINTESTINAL ENDOSCOPY  9/25/15    UPPER GASTROINTESTINAL ENDOSCOPY N/A 2/21/2019    EGD DILATION BALLOON performed by Luci Rosas MD at [facility] 2/21/2019    EGD BIOPSY performed by Luci Rosas MD at [facility]         Family History: All family history was reviewed today. Problem Relation Age of Onset    Arthritis Mother     Glaucoma Mother     High Blood Pressure Mother     Heart Disease Father         age [de-identified] COPD Father         emphysema    COPD Brother        Objective:       PHYSICAL EXAM:      Vitals:   Vitals:    01/14/20 0840 01/14/20 0900 01/14/20 0913 01/14/20 1145   BP:    117/74   Pulse:    95   Resp: 16   16   Temp:    97.5 °F (36.4 °C)   TempSrc:    Temporal   SpO2: 90% (!) 89% 93% 91%   Weight:       Height:           Physical Exam  Vitals signs and nursing note reviewed. Constitutional:       Appearance: Normal appearance. He is well-developed. HENT:      Head: Normocephalic and atraumatic. Right Ear: External ear normal.      Left Ear: External ear normal.      Nose: Nose normal. No congestion or rhinorrhea. Mouth/Throat:      Mouth: Mucous membranes are moist.      Pharynx: No oropharyngeal exudate or posterior oropharyngeal erythema. Eyes:      General: No scleral icterus. Right eye: No discharge. Left eye: No discharge. Conjunctiva/sclera: Conjunctivae normal.      Pupils: Pupils are equal, round, and reactive to light. Neck:      Musculoskeletal: Normal range of motion and neck supple. No neck rigidity or muscular tenderness. ALKPHOS 91 01/09/2020    ALT 20 01/09/2020    AST 22 01/09/2020    PROT 6.7 01/09/2020    PROT 6.6 08/27/2012    BILITOT 0.5 01/09/2020    BILIDIR 0.11 08/27/2012    IBILI 0.2 08/27/2012    LABALBU 3.2 01/09/2020       CPK: No results found for: CKTOTAL  ESR: No results found for: SEDRATE  CRP: No results found for: CRP        Imaging: All pertinent images and reports for the current visit were reviewed by me during this visit. XR CHEST STANDARD (2 VW)   Final Result   No significant change of the bibasilar airspace disease, due to atelectasis   and/or pneumonia. XR CHEST STANDARD (2 VW)   Final Result   Increased opacity in the base of the left lower lobe worrisome for persistent   pneumonia. Increased right basilar opacity may only be due to atelectasis   but additional pneumonia possible. Severe emphysematous changes. XR CHEST STANDARD (2 VW)   Final Result   1. Left lower lobe pneumonia, superimposed on COPD. 2. Stable right upper lobe noncalcified nodule. Please refer to chest CT of   December 16, 2019 for further comment and follow-up recommendations. Medications: All current and past medications were reviewed.      levofloxacin  750 mg Oral Daily    predniSONE  40 mg Oral Daily    metroNIDAZOLE  500 mg Oral 3 times per day    guaiFENesin  600 mg Oral BID    enoxaparin  30 mg Subcutaneous Daily    tiotropium  2 puff Inhalation Daily    benztropine  1 mg Oral Daily    mometasone-formoterol  2 puff Inhalation BID    pantoprazole  40 mg Oral QAM AC    paliperidone  6 mg Oral QAM    sodium chloride flush  10 mL Intravenous 2 times per day    posaconazole  200 mg Oral Dinner           acetaminophen, albuterol sulfate HFA, sodium chloride flush, magnesium hydroxide, ondansetron      Problem list:       Patient Active Problem List   Diagnosis Code    Bronchitis J40    Chronic obstructive pulmonary disease (HCC) J44.9    Bipolar affective disorder ([facility] F31.9    Smoker F17.200    Weight loss R63.4    Hypoxemia R09.02    Weight loss R63.4    Tobacco use disorder F17.200    Pulmonary emphysema (HCC) J43.9    Pulmonary nodule R91.1    Granulomatous lung disease (HCC) J84.10    Asthma J45.909    Imbalance R26.89    Transient cerebral ischemia G45.9    Slurred speech R47.81    Gastroesophageal reflux disease without esophagitis K21.9    SIRS (systemic inflammatory response syndrome) (HCC) R65.10    Dysarthria R47.1    Dizziness R42    Gait abnormality R26.9    Aphasia R47.01    Nausea vomiting and diarrhea R11.2, R19.7    Blood culture positive for microorganism R79.89    Tobacco abuse counseling Z71.6    Diarrhea R19.7    Abnormal CT of the chest R93.89    COPD, mild (HCC) J44.9    Bronchiectasis with acute lower respiratory infection (HCC) J47.0    Pulmonary aspergillosis (HCC) B44.1    Centrilobular emphysema (HCC) J43.2    CAP (community acquired pneumonia) due to Chlamydia species J16.0    Pneumonia of left lower lobe due to infectious organism ([address] J18.1    Acute respiratory failure with hypoxia ([address] J96.01    Emaciation ([address] E41    Bandemia D72.825    Lung nodules R91.8    Hypogammaglobulinemia (HCC) D80.1       Please note that this chart was generated using Dragon dictation software. Although every effort was made to ensure the accuracy of this automated transcription, some errors in transcription may have occurred inadvertently. If you may need any clarification, please do not hesitate to contact me through EPIC or at the phone number provided below with my electronic signature. Any pictures or media included in this note were obtained after taking informed verbal consent from the patient and with their approval to include those in the patient's medical record.     Krystina Skinner MD, MPH  1/14/2020 , 1:35 PM   [facility] Infectious Disease   Office: [phone]  Fax: [phone]  Tuesday AM clinic:   [address] [place] [address] 120  Thursday AM MIRYAM:[id number] [address], [facility]

## 2020-01-14 NOTE — PROGRESS NOTES
· Current Body Wt: 82 lb (37.2 kg)  · % Weight Change:  ,  Unable to determine-hx reveals overall stable in 80's, however no \"actual\" wts documented  · Ideal Body Wt: 130 lb (59 kg), % Ideal Body 63%  · BMI Classification: BMI <18.5 Underweight    Nutrition Interventions:   Continue current diet  Continued Inpatient Monitoring, Education Not Indicated    Nutrition Evaluation:   · Evaluation: Goals set   · Goals: po intake to remain % of meals    · Monitoring: Meal Intake, Weight      Electronically signed by Octavio Carter RD, LD on 1/14/20 at 3:03 PM    Contact Number: [phone]

## 2020-01-15 VITALS
RESPIRATION RATE: 18 BRPM | WEIGHT: 83.2 LBS | HEIGHT: 64 IN | HEART RATE: 88 BPM | OXYGEN SATURATION: 93 % | SYSTOLIC BLOOD PRESSURE: 126 MMHG | DIASTOLIC BLOOD PRESSURE: 76 MMHG | TEMPERATURE: 98 F | BODY MASS INDEX: 14.2 KG/M2

## 2020-01-15 PROCEDURE — 94680 O2 UPTK RST&XERS DIR SIMPLE: CPT

## 2020-01-15 PROCEDURE — 94760 N-INVAS EAR/PLS OXIMETRY 1: CPT

## 2020-01-15 PROCEDURE — 2700000000 HC OXYGEN THERAPY PER DAY

## 2020-01-15 PROCEDURE — 94669 MECHANICAL CHEST WALL OSCILL: CPT

## 2020-01-15 PROCEDURE — 94761 N-INVAS EAR/PLS OXIMETRY MLT: CPT

## 2020-01-15 PROCEDURE — 2580000003 HC RX 258: Performed by: HOSPITALIST

## 2020-01-15 PROCEDURE — 99232 SBSQ HOSP IP/OBS MODERATE 35: CPT | Performed by: INTERNAL MEDICINE

## 2020-01-15 PROCEDURE — 6370000000 HC RX 637 (ALT 250 FOR IP): Performed by: HOSPITALIST

## 2020-01-15 PROCEDURE — 6370000000 HC RX 637 (ALT 250 FOR IP): Performed by: INTERNAL MEDICINE

## 2020-01-15 PROCEDURE — 94640 AIRWAY INHALATION TREATMENT: CPT

## 2020-01-15 PROCEDURE — G0378 HOSPITAL OBSERVATION PER HR: HCPCS

## 2020-01-15 PROCEDURE — 96366 THER/PROPH/DIAG IV INF ADDON: CPT

## 2020-01-15 RX ORDER — LEVOFLOXACIN 750 MG/1
750 TABLET ORAL DAILY
Qty: 10 TABLET | Refills: 0 | Status: SHIPPED | OUTPATIENT
Start: 2020-01-15 | End: 2020-01-15

## 2020-01-15 RX ORDER — PREDNISONE 20 MG/1
20 TABLET ORAL DAILY
Qty: 5 TABLET | Refills: 0 | Status: SHIPPED | OUTPATIENT
Start: 2020-01-16 | End: 2020-01-21

## 2020-01-15 RX ORDER — METRONIDAZOLE 500 MG/1
500 TABLET ORAL EVERY 8 HOURS SCHEDULED
Qty: 30 TABLET | Refills: 0 | Status: SHIPPED | OUTPATIENT
Start: 2020-01-15 | End: 2020-01-25

## 2020-01-15 RX ORDER — METRONIDAZOLE 500 MG/1
500 TABLET ORAL EVERY 8 HOURS SCHEDULED
Qty: 30 TABLET | Refills: 0 | Status: SHIPPED | OUTPATIENT
Start: 2020-01-15 | End: 2020-01-15

## 2020-01-15 RX ORDER — LEVOFLOXACIN 750 MG/1
750 TABLET ORAL DAILY
Qty: 10 TABLET | Refills: 0 | Status: SHIPPED | OUTPATIENT
Start: 2020-01-15 | End: 2020-01-25

## 2020-01-15 RX ORDER — GUAIFENESIN 600 MG/1
600 TABLET, EXTENDED RELEASE ORAL 2 TIMES DAILY
Qty: 10 TABLET | Refills: 0 | Status: SHIPPED | OUTPATIENT
Start: 2020-01-15 | End: 2020-06-09 | Stop reason: ALTCHOICE

## 2020-01-15 RX ADMIN — GUAIFENESIN 600 MG: 600 TABLET, EXTENDED RELEASE ORAL at 09:42

## 2020-01-15 RX ADMIN — Medication 10 ML: at 09:48

## 2020-01-15 RX ADMIN — BENZTROPINE MESYLATE 1 MG: 1 TABLET ORAL at 09:42

## 2020-01-15 RX ADMIN — Medication 2 PUFF: at 07:50

## 2020-01-15 RX ADMIN — METRONIDAZOLE 500 MG: 250 TABLET, FILM COATED ORAL at 06:25

## 2020-01-15 RX ADMIN — TIOTROPIUM BROMIDE INHALATION SPRAY 2 PUFF: 3.12 SPRAY, METERED RESPIRATORY (INHALATION) at 08:49

## 2020-01-15 RX ADMIN — LEVOFLOXACIN 750 MG: 750 TABLET, FILM COATED ORAL at 09:44

## 2020-01-15 RX ADMIN — METRONIDAZOLE 500 MG: 250 TABLET, FILM COATED ORAL at 13:47

## 2020-01-15 RX ADMIN — PALIPERIDONE 6 MG: 3 TABLET, EXTENDED RELEASE ORAL at 09:42

## 2020-01-15 RX ADMIN — PANTOPRAZOLE SODIUM 40 MG: 40 TABLET, DELAYED RELEASE ORAL at 06:25

## 2020-01-15 RX ADMIN — PREDNISONE 40 MG: 20 TABLET ORAL at 09:42

## 2020-01-15 ASSESSMENT — ENCOUNTER SYMPTOMS
SHORTNESS OF BREATH: 0
WHEEZING: 0
EYE DISCHARGE: 0
NAUSEA: 0
BACK PAIN: 0
EYE REDNESS: 0
CONSTIPATION: 0
ABDOMINAL PAIN: 0
TROUBLE SWALLOWING: 0
SORE THROAT: 0
DIARRHEA: 0
COUGH: 1
RHINORRHEA: 0

## 2020-01-15 ASSESSMENT — PAIN SCALES - GENERAL
PAINLEVEL_OUTOF10: 0

## 2020-01-15 NOTE — PROGRESS NOTES
Home Oxygen Evaluation    Option 2      Patients room air at rest saturation SpO2 93%    Patients room air saturation SpO2 91% with exertion

## 2020-01-15 NOTE — PLAN OF CARE
Problem: Falls - Risk of:  Goal: Will remain free from falls  Description  Will remain free from falls  Outcome: Ongoing  Goal: Absence of physical injury  Description  Absence of physical injury  Outcome: Ongoing  Note:   Room de-cluttered. Non-skid socks on. Will continue to monitor. Problem: Respiratory  Goal: No pulmonary complications  Outcome: Ongoing  Goal: O2 Sat > 90%  Outcome: Ongoing  Note:   Pt is on 1L oxygen nasal cannula to keep O2 sat 90%. Lungs sound clear. Will continue to monitor.     Goal: Supplemental O2 requirements decreased  Outcome: Ongoing     Problem: Nutrition  Goal: Optimal nutrition therapy  1/14/2020 1504 by Ann Medrano RD, LD  Outcome: Ongoing

## 2020-01-15 NOTE — PROGRESS NOTES
smoking about 7 years ago. His smoking use included cigarettes. He has a 70.00 pack-year smoking history. He has never used smokeless tobacco.  · Alcohol use:   reports previous alcohol use. · Currently lives in: [facility]  ·  reports no history of drug use. Assessment:     The patient is a 75 y.o. old male who  has a past medical history of Arthritis, Aspergillus ([address] (05/18/2018), Bipolar 1 disorder ([address] Chronic bronchitis, obstructive ([address] COPD (chronic obstructive pulmonary disease) ([address] GERD (gastroesophageal reflux disease), and Tourette's. with following problems:    · Acute respiratory failure with hypoxia-resolved  · Left lower lobe pneumonia-improving on current antibiotics  · Bandemia, new during this admission-improving  · Pulmonary aspergillosis-remains on posaconazole  · Status post broncholith removal at [facility] in June 2018  · Multiple lung nodules on CT scans of chest, thought to be from a aspergillus infection  · COPD  · Centrilobular emphysema  · Hypogammaglobulinemia with low IgM and IgG levels  · Bipolar affective disorder  · Bronchiectasis  · Smoker        Discussion:      He is on oral Levaquin and Flagyl for pneumonia and is tolerating it okay. He is also on posaconazole for pulmonary aspergillosis and is tolerating it well as well    He is afebrile.     Plan:     Diagnostic Workup:    · Continue to follow  fever curve, WBC count and blood cultures  · Follow up on liver and renal function    Antimicrobials:    · Continue oral Levaquin and Flagyl continue to be continued until January 24  · He was advised not to drink any alcohol while on Flagyl  · Continue oral posaconazole long-term with male for pulmonary aspergillosis  · Oxygen support to maintain oxygen saturation above 92%  · Follow-up with me in my office in around 6 weeks  · Continue to watch for new fever at home  · Continue to watch for any new diarrhea at home  · Discussed all above with patient and RN      Drug Monitoring:    · Continue monitoring for antibiotic toxicity as follows: CMP, QTc interval  · Continue to watch for following: new or worsening fever, new hypotension, hives, lip swelling and redness or purulence at vascular access sites. I/v access Management:    · Continue to monitor i.v access sites for erythema, induration, discharge or tenderness. · As always, continue efforts to minimize tubes/lines/drains as clinically appropriate to reduce chances of line associated infections. Patient education and counseling:       · The patient was educated in detail about the side-effects of various antibiotics and things to watch for like new rashes, lip swelling, severe reaction, worsening diarrhea, break through fever etc.  · Discussed patient's condition and what to expect. All of the patient's questions were addressed in a satisfactory manner and patient verbalized understanding all instructions. Smoking cessation/ Tobacco use disorder counseling:    I have counseled the patient extensively about risks of smoking and have encouraged the patient to maintain a healthy smoke-free lifestyle. Information was given about various smoking cessation programs and their websites like www.smokefree.gov, [place]. quitlogix. org as well as help lines like 1-800-QUIT-NOW and 1-800SurfAirLUNGSurfAirUSA. TIME SPENT TODAY:     - Spent over  25  minutes on visit (including interval history, physical exam, review of data including labs, cultures, imaging, development and implementation of treatment plan and coordination of complex care). - Over 50% of time spent with patient face to face on counseling and education. Thanks for allowing me to participate in your patient's care. I will sign off today, but will be available to answer any further questions or concerns that may arise during patient's stay in the hospital.        Subjective: Interval history: Patient was seen and examined at bedside. Interval history was obtained.

## 2020-01-16 LAB
CULTURE, RESPIRATORY: NORMAL
GRAM STAIN RESULT: NORMAL

## 2020-02-13 ENCOUNTER — OFFICE VISIT (OUTPATIENT)
Dept: PULMONOLOGY | Age: 65
End: 2020-02-13
Payer: COMMERCIAL

## 2020-02-13 VITALS
SYSTOLIC BLOOD PRESSURE: 137 MMHG | RESPIRATION RATE: 18 BRPM | WEIGHT: 81 LBS | OXYGEN SATURATION: 97 % | HEIGHT: 64 IN | BODY MASS INDEX: 13.83 KG/M2 | DIASTOLIC BLOOD PRESSURE: 85 MMHG | TEMPERATURE: 98.6 F | HEART RATE: 102 BPM

## 2020-02-13 PROCEDURE — 1036F TOBACCO NON-USER: CPT | Performed by: NURSE PRACTITIONER

## 2020-02-13 PROCEDURE — G8926 SPIRO NO PERF OR DOC: HCPCS | Performed by: NURSE PRACTITIONER

## 2020-02-13 PROCEDURE — G8419 CALC BMI OUT NRM PARAM NOF/U: HCPCS | Performed by: NURSE PRACTITIONER

## 2020-02-13 PROCEDURE — 1111F DSCHRG MED/CURRENT MED MERGE: CPT | Performed by: NURSE PRACTITIONER

## 2020-02-13 PROCEDURE — 3017F COLORECTAL CA SCREEN DOC REV: CPT | Performed by: NURSE PRACTITIONER

## 2020-02-13 PROCEDURE — G8482 FLU IMMUNIZE ORDER/ADMIN: HCPCS | Performed by: NURSE PRACTITIONER

## 2020-02-13 PROCEDURE — 99214 OFFICE O/P EST MOD 30 MIN: CPT | Performed by: NURSE PRACTITIONER

## 2020-02-13 PROCEDURE — 1123F ACP DISCUSS/DSCN MKR DOCD: CPT | Performed by: NURSE PRACTITIONER

## 2020-02-13 PROCEDURE — G8427 DOCREV CUR MEDS BY ELIG CLIN: HCPCS | Performed by: NURSE PRACTITIONER

## 2020-02-13 PROCEDURE — 3023F SPIROM DOC REV: CPT | Performed by: NURSE PRACTITIONER

## 2020-02-13 PROCEDURE — 4040F PNEUMOC VAC/ADMIN/RCVD: CPT | Performed by: NURSE PRACTITIONER

## 2020-02-13 ASSESSMENT — ENCOUNTER SYMPTOMS
CONSTIPATION: 0
COLOR CHANGE: 0
ABDOMINAL PAIN: 0
SHORTNESS OF BREATH: 1
COUGH: 0

## 2020-02-13 NOTE — PROGRESS NOTES
volumes showed  normal total lung capacity of 108% predicted. Diffusion capacity  showed decreased DLCO of 47% predicted. IMPRESSION:  Mild obstructive defect on spirometry with no response to  bronchodilators. Normal lung volumes and moderate decrease in  diffusion capacity. Assessment / Plan:   1. COPD, mild ([facility]  - SOB is stable on controller medications   - Continue Spiriva / Advair  - PFT shows pretty mild disease   - Could consider stopping Advair if symptoms controlled and infection persists    2. Pulmonary nodule  - New nodule on last image  - He has a follow up scan planned for 3/23 at which point nodules and recent CAP can be re-evaluated     3. Pulmonary aspergillosis ([facility]  - He has a f/u appt with Dr. Magnolia Reynolds planned following his March CT  - We will see him in April to assess status though he was instructed to contact us if symptoms worsened in the interval.     Return in about 2 months (around 4/13/2020). RTC sooner if symptoms worsen.    Lorene Suarez MSN APRN-ACNP CCRN

## 2020-03-09 ENCOUNTER — TELEPHONE (OUTPATIENT)
Dept: INFECTIOUS DISEASES | Age: 65
End: 2020-03-09

## 2020-03-19 ENCOUNTER — TELEPHONE (OUTPATIENT)
Dept: INFECTIOUS DISEASES | Age: 65
End: 2020-03-19

## 2020-03-19 RX ORDER — POSACONAZOLE 100 MG/1
200 TABLET, DELAYED RELEASE ORAL
Qty: 60 TABLET | Refills: 2 | Status: SHIPPED | OUTPATIENT
Start: 2020-03-19 | End: 2020-05-28 | Stop reason: SDUPTHER

## 2020-03-19 NOTE — TELEPHONE ENCOUNTER
Talked with the patient. I advised to continue posaconazole for now. Refills ordered. Explained to him the rationale behind postponing the appointment due to [address] outbreak and risks involved.   Patient understands and appreciated the call and concern

## 2020-03-19 NOTE — TELEPHONE ENCOUNTER
[facility] is requesting you call for a peer to peer for patients Ct of the chest.  Call [phone] option 3  Case # is [id number]

## 2020-03-23 ENCOUNTER — HOSPITAL ENCOUNTER (OUTPATIENT)
Dept: CT IMAGING | Age: 65
Discharge: HOME | End: 2020-03-23
Payer: COMMERCIAL

## 2020-03-23 PROCEDURE — 71250 CT THORAX DX C-: CPT

## 2020-03-27 ENCOUNTER — TELEPHONE (OUTPATIENT)
Dept: INFECTIOUS DISEASES | Age: 65
End: 2020-03-27

## 2020-03-30 NOTE — TELEPHONE ENCOUNTER
Please refer patient to endocrine as it is beyond my expertise. Order placed. Alternatively, he may also discuss it with PCP. Thanks.

## 2020-04-09 ENCOUNTER — TELEPHONE (OUTPATIENT)
Dept: INFECTIOUS DISEASES | Age: 65
End: 2020-04-09

## 2020-05-28 ENCOUNTER — VIRTUAL VISIT (OUTPATIENT)
Dept: INFECTIOUS DISEASES | Age: 65
End: 2020-05-28
Payer: COMMERCIAL

## 2020-05-28 VITALS — WEIGHT: 85 LBS | BODY MASS INDEX: 14.51 KG/M2 | HEIGHT: 64 IN

## 2020-05-28 PROCEDURE — 99214 OFFICE O/P EST MOD 30 MIN: CPT | Performed by: INTERNAL MEDICINE

## 2020-05-28 RX ORDER — BENZTROPINE MESYLATE 0.5 MG/1
TABLET ORAL
COMMUNITY
Start: 2020-05-27 | End: 2021-08-23 | Stop reason: SDUPTHER

## 2020-05-28 RX ORDER — POSACONAZOLE 100 MG/1
200 TABLET, DELAYED RELEASE ORAL
Qty: 180 TABLET | Refills: 1 | Status: SHIPPED | OUTPATIENT
Start: 2020-05-28 | End: 2020-07-10 | Stop reason: SDUPTHER

## 2020-05-28 ASSESSMENT — ENCOUNTER SYMPTOMS
CONSTIPATION: 0
EYE REDNESS: 0
NAUSEA: 0
RHINORRHEA: 0
SORE THROAT: 0
TROUBLE SWALLOWING: 0
DIARRHEA: 0
EYE DISCHARGE: 0
WHEEZING: 0
BACK PAIN: 0
SHORTNESS OF BREATH: 0
ABDOMINAL PAIN: 0
COUGH: 0

## 2020-06-01 ENCOUNTER — HOSPITAL ENCOUNTER (OUTPATIENT)
Dept: CT IMAGING | Age: 65
Discharge: HOME | End: 2020-06-01
Payer: COMMERCIAL

## 2020-06-01 PROCEDURE — 71250 CT THORAX DX C-: CPT

## 2020-06-09 ENCOUNTER — VIRTUAL VISIT (OUTPATIENT)
Dept: PULMONOLOGY | Age: 65
End: 2020-06-09
Payer: COMMERCIAL

## 2020-06-09 PROCEDURE — 4040F PNEUMOC VAC/ADMIN/RCVD: CPT | Performed by: INTERNAL MEDICINE

## 2020-06-09 PROCEDURE — 1123F ACP DISCUSS/DSCN MKR DOCD: CPT | Performed by: INTERNAL MEDICINE

## 2020-06-09 PROCEDURE — 99213 OFFICE O/P EST LOW 20 MIN: CPT | Performed by: INTERNAL MEDICINE

## 2020-06-09 PROCEDURE — G8427 DOCREV CUR MEDS BY ELIG CLIN: HCPCS | Performed by: INTERNAL MEDICINE

## 2020-06-09 PROCEDURE — 3017F COLORECTAL CA SCREEN DOC REV: CPT | Performed by: INTERNAL MEDICINE

## 2020-06-16 LAB
ALBUMIN SERPL-MCNC: 4.4 G/DL
ALP BLD-CCNC: 77 U/L
ALT SERPL-CCNC: 25 U/L
ANION GAP SERPL CALCULATED.3IONS-SCNC: NORMAL MMOL/L
AST SERPL-CCNC: 23 U/L
BASOPHILS ABSOLUTE: 0.1 /ΜL
BASOPHILS RELATIVE PERCENT: 1 %
BILIRUB SERPL-MCNC: 0.4 MG/DL (ref 0.1–1.4)
BUN BLDV-MCNC: 13 MG/DL
CALCIUM SERPL-MCNC: 9.5 MG/DL
CHLORIDE BLD-SCNC: 103 MMOL/L
CO2: 24 MMOL/L
CREAT SERPL-MCNC: 0.68 MG/DL
EOSINOPHILS ABSOLUTE: 0.1 /ΜL
EOSINOPHILS RELATIVE PERCENT: 1 %
GFR CALCULATED: NORMAL
GLUCOSE BLD-MCNC: 95 MG/DL
HCT VFR BLD CALC: 49.6 % (ref 41–53)
HEMOGLOBIN: 17.1 G/DL (ref 13.5–17.5)
LYMPHOCYTES ABSOLUTE: 1.4 /ΜL
LYMPHOCYTES RELATIVE PERCENT: 13 %
MCH RBC QN AUTO: 31.3 PG
MCHC RBC AUTO-ENTMCNC: 34.5 G/DL
MCV RBC AUTO: 91 FL
MONOCYTES ABSOLUTE: 1 /ΜL
MONOCYTES RELATIVE PERCENT: 9 %
NEUTROPHILS ABSOLUTE: 8 /ΜL
NEUTROPHILS RELATIVE PERCENT: 73 %
PDW BLD-RTO: 13.7 %
PLATELET # BLD: 252 K/ΜL
PMV BLD AUTO: NORMAL FL
POTASSIUM SERPL-SCNC: 4.2 MMOL/L
RBC # BLD: 5.47 10^6/ΜL
SODIUM BLD-SCNC: 140 MMOL/L
TOTAL PROTEIN: 6.4
WBC # BLD: 10.7 10^3/ML

## 2020-07-09 ENCOUNTER — TELEPHONE (OUTPATIENT)
Dept: INFECTIOUS DISEASES | Age: 65
End: 2020-07-09

## 2020-07-09 NOTE — TELEPHONE ENCOUNTER
Patient went to a [facility] for an eye infection. He was told that they believe it's a reaction to Posaconazole. Patient would like to know if he should go back on the non-generic Noxafil.

## 2020-07-10 RX ORDER — POSACONAZOLE 100 MG/1
200 TABLET, DELAYED RELEASE ORAL
Qty: 180 TABLET | Refills: 1 | Status: SHIPPED | OUTPATIENT
Start: 2020-07-10 | End: 2020-10-08

## 2020-07-10 NOTE — TELEPHONE ENCOUNTER
Spoke with insurance and received PA for brand name Noxafil and for early fill of medication. PA number is [id number] or PA [id number]. Spoke with pharmacy and they reported they will fill early and it is covered. Patients wife Ailene Shone made aware.

## 2020-07-10 NOTE — TELEPHONE ENCOUNTER
I called the patient and discussed with him. Unlike voriconazole, posaconazole typically does not cause any ocular issues. I also reviewed the side effects again on up to date and could not find any data on ocular side effects of posaconazole. The patient tells me that his ophthalmologist at [facility] thought it was likely 1 of the ingredients of the generic version of posaconazole he was taking. The patient tells me that he has tolerated Noxafil before without any problems. I have changed his prescription to Noxafil brand only and have sent it to [facility] at patient's request.  Patient may need help with a preauthorization, if his insurance requires    Also ordering a CBC, CMP and posaconazole level to be done 10 days after he gets back on Noxafil. Please print the lab orders and mail it to the patient as he would like to get the labs done at [facility].    Thanks.       Tyesha Jack MD, MPH  7/10/2020, 10:16 AM  [facility] Infectious Disease   Office: [phone]  Fax: [phone]  Tuesday AM clinic:   [address], [place] [address]  Thursday AM clinic: [address] [facility]

## 2020-07-16 ENCOUNTER — TELEPHONE (OUTPATIENT)
Dept: INFECTIOUS DISEASES | Age: 65
End: 2020-07-16

## 2020-07-17 ENCOUNTER — NURSE TRIAGE (OUTPATIENT)
Dept: OTHER | Facility: CLINIC | Age: 65
End: 2020-07-17

## 2020-07-17 RX ORDER — OMEPRAZOLE 20 MG/1
20 CAPSULE, DELAYED RELEASE ORAL DAILY
Qty: 90 CAPSULE | Refills: 2 | Status: SHIPPED | OUTPATIENT
Start: 2020-07-17 | End: 2021-03-02

## 2020-07-17 NOTE — TELEPHONE ENCOUNTER
schedule appointment. Please do not reply to the triage nurse through this encounter. Any subsequent communication should be directly with the patient. Patient c/o bilateral foot swelling. He reports no pain or redness in the area. Recommended patient see provider. Care advice provided. Warm transfer to service center for physician contact/appointment scheduling.

## 2020-07-17 NOTE — TELEPHONE ENCOUNTER
Spoke with patients wife. She needs assistance getting delivery of Noxafil. Will call [facility] and see what is needed. Patient has not had medication for two weeks.

## 2020-07-18 ENCOUNTER — TELEPHONE (OUTPATIENT)
Dept: INTERNAL MEDICINE CLINIC | Age: 65
End: 2020-07-18

## 2020-07-18 NOTE — TELEPHONE ENCOUNTER
----- Message from Chito Vail sent at 7/17/2020  5:13 PM EDT -----  Subject: Message to Provider    QUESTIONS  Information for Provider? Patients wife called in was sent to nurse triage   because patient is having new mild bilateral foot swelling. Wife has   appointment on Tuesday and would like to see if patient can be seen at   same time. Nurse said that was fine. Please advise.   ---------------------------------------------------------------------------  --------------  CALL BACK INFO  What is the best way for the office to contact you? OK to leave message on   voicemail  Preferred Call Back Phone Number? [phone]  ---------------------------------------------------------------------------  --------------  SCRIPT ANSWERS  Relationship to Patient? Other  Representative Name? Afia Hill  Is the Representative on the appropriate HIPAA document in Epic?  Yes

## 2020-07-21 ENCOUNTER — OFFICE VISIT (OUTPATIENT)
Dept: INTERNAL MEDICINE CLINIC | Age: 65
End: 2020-07-21
Payer: COMMERCIAL

## 2020-07-21 ENCOUNTER — TELEPHONE (OUTPATIENT)
Dept: INFECTIOUS DISEASES | Age: 65
End: 2020-07-21

## 2020-07-21 VITALS
RESPIRATION RATE: 16 BRPM | WEIGHT: 91.4 LBS | BODY MASS INDEX: 15.69 KG/M2 | DIASTOLIC BLOOD PRESSURE: 66 MMHG | OXYGEN SATURATION: 98 % | HEART RATE: 82 BPM | SYSTOLIC BLOOD PRESSURE: 110 MMHG | TEMPERATURE: 97.2 F

## 2020-07-21 PROCEDURE — 99213 OFFICE O/P EST LOW 20 MIN: CPT | Performed by: INTERNAL MEDICINE

## 2020-07-21 PROCEDURE — 4040F PNEUMOC VAC/ADMIN/RCVD: CPT | Performed by: INTERNAL MEDICINE

## 2020-07-21 PROCEDURE — G8419 CALC BMI OUT NRM PARAM NOF/U: HCPCS | Performed by: INTERNAL MEDICINE

## 2020-07-21 PROCEDURE — G8427 DOCREV CUR MEDS BY ELIG CLIN: HCPCS | Performed by: INTERNAL MEDICINE

## 2020-07-21 PROCEDURE — 1036F TOBACCO NON-USER: CPT | Performed by: INTERNAL MEDICINE

## 2020-07-21 PROCEDURE — 1123F ACP DISCUSS/DSCN MKR DOCD: CPT | Performed by: INTERNAL MEDICINE

## 2020-07-21 PROCEDURE — 3017F COLORECTAL CA SCREEN DOC REV: CPT | Performed by: INTERNAL MEDICINE

## 2020-07-21 NOTE — TELEPHONE ENCOUNTER
Spoke to Michael patients spouse and Dr Eduardo Cordon is his opthamologist at [facility] number is [phone].

## 2020-07-21 NOTE — PATIENT INSTRUCTIONS
Patient Education        Leg and Ankle Edema: Care Instructions  Your Care Instructions  Swelling in the legs, ankles, and feet is called edema. It is common after you sit or stand for a while. Long plane flights or car rides often cause swelling in the legs and feet. You may also have swelling if you have to stand for long periods of time at your job. Problems with the veins in the legs (varicose veins) and changes in hormones can also cause swelling. Sometimes the swelling in the ankles and feet is caused by a more serious problem, such as heart failure, infection, blood clots, or liver or kidney disease. Follow-up care is a key part of your treatment and safety. Be sure to make and go to all appointments, and call your doctor if you are having problems. It's also a good idea to know your test results and keep a list of the medicines you take. How can you care for yourself at home? · If your doctor gave you medicine, take it as prescribed. Call your doctor if you think you are having a problem with your medicine. · Whenever you are resting, raise your legs up. Try to keep the swollen area higher than the level of your heart. · Take breaks from standing or sitting in one position. ? Walk around to increase the blood flow in your lower legs. ? Move your feet and ankles often while you stand, or tighten and relax your leg muscles. · Wear support stockings. Put them on in the morning, before swelling gets worse. · Eat a balanced diet. Lose weight if you need to. · Limit the amount of salt (sodium) in your diet. Salt holds fluid in the body and may increase swelling. When should you call for help? [id number] anytime you think you may need emergency care. For example, call if:  · You have symptoms of a blood clot in your lung (called a pulmonary embolism). These may include:  ? Sudden chest pain. ? Trouble breathing. ? Coughing up blood.   Call your doctor now or seek immediate medical care if:  · You have signs of a blood clot, such as:  ? Pain in your calf, back of the knee, thigh, or groin. ? Redness and swelling in your leg or groin. · You have symptoms of infection, such as:  ? Increased pain, swelling, warmth, or redness. ? Red streaks or pus. ? A fever. Watch closely for changes in your health, and be sure to contact your doctor if:  · Your swelling is getting worse. · You have new or worsening pain in your legs. · You do not get better as expected. Where can you learn more? Go to [email or web address] and sign in to your My[employer] account. Enter T515 in the [employer] [place] box to learn more about \"Leg and Ankle Edema: Care Instructions. \"     If you do not have an account, please click on the \"Sign Up Now\" link. Current as of: June 26, 2019               Content Version: 12.5  © 8345-8111 Healthwise, Incorporated. Care instructions adapted under license by [facility] ([facility]). If you have questions about a medical condition or this instruction, always ask your healthcare professional. [place] [address] any warranty or liability for your use of this information.

## 2020-07-21 NOTE — TELEPHONE ENCOUNTER
Patients wife called and reported for the brand name Noxafil costs over 2,000 a month with a PA. Sent information into [email or web address] for the patient assistance program and waiting to see if patient would be covered under them.

## 2020-07-21 NOTE — PROGRESS NOTES
Subjective:      Patient ID: Jayne Ann is a 75 y.o. male     Chief Complaint   Patient presents with    Foot Swelling     foot and ankle swelling       1 week ago      \"back to normal now \"       HPI  Edema  Patient complains of edema in both ankles and feet and both lower legs. The edema has been severe. Onset of symptoms was several weeks ago, and patient reports symptoms have gradually improved since that time. The edema is present in the afternoon, in the evening and at bedtime. The patient states the problem is new. The swelling has been aggravated by increased salt intake. The swelling has been relieved by elevation of involved area. Associated factors include: nothing. Cardiac risk factors include advanced age (older than 54 for men, 72 for women), hypertension, male gender and sedentary lifestyle. Of note patient eats a diet high in canned foods, processed food and deli meat. Posaconazole- generic  - side effects of posoconazole - has recently stopped - Cardiovascular: Thrombophlebitis (intravenous via peripheral venous catheter: 60%), hypertension (8% to 18%), peripheral edema (12% to 16%), lower extremity edema (oral: 15%), hypotension (oral: 14%), tachycardia (oral: 12%)    Review of Systems   Constitutional: Negative. HENT: Negative. Eyes: Negative. Respiratory: Negative. Cardiovascular: Positive for leg swelling. Negative for chest pain and palpitations. Endocrine: Negative. Musculoskeletal: Negative. Skin: Negative. Allergic/Immunologic: Negative. Neurological: Negative. Hematological: Negative. Psychiatric/Behavioral: Negative. All other systems reviewed and are negative.           Allergies   Allergen Reactions    Pcn [Penicillins] Hives     Unknown reaction       Current Outpatient Medications   Medication Sig Dispense Refill    omeprazole (PRILOSEC) 20 MG delayed release capsule TAKE 1 CAPSULE BY MOUTH  DAILY 90 capsule 2    tiotropium (SPIRIVA Heart sounds: Normal heart sounds. No murmur. Pulmonary:      Effort: Pulmonary effort is normal. No respiratory distress. Breath sounds: Rhonchi present. No wheezing. Musculoskeletal: Normal range of motion. Right lower leg: Edema present. Left lower leg: Edema present. Skin:     General: Skin is warm. Capillary Refill: Capillary refill takes less than 2 seconds. Neurological:      Mental Status: He is alert and oriented to person, place, and time. Motor: No abnormal muscle tone. Coordination: Coordination normal.   Psychiatric:         Behavior: Behavior normal.         Thought Content: Thought content normal.         Judgment: Judgment normal.       Assessment/Plan:  Gemma Montalvo was seen today for foot swelling. Diagnoses and all orders for this visit:    Bilateral lower extremity edema  - advised low salt diet  Moderate asthma without complication, unspecified whether persistent  - stable and doing well    Return for AWV in OCTOBER. There are no diagnoses linked to this encounter. Return for AWV in OCTOBER.             Taisha Ellis MD

## 2020-08-16 ASSESSMENT — ENCOUNTER SYMPTOMS
ALLERGIC/IMMUNOLOGIC NEGATIVE: 1
EYES NEGATIVE: 1
RESPIRATORY NEGATIVE: 1

## 2020-09-15 ENCOUNTER — TELEPHONE (OUTPATIENT)
Dept: INFECTIOUS DISEASES | Age: 65
End: 2020-09-15

## 2020-09-15 NOTE — TELEPHONE ENCOUNTER
Patients wife called requesting you call to discuss patient and the Noxafil. Per patients wife, patients eye sight did not get better while on the brand name medication. Patients wife reports that they stopped the medication about a month ago and the eye sight has improved. Patients wife also wanted to discuss if patient needed a CT scan in December. Patient also gave cell number for eye doctor so you could speak with him if need be.   Dr. Torres Record [phone]

## 2020-10-21 ENCOUNTER — OFFICE VISIT (OUTPATIENT)
Dept: INTERNAL MEDICINE CLINIC | Age: 65
End: 2020-10-21
Payer: COMMERCIAL

## 2020-10-21 VITALS
HEART RATE: 94 BPM | HEIGHT: 63 IN | SYSTOLIC BLOOD PRESSURE: 115 MMHG | WEIGHT: 86.8 LBS | BODY MASS INDEX: 15.38 KG/M2 | OXYGEN SATURATION: 94 % | DIASTOLIC BLOOD PRESSURE: 80 MMHG

## 2020-10-21 PROCEDURE — G0438 PPPS, INITIAL VISIT: HCPCS | Performed by: INTERNAL MEDICINE

## 2020-10-21 PROCEDURE — G0008 ADMIN INFLUENZA VIRUS VAC: HCPCS | Performed by: INTERNAL MEDICINE

## 2020-10-21 PROCEDURE — G0009 ADMIN PNEUMOCOCCAL VACCINE: HCPCS | Performed by: INTERNAL MEDICINE

## 2020-10-21 PROCEDURE — 1123F ACP DISCUSS/DSCN MKR DOCD: CPT | Performed by: INTERNAL MEDICINE

## 2020-10-21 PROCEDURE — G8484 FLU IMMUNIZE NO ADMIN: HCPCS | Performed by: INTERNAL MEDICINE

## 2020-10-21 PROCEDURE — 90694 VACC AIIV4 NO PRSRV 0.5ML IM: CPT | Performed by: INTERNAL MEDICINE

## 2020-10-21 PROCEDURE — 3017F COLORECTAL CA SCREEN DOC REV: CPT | Performed by: INTERNAL MEDICINE

## 2020-10-21 PROCEDURE — 4040F PNEUMOC VAC/ADMIN/RCVD: CPT | Performed by: INTERNAL MEDICINE

## 2020-10-21 PROCEDURE — 90732 PPSV23 VACC 2 YRS+ SUBQ/IM: CPT | Performed by: INTERNAL MEDICINE

## 2020-10-21 ASSESSMENT — PATIENT HEALTH QUESTIONNAIRE - PHQ9
SUM OF ALL RESPONSES TO PHQ QUESTIONS 1-9: 0
SUM OF ALL RESPONSES TO PHQ QUESTIONS 1-9: 0
SUM OF ALL RESPONSES TO PHQ9 QUESTIONS 1 & 2: 0
2. FEELING DOWN, DEPRESSED OR HOPELESS: 0
SUM OF ALL RESPONSES TO PHQ QUESTIONS 1-9: 0
1. LITTLE INTEREST OR PLEASURE IN DOING THINGS: 0

## 2020-10-21 NOTE — PROGRESS NOTES
Medicare Annual Wellness Visit  Name: Cinthia Larson Date: 10/21/2020   MRN: [id number] Sex: Male   Age: 75 y.o. Ethnicity: Non-/Non    : 1955 Race: Blaine Kanner is here for Medicare AWV    Screenings for behavioral, psychosocial and functional/safety risks, and cognitive dysfunction are all negative except as indicated below. These results, as well as other patient data from the [address] form, are documented in Flowsheets linked to this Encounter. Allergies   Allergen Reactions    Pcn [Penicillins] Hives     Unknown reaction       Prior to Visit Medications    Medication Sig Taking? Authorizing Provider   omeprazole (PRILOSEC) 20 MG delayed release capsule TAKE 1 CAPSULE BY MOUTH  DAILY Yes Sonido Graff MD   tiotropium (SPIRIVA RESPIMAT) 2.5 MCG/ACT AERS inhaler USE 2 INHALATIONS BY MOUTH  ONCE DAILY Yes Bertha Nesbitt MD   albuterol sulfate  (90 Base) MCG/ACT inhaler INHALE 2 PUFFS BY MOUTH EVERY 6 HOURS AS NEEDED FOR WHEEZING Yes Bertha Nesbitt MD   benztropine (COGENTIN) 0.5 MG tablet  Yes Historical Provider, MD   paliperidone (INVEGA) 6 MG ER tablet Take 6 mg by mouth every morning.    Yes Historical Provider, MD   fluticasone-salmeterol (ADVAIR HFA) 230-21 MCG/ACT inhaler USE 2 PUFFS BY MOUTH TWO  TIMES DAILY  Patient not taking: Reported on 2020  Bertha Nesbitt MD       Past Medical History:   Diagnosis Date    Arthritis     Aspergillus ([facility] 2018    resp culture    Bipolar 1 disorder ([facility]     Chronic bronchitis, obstructive (HCC)     COPD (chronic obstructive pulmonary disease) ([facility]     GERD (gastroesophageal reflux disease)     Darleen's        Past Surgical History:   Procedure Laterality Date    BRONCHOSCOPY N/A 2018    BRONCHOSCOPY N/A 2019    BRONCHOSCOPY WITH LAVAGE performed by Bertha Nesbitt MD at [address], COLON, DIAGNOSTIC      KNEE ARTHROSCOPY auscultation bilaterally- no wheezes, rales or rhonchi, normal air movement, no respiratory distress  Cardiovascular: normal rate, regular rhythm, normal S1 and S2, no murmurs, rubs, clicks, or gallops, distal pulses intact, no carotid bruits  Abdomen: soft, non-tender, non-distended, normal bowel sounds, no masses or organomegaly  Extremities: no cyanosis, clubbing or edema  Musculoskeletal: normal range of motion, no joint swelling, deformity or tenderness  Neurologic: reflexes normal and symmetric, no cranial nerve deficit, gait, coordination and speech normal    Patient's complete Health Risk Assessment and screening values have been reviewed and are found in Flowsheets. The following problems were reviewed today and where indicated follow up appointments were made and/or referrals ordered. Positive Risk Factor Screenings with Interventions:     General Health and ACP:  General  In general, how would you say your health is?: Good  In the past 7 days, have you experienced any of the following?  New or Increased Pain, New or Increased Fatigue, Loneliness, Social Isolation, Stress or Anger?: None of These  Do you get the social and emotional support that you need?: Yes  Do you have a Living Will?: (!) No  Advance Directives     Power of [facility] Will ACP-Advance Directive ACP-Power of     Not on File Not on File Filed [address] [place] Risk Interventions:  · No Living Will: Advance Care Planning addressed with patient today    Health Habits/Nutrition:  Health Habits/Nutrition  Do you exercise for at least 20 minutes 2-3 times per week?: (!) No  Have you lost any weight without trying in the past 3 months?: (!) Yes  Do you eat fewer than 2 meals per day?: No  Have you seen a dentist within the past year?: (!) No  Body mass index: (!) 15.37  Health Habits/Nutrition Interventions:  · Nutritional issues:  educational materials for healthy, well-balanced diet provided  · Dental exam overdue: patient encouraged to make appointment with his/her dentist    Hearing/Vision:  No exam data present  Hearing/Vision  Do you or your family notice any trouble with your hearing?: No  Do you have difficulty driving, watching TV, or doing any of your daily activities because of your eyesight?: No  Have you had an eye exam within the past year?: (!) No  Hearing/Vision Interventions:  · Vision concerns:  patient goes to [place] [address] and last went about 1.5 months ago    Personalized Preventive Plan   Current Health Maintenance Status  Immunization History   Administered Date(s) Administered    Influenza, Quadv, IM, PF (6 mo and older Fluzone, Flulaval, Fluarix, and 3 yrs and older Afluria) 10/15/2018    Influenza, Triv, inactivated, subunit, adjuvanted, IM (Fluad 72 yrs and older) 10/21/2019    Pneumococcal Conjugate 13-valent ([id number]) 01/30/2018    Pneumococcal Polysaccharide (Itocbgeve63) 10/15/2018, 10/21/2019    Tdap (Boostrix, Adacel) 11/28/2018        Health Maintenance   Topic Date Due    Shingles Vaccine (1 of 2) 11/29/2003    Flu vaccine (1) 09/01/2020    Low dose CT lung screening  06/01/2021    Lipid screen  12/12/2024    Colon cancer screen colonoscopy  07/12/2026    DTaP/Tdap/Td vaccine (2 - Td) 11/28/2028    Pneumococcal 65+ years Vaccine  Completed    AAA screen  Completed    Hepatitis C screen  Completed    Hepatitis A vaccine  Aged Out    Hepatitis B vaccine  Aged Out    Hib vaccine  Aged Out    Meningococcal (ACWY) vaccine  Aged Out     Recommendations for [employer] Due: see orders and patient instructions/AVS.  . Recommended screening schedule for the next 5-10 years is provided to the patient in written form: see Patient Instructions/AVS.    There are no diagnoses linked to this encounter.

## 2020-10-21 NOTE — PATIENT INSTRUCTIONS
Personalized Preventive Plan for Bouchra Galvez - 10/21/2020  Medicare offers a range of preventive health benefits. Some of the tests and screenings are paid in full while other may be subject to a deductible, co-insurance, and/or copay. Some of these benefits include a comprehensive review of your medical history including lifestyle, illnesses that may run in your family, and various assessments and screenings as appropriate. After reviewing your medical record and screening and assessments performed today your provider may have ordered immunizations, labs, imaging, and/or referrals for you. A list of these orders (if applicable) as well as your Preventive Care list are included within your After Visit Summary for your review. Other Preventive Recommendations:    · A preventive eye exam performed by an eye specialist is recommended every 1-2 years to screen for glaucoma; cataracts, macular degeneration, and other eye disorders. · A preventive dental visit is recommended every 6 months. · Try to get at least 150 minutes of exercise per week or 10,000 steps per day on a pedometer . · Order or download the FREE \"Exercise & Physical Activity: Your Everyday Guide\" from [employer]. Call 1-327.899.6452 or search [employer] online. · You need 2531-3441 mg of calcium and 4197-0610 IU of vitamin D per day. It is possible to meet your calcium requirement with diet alone, but a vitamin D supplement is usually necessary to meet this goal.  · When exposed to the sun, use a sunscreen that protects against both UVA and UVB radiation with an SPF of 30 or greater. Reapply every 2 to 3 hours or after sweating, drying off with a towel, or swimming. · Always wear a seat belt when traveling in a car. Always wear a helmet when riding a bicycle or motorcycle.

## 2020-12-15 ENCOUNTER — HOSPITAL ENCOUNTER (OUTPATIENT)
Dept: CT IMAGING | Age: 65
Discharge: HOME | End: 2020-12-15
Payer: COMMERCIAL

## 2020-12-15 PROCEDURE — 71250 CT THORAX DX C-: CPT

## 2020-12-18 ENCOUNTER — OFFICE VISIT (OUTPATIENT)
Dept: PULMONOLOGY | Age: 65
End: 2020-12-18
Payer: COMMERCIAL

## 2020-12-18 VITALS — HEART RATE: 74 BPM | OXYGEN SATURATION: 96 %

## 2020-12-18 PROCEDURE — 1123F ACP DISCUSS/DSCN MKR DOCD: CPT | Performed by: INTERNAL MEDICINE

## 2020-12-18 PROCEDURE — G8484 FLU IMMUNIZE NO ADMIN: HCPCS | Performed by: INTERNAL MEDICINE

## 2020-12-18 PROCEDURE — G8419 CALC BMI OUT NRM PARAM NOF/U: HCPCS | Performed by: INTERNAL MEDICINE

## 2020-12-18 PROCEDURE — 3017F COLORECTAL CA SCREEN DOC REV: CPT | Performed by: INTERNAL MEDICINE

## 2020-12-18 PROCEDURE — 99214 OFFICE O/P EST MOD 30 MIN: CPT | Performed by: INTERNAL MEDICINE

## 2020-12-18 PROCEDURE — G8926 SPIRO NO PERF OR DOC: HCPCS | Performed by: INTERNAL MEDICINE

## 2020-12-18 PROCEDURE — 3023F SPIROM DOC REV: CPT | Performed by: INTERNAL MEDICINE

## 2020-12-18 PROCEDURE — 1036F TOBACCO NON-USER: CPT | Performed by: INTERNAL MEDICINE

## 2020-12-18 PROCEDURE — G8427 DOCREV CUR MEDS BY ELIG CLIN: HCPCS | Performed by: INTERNAL MEDICINE

## 2020-12-18 PROCEDURE — 4040F PNEUMOC VAC/ADMIN/RCVD: CPT | Performed by: INTERNAL MEDICINE

## 2020-12-18 NOTE — PROGRESS NOTES
[facility]  Follow Up Note  MD Lebron Jessica   YOB: 1955    Date of Visit:  12/18/2020    Assessment/Plan:  1. COPD, mild ([address]  Stable  PFT 10/17:  INTERPRETATION:  Spirometry attempts were acceptable and reproducible. FVC was normal at 3.28 L, 88% predicted and decreased FEV1 of 1.99 L,  71% predicted. FEV1/FVC ratio was decreased at 61%. No response to  bronchodilators demonstrated on spirometry. Lung volumes showed  normal total lung capacity of 108% predicted. Diffusion capacity  showed decreased DLCO of 47% predicted.     IMPRESSION:  Mild obstructive defect on spirometry with no response to  bronchodilators. Normal lung volumes and moderate decrease in  diffusion capacity. Spiriva  Albuterol    2. Centrilobular emphysema (HCC)  Stable  Noted on CT imaging    3. Bronchiectasis without complication (HCC)  Stable  Also noted on CT imaging    4. Pulmonary nodule  Worse  CT imaging shows increasing size and density of a lesion in the right lower lobe compared to June 2020. Right upper lobe lesion is approximately the same. Discussed options including continued observation, PET scan and biopsy  Patient is agreeable to PET scan  Arrange for PET scan in the near future and follow-up after imaging is complete    5. Pulmonary aspergillosis  Discussed options including continued CT surveillance, continued medication therapy and repeat bronchoscopy with BAL  Patient and his wife prefer bronchoscopy to see if the organism has cleared. I described the procedure and its potential risks including medication reaction, pneumothorax, bleeding and death. He tolerated the procedure well previously and I think this is a low risk procedure for him. We will proceed with scheduling the procedure    Chief Complaint   Patient presents with    Shortness of Breath     6 month f.u.  Had repeat imaging this past Tuesday       HPI  The patient presents with a chief complaint of moderate shortness of breath related to mild COPD of many years duration. He has mild associated cough. Exertion is a modifying factor. Has a history of pulmonary aspergillosis and has been treated for this. We have been following a pulmonary nodule on CT imaging. Current imaging shows a new or enlarging right lower lobe pulmonary nodule. Review of Systems  As reviewed in HPI    History  I have reviewed past medical, surgical, social and family history. This is documented elsewhere in the medical record. Physical Exam:  Well developed, well nourished  Alert and oriented  Sclera is clear  No cervical adenopathy  No JVD. Chest examination is clear. Cardiac examination reveals regular rate and rhythm without murmur, gallop or rub. The abdomen is soft, nontender and nondistended. There is no clubbing, cyanosis or edema of the extremities. There is no obvious skin rash. No focal neuro deficicts  Normal mood and affect    Allergies   Allergen Reactions    Pcn [Penicillins] Hives     Unknown reaction     Prior to Visit Medications    Medication Sig Taking? Authorizing Provider   omeprazole (PRILOSEC) 20 MG delayed release capsule TAKE 1 CAPSULE BY MOUTH  DAILY  Donte Guillen MD   tiotropium (SPIRIVA RESPIMAT) 2.5 MCG/ACT AERS inhaler USE 2 INHALATIONS BY MOUTH  ONCE DAILY  Tasia Prabhakar MD   albuterol sulfate  (90 Base) MCG/ACT inhaler INHALE 2 PUFFS BY MOUTH EVERY 6 HOURS AS NEEDED FOR WHEEZING  Tasia Prabhakar MD   benztropine (COGENTIN) 0.5 MG tablet   Historical Provider, MD   paliperidone (INVEGA) 6 MG ER tablet Take 6 mg by mouth every morning. Historical Provider, MD       Vitals:    12/18/20 1527   Pulse: 74   SpO2: 96%     There is no height or weight on file to calculate BMI.      Wt Readings from Last 3 Encounters:   10/21/20 86 lb 12.8 oz (39.4 kg)   07/21/20 91 lb 6.4 oz (41.5 kg)   05/28/20 85 lb (38.6 kg)     BP Readings from Last 3 Encounters:   10/21/20 115/80   20 110/66   20 137/85        Social History     Tobacco Use   Smoking Status Former Smoker    Packs/day: 2.00    Years: 35.00    Pack years: 70.00    Types: Cigarettes    Quit date: 2013    Years since quittin.9   Smokeless Tobacco Never Used

## 2020-12-31 ENCOUNTER — TELEPHONE (OUTPATIENT)
Dept: PULMONOLOGY | Age: 65
End: 2020-12-31

## 2020-12-31 NOTE — TELEPHONE ENCOUNTER
I spoke with the pt and when things change with his insurance he will call back and reschedule his PET/CT and office visit

## 2020-12-31 NOTE — TELEPHONE ENCOUNTER
Pt's wife Mary Lou Nair called in to let us know that they cx'd Pt's PET that was scheduled for 1/6. Mary Lou Nair has lost her insurance and they can not afford what Medicare will not pay.     Mary Lou Nair # [phone]

## 2021-01-07 ENCOUNTER — TELEPHONE (OUTPATIENT)
Dept: INFECTIOUS DISEASES | Age: 66
End: 2021-01-07

## 2021-01-07 NOTE — TELEPHONE ENCOUNTER
I think the PET scan was ordered by a different physician. He still will need to do  follow-up with us.

## 2021-01-12 ENCOUNTER — VIRTUAL VISIT (OUTPATIENT)
Dept: INFECTIOUS DISEASES | Age: 66
End: 2021-01-12
Payer: COMMERCIAL

## 2021-01-12 DIAGNOSIS — D80.1 HYPOGAMMAGLOBULINEMIA (HCC): ICD-10-CM

## 2021-01-12 DIAGNOSIS — J43.2 CENTRILOBULAR EMPHYSEMA (HCC): ICD-10-CM

## 2021-01-12 DIAGNOSIS — R19.7 DIARRHEA, UNSPECIFIED TYPE: ICD-10-CM

## 2021-01-12 DIAGNOSIS — B44.1 PULMONARY ASPERGILLOSIS (HCC): ICD-10-CM

## 2021-01-12 DIAGNOSIS — R47.1 DYSARTHRIA: ICD-10-CM

## 2021-01-12 DIAGNOSIS — R91.8 LUNG NODULES: Primary | ICD-10-CM

## 2021-01-12 DIAGNOSIS — J44.9 COPD, MILD (HCC): ICD-10-CM

## 2021-01-12 PROCEDURE — 1123F ACP DISCUSS/DSCN MKR DOCD: CPT | Performed by: INTERNAL MEDICINE

## 2021-01-12 PROCEDURE — G8427 DOCREV CUR MEDS BY ELIG CLIN: HCPCS | Performed by: INTERNAL MEDICINE

## 2021-01-12 PROCEDURE — 99213 OFFICE O/P EST LOW 20 MIN: CPT | Performed by: INTERNAL MEDICINE

## 2021-01-12 PROCEDURE — 4040F PNEUMOC VAC/ADMIN/RCVD: CPT | Performed by: INTERNAL MEDICINE

## 2021-01-12 PROCEDURE — 3017F COLORECTAL CA SCREEN DOC REV: CPT | Performed by: INTERNAL MEDICINE

## 2021-01-12 ASSESSMENT — ENCOUNTER SYMPTOMS
RHINORRHEA: 0
EYE REDNESS: 0
TROUBLE SWALLOWING: 0
EYE DISCHARGE: 0
WHEEZING: 0
DIARRHEA: 0
NAUSEA: 0
COUGH: 0
BACK PAIN: 0
ABDOMINAL PAIN: 0
SORE THROAT: 0
CONSTIPATION: 0
SHORTNESS OF BREATH: 0

## 2021-01-12 NOTE — PROGRESS NOTES
Infectious Diseases Oupatient Follow-up Note        Jamison Hudson is a 77 y.o. male being evaluated by a Virtual Visit encounter to address concerns as mentioned above. A caregiver was present when appropriate. Due to this being a TeleHealth encounter (During XLDGI-75 public health emergency), evaluation of the following organ systems was limited: Vitals/Constitutional/EENT/Resp/CV/GI//MS/Neuro/Skin/Heme-Lymph-Imm. Pursuant to the emergency declaration under the [address] [place] [address], [address] [place] [address] and the [employer] Act, this Virtual Visit was conducted with patient's (and/or legal guardian's) consent, to reduce the patient's risk of exposure to COVID-19 and provide necessary medical care. The patient (and/or legal guardian) has also been advised to contact this office for worsening conditions or problems, and seek emergency medical treatment and/or call 911 if deemed necessary. Services were provided through an audio and/or video synchronous discussion virtually to substitute for in-person clinic visit. Patient and provider were located at their individual homes. --Rhea Jimenez MD on 1/12/2021 at 2:18 PM    An electronic signature was used to authenticate this note. Reason for Visit:              Follow-up for pulmonary aspergillosis  Primary Care Physician:  Hany Ricci MD  History Obtained From:   Patient , Medical Records     CHIEF COMPLAINT:    Chief Complaint   Patient presents with    Follow-up     Pulmonary aspergillosis (       INTERVAL HISTORY: Jamison Hudson is a 77 y.o. pleasant male patient, who had a virtual visit with me today for follow-up. History was obtained from chart review and the patient. The patient had a virtual visit with me today for above mentioned complaints. Current Outpatient Medications   Medication Sig Dispense Refill    omeprazole (PRILOSEC) 20 MG delayed release capsule TAKE 1 CAPSULE BY MOUTH  DAILY 90 capsule 2    tiotropium (SPIRIVA RESPIMAT) 2.5 MCG/ACT AERS inhaler USE 2 INHALATIONS BY MOUTH  ONCE DAILY 3 Inhaler 3    albuterol sulfate  (90 Base) MCG/ACT inhaler INHALE 2 PUFFS BY MOUTH EVERY 6 HOURS AS NEEDED FOR WHEEZING 18 g 11    benztropine (COGENTIN) 0.5 MG tablet       paliperidone (INVEGA) 6 MG ER tablet Take 6 mg by mouth every morning. No current facility-administered medications for this visit. Family history: All family history was reviewed by me today    Family History   Problem Relation Age of Onset    Arthritis Mother     Glaucoma Mother     High Blood Pressure Mother     Heart Disease Father         age [de-identified] COPD Father         emphysema    COPD Brother        No family history of immunocompromising or autoimmune conditions. No h/o TB in in family or contacts    REVIEW OF SYSTEMS:      Review of Systems   Constitutional: Negative for chills, diaphoresis and fever. HENT: Negative for ear discharge, ear pain, rhinorrhea, sore throat and trouble swallowing. Eyes: Negative for discharge and redness. Respiratory: Negative for cough, shortness of breath and wheezing. Cardiovascular: Negative for chest pain and leg swelling. Gastrointestinal: Negative for abdominal pain, constipation, diarrhea and nausea. Endocrine: Negative for polyuria. Genitourinary: Negative for dysuria, flank pain, frequency, hematuria and urgency. Musculoskeletal: Negative for back pain and myalgias. Skin: Negative for rash. Neurological: Negative for dizziness, seizures and headaches. Hematological: Does not bruise/bleed easily. Psychiatric/Behavioral: Negative for hallucinations and suicidal ideas. All other systems reviewed and are negative. PHYSICAL EXAM:      There were no vitals filed for this visit. Physical Exam       No physical exam could be conducted today due to the virtual nature of the visit      DATA:  All available lab data wasreviewed by me during this visit    CBC:  Lab Results   Component Value Date    WBC 10.7 06/16/2020    HGB 17.1 06/16/2020    HCT 49.6 06/16/2020    MCV 91 06/16/2020     06/16/2020    LYMPHOPCT 13 06/16/2020    RBC 5.47 06/16/2020    MCH 31.3 06/16/2020    MCHC 34.5 06/16/2020    RDW 13.7 06/16/2020       Last BMP:  Lab Results   Component Value Date     06/16/2020    K 4.2 06/16/2020    K 4.0 01/09/2020     06/16/2020    CO2 24 06/16/2020    BUN 13 06/16/2020    CREATININE 0.68 06/16/2020    GLUCOSE 95 06/16/2020    CALCIUM 9.5 06/16/2020        Hepatic Function Panel:  Lab Results   Component Value Date    ALKPHOS 77 06/16/2020    ALT 25 06/16/2020    AST 23 06/16/2020    PROT 6.7 01/09/2020    PROT 6.6 08/27/2012    BILITOT 0.4 06/16/2020    BILIDIR 0.11 08/27/2012    IBILI 0.2 08/27/2012    LABALBU 4.4 06/16/2020       Last CK: No results found for: CKTOTAL    Last ESR:  No results found for: SEDRATE    Last CRP:  No results found for: CRP      Imaging: All pertinent images and reports for the current visit were reviewed by me during this visit. Outside records:    Labs, Microbiology,Radiology and pertinent results from Care everywhere, if available, were reviewed as a part of the consultation.     Problem list:       Patient Active Problem List   Diagnosis Code    Bronchitis J40    Bipolar affective disorder ([facility] F31.9    Smoker F17.200    Hypoxemia R09.02    Weight loss R63.4    Tobacco use disorder F17.200    Pulmonary emphysema (HCC) J43.9    Pulmonary nodule R91.1    Granulomatous lung disease (HCC) J84.10    Asthma J45.909    Imbalance R26.89    Transient cerebral ischemia G45.9    Slurred speech R47.81    Gastroesophageal reflux disease without esophagitis K21.9    SIRS (systemic inflammatory response syndrome) ([facility]) R65.10  Dysarthria R47.1    Dizziness R42    Gait abnormality R26.9    Aphasia R47.01    Nausea vomiting and diarrhea R11.2, R19.7    Blood culture positive for microorganism R79.89    Tobacco abuse counseling Z71.6    Diarrhea R19.7    Abnormal CT of the chest R93.89    COPD, mild ([facility]) J44.9    Bronchiectasis with acute lower respiratory infection ([address] J47.0    Pulmonary aspergillosis ([facility]) B44.1    Centrilobular emphysema ([facility]) J43.2    CAP (community acquired pneumonia) due to Chlamydia species J16.0    Pneumonia due to organism J18.9    Acute respiratory failure with hypoxia ([facility]) J96.01    Emaciation ([facility]) E43    Bandemia D72.825    Lung nodules R91.8    Hypogammaglobulinemia ([facility]) D80.1    Encounter for medication counseling Z71.89       Microbiology:       All available micro data was reviewed by me during this visit  Allergies and immunizations:       Known drug Allergies: All allergies data was reviewed by me during this visit  Pcn [penicillins]    Immunizations: All immunizations were reviewed byme in detail. Immunization History   Administered Date(s) Administered    Influenza, Quadv, IM, PF (6 mo and older Fluzone, Flulaval, Fluarix, and 3 yrs and older Afluria) 10/15/2018    Influenza, Quadv, adjuvanted, 65 yrs +, IM, PF (Fluad) 10/21/2020    Influenza, Triv, inactivated, subunit, adjuvanted, IM (Fluad 65 yrs and older) 10/21/2019    Pneumococcal Conjugate 13-valent ([facility]) 01/30/2018    Pneumococcal Polysaccharide ([id number]) 10/15/2018, 10/21/2019, 10/21/2020    Tdap (Boostrix, Adacel) 11/28/2018       SocialHistory:  All social and epidemiologic history was reviewed and updated be me in detail today.     Social History     Socioeconomic History    Marital status:      Spouse name: Not on file    Number of children: 1    Years of education: Not on file    Highest education level: Not on file   Occupational History    Not on file   Social Needs  Financial resource strain: Not on file    Food insecurity     Worry: Not on file     Inability: Not on file   [employer] needs     Medical: Not on file     Non-medical: Not on file   Tobacco Use    Smoking status: Former Smoker     Packs/day: 2.00     Years: 35.00     Pack years: 70.00     Types: Cigarettes     Quit date: 2013     Years since quittin.0    Smokeless tobacco: Never Used   Substance and Sexual Activity    Alcohol use: Not Currently     Alcohol/week: 0.0 standard drinks     Comment: 24 yrs sober    Drug use: No    Sexual activity: Not Currently   Lifestyle    Physical activity     Days per week: Not on file     Minutes per session: Not on file    Stress: Not on file   Relationships    Social connections     Talks on phone: Not on file     Gets together: Not on file     Attends Yarsanism service: Not on file     Active member of club or organization: Not on file     Attends meetings of clubs or organizations: Not on file     Relationship status: Not on file    Intimate partner violence     Fear of current or ex partner: Not on file     Emotionally abused: Not on file     Physically abused: Not on file     Forced sexual activity: Not on file   Other Topics Concern    Not on file   Social History Narrative    Lives home with wife. IMPRESSION:    The patient is a 77 y.o. old male who  has a past medical history of Arthritis, Aspergillus ([address] (2018), Bipolar 1 disorder ([address] Chronic bronchitis, obstructive ([address] COPD (chronic obstructive pulmonary disease) ([address] GERD (gastroesophageal reflux disease), and Tourette's. was seen today for following problems:    1. Lung nodules    2. Hypogammaglobulinemia ([address]    3. Pulmonary aspergillosis ([address]    4. COPD, mild ([address]    5. Diarrhea, unspecified type    6. Dysarthria    7.  Centrilobular emphysema ([address]        Discussion: I had a long discussion with the patient and his wife today about the CT scan results of the chest from 12/15/2020. Since patient had come off antifungal medication in September 2020, no new lesions seen in the right lower lobe of the lung may very well be pulmonary aspergillosis lesion among other differentials. I advised that one option may be to ask pulmonology for a repeat bronchoscopy and possible transbronchial biopsy of the new lesion for cultures and histopathology to find out the cause or other option may be to start him back on antibiotic therapy and repeat a CT scan of the chest 3 to 6 months after that to see if the lesion is decreasing in size or resolves. The patient is not interested to do either at this time. One of the reasons behind that according to the patient's wife is financial.  The patient's wife has been laid off and they currently do not have any income. They are dependent on Medicare part A and part B.   Also, they are concerned about the side effects of antifungal medication as he had ocular side effects with generic version of posaconazole, and he stopped it in September 2020 at the recommendation of his ophthalmologist.        RECOMMENDATIONS:      Diagnostic Workup:    · No labs  at this point  · Continue to follow fever curve  at home      Antimicrobials:    · I offered the patient to have my office staff look into the patient drug assistance programs for Noxafil or Cresemba to see if patient would qualify for any of that  · The patient wanted me to hold off on looking into any of the drug assistance programs at this time and he wanted to give it more thought before going back on the antifungal medications  · I reviewed all the information with him to make sure that he has understood all of the information correctly · We will honor patient's wishes and will wait for his decision about going back on antifungal medication versus a diagnostic bronchoscopy. The patient told me that he will call my office and let me know, whenever he has made up his mind  · Plan to do a follow-up on a as needed basis based on what patient decides  · Discussed with patient the strategies to stay safe from [place] 19 exposures including safe social distancing, frequent and proper hand hygiene when outside and using 3 layered mouth and nose coverings/facemasks when outside their home. Patient education and counseling:    · The patient was educated in detail about the side-effects of various antibiotics and things to watch for like new rashes, lip swelling, severereaction, worsening diarrhea, break through fever etc.  · Patient was instructed to stop antibiotics and call our office if these problems were to occur, or go to nearest ER ifexperiencing severe symptoms. · Discussed patient's condition and what to expect. All of the patient's questions were addressed in a satisfactory manner and patient verbalizedunderstanding all instructions. Smoking cessation/ Tobacco use disorder counseling:    I have counseled the patient extensively about risks of smoking and have encouraged the patient to maintain a healthy smoke-free lifestyle. Information was given about various smoking cessation programs and their websites like www.smokefree.gov, [place]. quitlogix. org as well as help lines like 1-800-QUIT-NOW and 1-800-LUNG-[place]. Follow-up:    · Follow-up with me in ID clinic or through video visit as needed    TIME SPENT TODAY:    - Spent over 21 minutes on visit (including interval history,  review of data including labs,cultures, imaging, development and implementation of treatment plan and coordination of care). - Over 50% of time spent with pt counseling andeducation. Please note that this chart was generated using Dragon dictation software. Although every effort was made to ensure the accuracy of this automated transcription, some errors in transcription may have occurred inadvertently. If you may need any clarification, please do not hesitate to contact me through EP[employer] or at the phone number provided below with my electronic signature. Any pictures or media included in this note were obtained after taking informed verbal consent from the patient and with their approval to include those in the patient's medical record. Thankyou for involving me inthe care of your patient. If you have any additional questions, please do not hesitate to contact me.     Ronel Bates MD, MPH  1/12/21, 1:59 PM EST   [facility]   [address] [place] [address], [address] [place] [address]  Office: [phone]  Fax: [phone]  Clinic days:  Tuesday & Thursday

## 2021-03-02 DIAGNOSIS — K21.9 GASTROESOPHAGEAL REFLUX DISEASE WITHOUT ESOPHAGITIS: ICD-10-CM

## 2021-03-02 RX ORDER — OMEPRAZOLE 20 MG/1
20 CAPSULE, DELAYED RELEASE ORAL DAILY
Qty: 90 CAPSULE | Refills: 0 | Status: SHIPPED | OUTPATIENT
Start: 2021-03-02 | End: 2021-06-22 | Stop reason: SDUPTHER

## 2021-03-02 NOTE — TELEPHONE ENCOUNTER
Recent Visits  Date Type Provider Dept   10/21/20 Office Visit Boby Campbell MD [facility]   07/21/20 Office Visit Boby Campbell MD [facility]   10/21/19 Office Visit Boby Campbell MD [facility]   Showing recent visits within past 540 days with a meds authorizing provider and meeting all other requirements     Future Appointments  No visits were found meeting these conditions.    Showing future appointments within next 150 days with a meds authorizing provider and meeting all other requirements      Last script written 7/17/20 #90 capsule refills 2

## 2021-06-01 ENCOUNTER — TELEPHONE (OUTPATIENT)
Dept: PULMONOLOGY | Age: 66
End: 2021-06-01

## 2021-06-01 NOTE — TELEPHONE ENCOUNTER
Spoke with Mrs Dorothea Che and explained to her that the PET Scan has the CAT Scan is included. This is what Dr Jj Jamison wanted done back in December but understandably not viable due to loss of insurance. Mrs Dorothea Che stated she understood and would like to go ahead with PET Scan. Pt scheduled for 6-7-21 arrival 12:30 pm with scan starting at 1:00 Informed pt and spouse once Dr Jj Jamison obtains results will call them and let them know what Dr Jj Jamison would like for pt  to do next. They stated they understood.

## 2021-06-01 NOTE — TELEPHONE ENCOUNTER
Pt wife called and said pt needs a ct and to be called once that order is placed      Call [phone]

## 2021-06-01 NOTE — TELEPHONE ENCOUNTER
Pt called and said he is waiting for Richard Travis to tell him when to get a PET CT done, pt has an appt scheduled for 6/16/21 but wants to cancel it until he gets his scan done    Call pt at [phone]

## 2021-06-02 ENCOUNTER — HOSPITAL ENCOUNTER (OUTPATIENT)
Age: 66
End: 2021-06-02
Payer: COMMERCIAL

## 2021-06-07 ENCOUNTER — HOSPITAL ENCOUNTER (OUTPATIENT)
Dept: PET IMAGING | Age: 66
Discharge: HOME | End: 2021-06-07
Payer: COMMERCIAL

## 2021-06-07 VITALS — BODY MASS INDEX: 15.36 KG/M2 | HEIGHT: 64 IN | WEIGHT: 90 LBS

## 2021-06-07 DIAGNOSIS — R91.1 LUNG NODULE: ICD-10-CM

## 2021-06-07 PROCEDURE — 78815 PET IMAGE W/CT SKULL-THIGH: CPT

## 2021-06-07 PROCEDURE — 3430000000 HC RX DIAGNOSTIC RADIOPHARMACEUTICAL: Performed by: INTERNAL MEDICINE

## 2021-06-07 PROCEDURE — A9552 F18 FDG: HCPCS | Performed by: INTERNAL MEDICINE

## 2021-06-07 RX ORDER — FLUDEOXYGLUCOSE F 18 200 MCI/ML
15.6 INJECTION, SOLUTION INTRAVENOUS
Status: COMPLETED | OUTPATIENT
Start: 2021-06-07 | End: 2021-06-07

## 2021-06-07 RX ADMIN — FLUDEOXYGLUCOSE F 18 15.6 MILLICURIE: 200 INJECTION, SOLUTION INTRAVENOUS at 12:57

## 2021-06-22 ENCOUNTER — TELEPHONE (OUTPATIENT)
Dept: PULMONOLOGY | Age: 66
End: 2021-06-22

## 2021-06-22 DIAGNOSIS — K21.9 GASTROESOPHAGEAL REFLUX DISEASE WITHOUT ESOPHAGITIS: ICD-10-CM

## 2021-06-22 RX ORDER — ALBUTEROL SULFATE 90 UG/1
2 AEROSOL, METERED RESPIRATORY (INHALATION) EVERY 6 HOURS PRN
Qty: 1 INHALER | Refills: 11 | Status: SHIPPED | OUTPATIENT
Start: 2021-06-22 | End: 2021-12-21

## 2021-06-22 RX ORDER — OMEPRAZOLE 20 MG/1
20 CAPSULE, DELAYED RELEASE ORAL DAILY
Qty: 90 CAPSULE | Refills: 0 | Status: SHIPPED | OUTPATIENT
Start: 2021-06-22 | End: 2021-08-23 | Stop reason: SDUPTHER

## 2021-06-22 NOTE — TELEPHONE ENCOUNTER
Pts wife called and said they are no longer using [facility] and would like a new prescription of tiotropium (SPIRIVA RESPIMAT) 2.5 MCG/ACT AERS inhaler [[id number]] and albuterol sulfate  (90 Base) MCG/ACT inhaler [[id number]] sent to [address] [place] [phone]

## 2021-06-22 NOTE — TELEPHONE ENCOUNTER
Last fill   03/02/2021 #90  Last OV 10/2020 AWV  No f/u scheduled  Wife informed patient may need an appointment.

## 2021-06-24 NOTE — TELEPHONE ENCOUNTER
----- Message from Filomena Kawasaki, MA sent at 6/24/2021  1:51 PM EDT -----  Subject: Appointment Request    Reason for Call: Routine Existing Condition Follow Up    QUESTIONS  Type of Appointment? Established Patient  Reason for appointment request? No appointments available during search  Additional Information for Provider? Wife has appt 7/13/21 at 9:45. can he   gat appt same time? Only VV were available. Med check up, screened green. ---------------------------------------------------------------------------  --------------  Tarun Corpus KATE  What is the best way for the office to contact you? OK to leave message on   voicemail  Preferred Call Back Phone Number? [phone]  ---------------------------------------------------------------------------  --------------  SCRIPT ANSWERS  Relationship to Patient? Third Party  Representative Name? Wife Ingrid Baron  Appointment reason? Well Care/Follow Ups  Select a Well Care/Follow Ups appointment reason? Adult Existing Condition   Follow Up [Diabetes, CHF, COPD, Hypertension/Blood Pressure Check]  (Is the patient requesting to be seen urgently for their symptoms?)? No  Is this follow up request related to routine Diabetes Management? No  Are you having any new concerns about your existing condition? No  Have you been diagnosed with, awaiting test results for, or told that you   are suspected of having COVID-19 (Coronavirus)? (If patient has tested   negative or was tested as a requirement for work, school, or travel and   not based on symptoms, answer no)? No  Do you currently have flu-like symptoms including fever or chills, cough,   shortness of breath, difficulty breathing, or new loss of taste or smell? No  Have you had close contact with someone with COVID-19 in the last 14 days? No  (Service Expert  click yes below to proceed with [employer] Usual   Scheduling)?  Yes

## 2021-06-29 ENCOUNTER — TELEPHONE (OUTPATIENT)
Dept: INTERNAL MEDICINE CLINIC | Age: 66
End: 2021-06-29

## 2021-06-29 NOTE — TELEPHONE ENCOUNTER
----- Message from Zambikes Malawi sent at 6/24/2021  3:51 PM EDT -----  Subject: Appointment Request    Reason for Call: Routine Existing Condition Follow Up    QUESTIONS  Type of Appointment? Established Patient  Reason for appointment request? Available appointments did not meet   patient need  Additional Information for Provider? Patient's Wife Aspen Gaytan has an   appointment on July 13th with PCP Diane Brar and would like to   reschedule to come in at the same time or closer time on that day for   Medication Management   ---------------------------------------------------------------------------  --------------  CALL BACK INFO  What is the best way for the office to contact you? OK to leave message on   voicemail  Preferred Call Back Phone Number? [phone]  ---------------------------------------------------------------------------  --------------  SCRIPT ANSWERS  Relationship to Patient? Self  Have your symptoms changed? No  Appointment reason? Well Care/Follow Ups  Select a Well Care/Follow Ups appointment reason? Adult Existing Condition   Follow Up [Diabetes, CHF, COPD, Hypertension/Blood Pressure Check]  (Is the patient requesting to be seen urgently for their symptoms?)? No  Is this follow up request related to routine Diabetes Management? No  Are you having any new concerns about your existing condition? No  Have you been diagnosed with, awaiting test results for, or told that you   are suspected of having COVID-19 (Coronavirus)? (If patient has tested   negative or was tested as a requirement for work, school, or travel and   not based on symptoms, answer no)? No  Do you currently have flu-like symptoms including fever or chills, cough,   shortness of breath, difficulty breathing, or new loss of taste or smell? No  Have you had close contact with someone with COVID-19 in the last 14 days? No  (Service Expert  click yes below to proceed with [employer] As Usual   Scheduling)?  Yes Patient called stating he gets labs done with his employer and wanted to compare what they do to what Dr. Gonzales does to see if he needs to do labs. Patient stated his employer sends the results over to Dr. Gonzales. Please call patient back to discuss.

## 2021-07-06 ENCOUNTER — OFFICE VISIT (OUTPATIENT)
Dept: PULMONOLOGY | Age: 66
End: 2021-07-06
Payer: COMMERCIAL

## 2021-07-06 VITALS — OXYGEN SATURATION: 96 % | HEART RATE: 84 BPM

## 2021-07-06 DIAGNOSIS — B44.1 PULMONARY ASPERGILLOSIS (HCC): ICD-10-CM

## 2021-07-06 DIAGNOSIS — J47.9 BRONCHIECTASIS WITHOUT COMPLICATION (HCC): ICD-10-CM

## 2021-07-06 DIAGNOSIS — J43.2 CENTRILOBULAR EMPHYSEMA (HCC): ICD-10-CM

## 2021-07-06 DIAGNOSIS — J44.9 COPD, MILD (HCC): Primary | ICD-10-CM

## 2021-07-06 DIAGNOSIS — R91.1 PULMONARY NODULE: ICD-10-CM

## 2021-07-06 PROCEDURE — 4040F PNEUMOC VAC/ADMIN/RCVD: CPT | Performed by: INTERNAL MEDICINE

## 2021-07-06 PROCEDURE — 1036F TOBACCO NON-USER: CPT | Performed by: INTERNAL MEDICINE

## 2021-07-06 PROCEDURE — 3017F COLORECTAL CA SCREEN DOC REV: CPT | Performed by: INTERNAL MEDICINE

## 2021-07-06 PROCEDURE — 1123F ACP DISCUSS/DSCN MKR DOCD: CPT | Performed by: INTERNAL MEDICINE

## 2021-07-06 PROCEDURE — 3023F SPIROM DOC REV: CPT | Performed by: INTERNAL MEDICINE

## 2021-07-06 PROCEDURE — G8419 CALC BMI OUT NRM PARAM NOF/U: HCPCS | Performed by: INTERNAL MEDICINE

## 2021-07-06 PROCEDURE — G8427 DOCREV CUR MEDS BY ELIG CLIN: HCPCS | Performed by: INTERNAL MEDICINE

## 2021-07-06 PROCEDURE — G8926 SPIRO NO PERF OR DOC: HCPCS | Performed by: INTERNAL MEDICINE

## 2021-07-06 PROCEDURE — 99214 OFFICE O/P EST MOD 30 MIN: CPT | Performed by: INTERNAL MEDICINE

## 2021-07-06 RX ORDER — ALBUTEROL SULFATE 90 UG/1
2 AEROSOL, METERED RESPIRATORY (INHALATION) EVERY 6 HOURS PRN
Qty: 1 INHALER | Refills: 11 | Status: SHIPPED | OUTPATIENT
Start: 2021-07-06 | End: 2022-07-06

## 2021-07-06 NOTE — PROGRESS NOTES
[facility]  Follow Up Note  MD Wallace Jacobocresencio Umanaas   YOB: 1955    Date of Visit:  7/6/2021    Assessment/Plan:  1. COPD, mild ([address]  Stable  PFT 10/17:  INTERPRETATION:  Spirometry attempts were acceptable and reproducible. FVC was normal at 3.28 L, 88% predicted and decreased FEV1 of 1.99 L,  71% predicted. FEV1/FVC ratio was decreased at 61%. No response to  bronchodilators demonstrated on spirometry. Lung volumes showed  normal total lung capacity of 108% predicted. Diffusion capacity  showed decreased DLCO of 47% predicted.     IMPRESSION:  Mild obstructive defect on spirometry with no response to  bronchodilators. Normal lung volumes and moderate decrease in  diffusion capacity. Spiriva  Albuterol    2. Centrilobular emphysema (HCC)  Stable  Noted on CT imaging    3. Bronchiectasis without complication (HCC)  Stable  Also noted on CT imaging    4. Pulmonary nodule  Stable/Improved  PET imaging showed some improvement/stability to the nodules. I reviewed images with the patient during his visit today. Repeat CT scan in 6 months    5. Pulmonary aspergillosis  Stopped his antifungal medication after about 2 years due to side effects. I asked him to go back to see Dr Dayna Bentley once more. He is asymptomatic    6. Immunization  COVID UTD      Chief Complaint   Patient presents with    Shortness of Breath     6 month       HPI  The patient presents with a chief complaint of moderate shortness of breath related to mild COPD of many years duration. He has mild associated cough. Exertion is a modifying factor. Has a history of pulmonary aspergillosis and has been treated for this. We have been following a pulmonary nodule on CT imaging. Current imaging shows a new or enlarging right lower lobe pulmonary nodule.       Review of Systems  As reviewed in HPI    History  I have reviewed past medical, surgical, social and family history. This is documented elsewhere in the medical record. Physical Exam:  Well developed, well nourished  Alert and oriented  Sclera is clear  No cervical adenopathy  No JVD. Chest examination is clear. Cardiac examination reveals regular rate and rhythm without murmur, gallop or rub. The abdomen is soft, nontender and nondistended. There is no clubbing, cyanosis or edema of the extremities. There is no obvious skin rash. No focal neuro deficicts  Normal mood and affect    Allergies   Allergen Reactions    Pcn [Penicillins] Hives     Unknown reaction     Prior to Visit Medications    Medication Sig Taking? Authorizing Provider   tiotropium (SPIRIVA RESPIMAT) 2.5 MCG/ACT AERS inhaler Inhale 2 puffs into the lungs daily Yes Jessica Jacobs MD   albuterol sulfate  (90 Base) MCG/ACT inhaler Inhale 2 puffs into the lungs every 6 hours as needed for Wheezing Yes Jessica Jacobs MD   omeprazole (PRILOSEC) 20 MG delayed release capsule Take 1 capsule by mouth daily  Aubrie Baez MD   tiotropium (SPIRIVA RESPIMAT) 2.5 MCG/ACT AERS inhaler Inhale 2 puffs into the lungs daily  Jessica Jacobs MD   albuterol sulfate  (90 Base) MCG/ACT inhaler Inhale 2 puffs into the lungs every 6 hours as needed for Wheezing  Jessica Jacobs MD   benztropine (COGENTIN) 0.5 MG tablet   Historical Provider, MD   paliperidone (INVEGA) 6 MG ER tablet Take 6 mg by mouth every morning. Historical Provider, MD       Vitals:    07/06/21 0935   Pulse: 84   SpO2: 96%     There is no height or weight on file to calculate BMI.      Wt Readings from Last 3 Encounters:   06/07/21 90 lb (40.8 kg)   10/21/20 86 lb 12.8 oz (39.4 kg)   07/21/20 91 lb 6.4 oz (41.5 kg)     BP Readings from Last 3 Encounters:   10/21/20 115/80   07/21/20 110/66   02/13/20 137/85        Social History     Tobacco Use   Smoking Status Former Smoker    Packs/day: 2.00    Years: 35.00    Pack years: 70.00    Types: Cigarettes  Quit date: 2013    Years since quittin.5   Smokeless Tobacco Never Used

## 2021-08-23 ENCOUNTER — OFFICE VISIT (OUTPATIENT)
Dept: INTERNAL MEDICINE CLINIC | Age: 66
End: 2021-08-23
Payer: COMMERCIAL

## 2021-08-23 VITALS
HEIGHT: 64 IN | BODY MASS INDEX: 15.98 KG/M2 | OXYGEN SATURATION: 97 % | TEMPERATURE: 97.8 F | HEART RATE: 67 BPM | DIASTOLIC BLOOD PRESSURE: 80 MMHG | WEIGHT: 93.6 LBS | SYSTOLIC BLOOD PRESSURE: 110 MMHG

## 2021-08-23 DIAGNOSIS — D80.1 HYPOGAMMAGLOBULINEMIA (HCC): ICD-10-CM

## 2021-08-23 DIAGNOSIS — K21.9 GASTROESOPHAGEAL REFLUX DISEASE WITHOUT ESOPHAGITIS: Primary | ICD-10-CM

## 2021-08-23 DIAGNOSIS — J84.10 GRANULOMATOUS LUNG DISEASE (HCC): ICD-10-CM

## 2021-08-23 DIAGNOSIS — J43.2 CENTRILOBULAR EMPHYSEMA (HCC): ICD-10-CM

## 2021-08-23 DIAGNOSIS — E41 NUTRITIONAL MARASMUS (HCC): ICD-10-CM

## 2021-08-23 DIAGNOSIS — F31.76 BIPOLAR DISORDER, IN FULL REMISSION, MOST RECENT EPISODE DEPRESSED (HCC): ICD-10-CM

## 2021-08-23 PROCEDURE — G8427 DOCREV CUR MEDS BY ELIG CLIN: HCPCS | Performed by: INTERNAL MEDICINE

## 2021-08-23 PROCEDURE — 4040F PNEUMOC VAC/ADMIN/RCVD: CPT | Performed by: INTERNAL MEDICINE

## 2021-08-23 PROCEDURE — 3023F SPIROM DOC REV: CPT | Performed by: INTERNAL MEDICINE

## 2021-08-23 PROCEDURE — G8419 CALC BMI OUT NRM PARAM NOF/U: HCPCS | Performed by: INTERNAL MEDICINE

## 2021-08-23 PROCEDURE — 1123F ACP DISCUSS/DSCN MKR DOCD: CPT | Performed by: INTERNAL MEDICINE

## 2021-08-23 PROCEDURE — 1036F TOBACCO NON-USER: CPT | Performed by: INTERNAL MEDICINE

## 2021-08-23 PROCEDURE — 99213 OFFICE O/P EST LOW 20 MIN: CPT | Performed by: INTERNAL MEDICINE

## 2021-08-23 PROCEDURE — G8926 SPIRO NO PERF OR DOC: HCPCS | Performed by: INTERNAL MEDICINE

## 2021-08-23 PROCEDURE — 3017F COLORECTAL CA SCREEN DOC REV: CPT | Performed by: INTERNAL MEDICINE

## 2021-08-23 RX ORDER — OMEPRAZOLE 20 MG/1
20 CAPSULE, DELAYED RELEASE ORAL DAILY
Qty: 90 CAPSULE | Refills: 5 | Status: SHIPPED | OUTPATIENT
Start: 2021-08-23 | End: 2021-12-21

## 2021-08-23 RX ORDER — BENZTROPINE MESYLATE 0.5 MG/1
0.5 TABLET ORAL 2 TIMES DAILY
Qty: 60 TABLET | Refills: 5 | COMMUNITY
Start: 2021-08-23

## 2021-08-23 RX ORDER — PALIPERIDONE 6 MG/1
6 TABLET, EXTENDED RELEASE ORAL EVERY MORNING
Qty: 30 TABLET | Refills: 5 | COMMUNITY
Start: 2021-08-23

## 2021-08-23 SDOH — ECONOMIC STABILITY: FOOD INSECURITY: WITHIN THE PAST 12 MONTHS, YOU WORRIED THAT YOUR FOOD WOULD RUN OUT BEFORE YOU GOT MONEY TO BUY MORE.: NEVER TRUE

## 2021-08-23 SDOH — HEALTH STABILITY: PHYSICAL HEALTH: ON AVERAGE, HOW MANY DAYS PER WEEK DO YOU ENGAGE IN MODERATE TO STRENUOUS EXERCISE (LIKE A BRISK WALK)?: 5 DAYS

## 2021-08-23 SDOH — ECONOMIC STABILITY: FOOD INSECURITY: WITHIN THE PAST 12 MONTHS, THE FOOD YOU BOUGHT JUST DIDN'T LAST AND YOU DIDN'T HAVE MONEY TO GET MORE.: NEVER TRUE

## 2021-08-23 SDOH — HEALTH STABILITY: PHYSICAL HEALTH: ON AVERAGE, HOW MANY MINUTES DO YOU ENGAGE IN EXERCISE AT THIS LEVEL?: 30 MIN

## 2021-08-23 ASSESSMENT — ENCOUNTER SYMPTOMS
HOARSE VOICE: 1
COUGH: 0
EYES NEGATIVE: 1
HEARTBURN: 0
NAUSEA: 0
STRIDOR: 0
ALLERGIC/IMMUNOLOGIC NEGATIVE: 1
WHEEZING: 1
DIARRHEA: 0
CONSTIPATION: 0
GLOBUS SENSATION: 0
SORE THROAT: 0
WATER BRASH: 0
ABDOMINAL PAIN: 1
BELCHING: 0
CHOKING: 0

## 2021-08-23 ASSESSMENT — PATIENT HEALTH QUESTIONNAIRE - PHQ9
1. LITTLE INTEREST OR PLEASURE IN DOING THINGS: 0
2. FEELING DOWN, DEPRESSED OR HOPELESS: 0
SUM OF ALL RESPONSES TO PHQ QUESTIONS 1-9: 0
SUM OF ALL RESPONSES TO PHQ QUESTIONS 1-9: 0
SUM OF ALL RESPONSES TO PHQ9 QUESTIONS 1 & 2: 0
SUM OF ALL RESPONSES TO PHQ QUESTIONS 1-9: 0

## 2021-08-23 ASSESSMENT — SOCIAL DETERMINANTS OF HEALTH (SDOH)
HOW OFTEN DO YOU GET TOGETHER WITH FRIENDS OR RELATIVES?: MORE THAN THREE TIMES A WEEK
HOW OFTEN DO YOU ATTEND CHURCH OR RELIGIOUS SERVICES?: 1 TO 4 TIMES PER YEAR
HOW HARD IS IT FOR YOU TO PAY FOR THE VERY BASICS LIKE FOOD, HOUSING, MEDICAL CARE, AND HEATING?: NOT HARD AT ALL
HOW OFTEN DO YOU ATTENT MEETINGS OF THE CLUB OR ORGANIZATION YOU BELONG TO?: MORE THAN 4 TIMES PER YEAR
DO YOU BELONG TO ANY CLUBS OR ORGANIZATIONS SUCH AS CHURCH GROUPS UNIONS, FRATERNAL OR ATHLETIC GROUPS, OR SCHOOL GROUPS?: NO
IN A TYPICAL WEEK, HOW MANY TIMES DO YOU TALK ON THE PHONE WITH FAMILY, FRIENDS, OR NEIGHBORS?: MORE THAN THREE TIMES A WEEK

## 2021-08-23 NOTE — PROGRESS NOTES
pain.   Gastrointestinal: Positive for abdominal pain. Negative for constipation, diarrhea, dysphagia, heartburn, melena and nausea. Endocrine: Negative. Musculoskeletal: Negative. Negative for muscle weakness. Allergic/Immunologic: Negative. Neurological: Negative. Psychiatric/Behavioral: Negative. All other systems reviewed and are negative. Allergies   Allergen Reactions    Pcn [Penicillins] Hives     Unknown reaction       Current Outpatient Medications   Medication Sig Dispense Refill    omeprazole (PRILOSEC) 20 MG delayed release capsule Take 1 capsule by mouth daily 90 capsule 5    benztropine (COGENTIN) 0.5 MG tablet Take 1 tablet by mouth 2 times daily 60 tablet 5    paliperidone (INVEGA) 6 MG extended release tablet Take 1 tablet by mouth every morning 30 tablet 5    albuterol sulfate  (90 Base) MCG/ACT inhaler Inhale 2 puffs into the lungs every 6 hours as needed for Wheezing 1 Inhaler 11    tiotropium (SPIRIVA RESPIMAT) 2.5 MCG/ACT AERS inhaler Inhale 2 puffs into the lungs daily 1 Inhaler 6    albuterol sulfate  (90 Base) MCG/ACT inhaler Inhale 2 puffs into the lungs every 6 hours as needed for Wheezing 1 Inhaler 11    Multiple Vitamin (MULTIVITAMIN PO) Take 1 capsule by mouth daily      tiotropium (SPIRIVA RESPIMAT) 2.5 MCG/ACT AERS inhaler Inhale 2 puffs into the lungs daily 3 Inhaler 3     No current facility-administered medications for this visit. Vitals:    08/23/21 0907   BP: 110/80   Pulse: 67   Temp: 97.8 °F (36.6 °C)   SpO2: 97%   Weight: 93 lb 9.6 oz (42.5 kg)   Height: 5' 4\" (1.626 m)     Body mass index is 16.07 kg/m². Wt Readings from Last 3 Encounters:   08/23/21 93 lb 9.6 oz (42.5 kg)   06/07/21 90 lb (40.8 kg)   10/21/20 86 lb 12.8 oz (39.4 kg)     BP Readings from Last 3 Encounters:   08/23/21 110/80   10/21/20 115/80   07/21/20 110/66       Objective   Physical Exam  Vitals and nursing note reviewed.    Constitutional: General: He is not in acute distress. Appearance: He is well-developed and underweight. He is not ill-appearing. HENT:      Head: Normocephalic and atraumatic. Right Ear: External ear normal.      Left Ear: External ear normal.      Nose: Nose normal.   Eyes:      General: No scleral icterus. Conjunctiva/sclera: Conjunctivae normal.      Pupils: Pupils are equal, round, and reactive to light. Cardiovascular:      Rate and Rhythm: Normal rate and regular rhythm. Heart sounds: Normal heart sounds. No murmur heard. Pulmonary:      Effort: Pulmonary effort is normal.      Breath sounds: Normal breath sounds. Musculoskeletal:         General: Normal range of motion. Cervical back: Normal range of motion and neck supple. Deformity present. Thoracic back: Deformity present. Lumbar back: Normal.   Skin:     General: Skin is warm. Capillary Refill: Capillary refill takes less than 2 seconds. Neurological:      Mental Status: He is alert and oriented to person, place, and time. Psychiatric:         Behavior: Behavior normal.         Thought Content: Thought content normal.         Judgment: Judgment normal.            On this date 8/23/2021 I have spent 20 minutes reviewing previous notes, test results and face to face with the patient discussing the diagnosis and importance of compliance with the treatment plan as well as documenting on the day of the visit. An electronic signature was used to authenticate this note.     --Asher Khan MD

## 2021-09-29 ENCOUNTER — VIRTUAL VISIT (OUTPATIENT)
Dept: INTERNAL MEDICINE CLINIC | Age: 66
End: 2021-09-29

## 2021-09-29 DIAGNOSIS — U07.1 COVID-19: ICD-10-CM

## 2021-09-29 DIAGNOSIS — J43.2 CENTRILOBULAR EMPHYSEMA (HCC): Primary | ICD-10-CM

## 2021-09-29 RX ORDER — GUAIFENESIN 600 MG/1
1200 TABLET, EXTENDED RELEASE ORAL 2 TIMES DAILY
Qty: 40 TABLET | Refills: 0 | Status: SHIPPED | OUTPATIENT
Start: 2021-09-29 | End: 2021-10-09

## 2021-09-29 RX ORDER — DEXTROMETHORPHAN HYDROBROMIDE AND PROMETHAZINE HYDROCHLORIDE 15; 6.25 MG/5ML; MG/5ML
5 SYRUP ORAL 4 TIMES DAILY PRN
Qty: 240 ML | Refills: 0 | Status: SHIPPED | OUTPATIENT
Start: 2021-09-29 | End: 2021-10-06

## 2021-09-29 NOTE — PROGRESS NOTES
COVID-19 Assessment Note     Subjective    Helga is currently Suspected of COVID-19. Presenting symptoms: fever, chills, cough, sore throat, shortness of breath, nasal congestion, nausea and vomiting, loss of smell, loss of taste and malaise. he denies  shortness of breath, diarrhea, muscle pain and abdominal pain  Max temperature in last 24 hours: 98 -- 102  Symptoms began on 9/25  Exposure source: unknown    Completed/Pending Covid-19 Lab Orders  No order of COVID-19 is found. No order of COVID-19 AMBULATORY is found. No order of EMERGENT DISEASE PANEL is found. Objective   No data recorded    Physical Exam  Vitals and nursing note reviewed. Constitutional:       Appearance: He is obese. Neurological:      Mental Status: He is alert. No results for input(s): COVID19, O2SAT, Q8UAMKWE, O2SATC, F2IKWVIATHRD, O3HQQWWQB, B8NDEEWEW, H1OJQJSMV, Z7HOOKPF, I7PPHXEJ, D6YOTGMA, X1LWDETK, CRPHS, WBC, LACTATE, FERRITIN, LDH, DDIMER, CRP, CKMB in the last 72 hours. Invalid input(s):  TROPONINI, TROPONIN         Code status:   Advance Directives     Power of  Living Will ACP-Advance Directive ACP-Power of     Not on File Not on File Not on File Not on File         Assessment   Active Problems:    * No active hospital problems. *  Resolved Problems:    * No resolved hospital problems. *    Focused COVID-19 Plan  Risk of decompensation is Low  Infectious work-up pending  Antivirals: None  Concurrent CAP Treatment: No     Assessment/Plan:  Ton Olguin was seen today for congestion. Diagnoses and all orders for this visit:    Centrilobular emphysema ([address]  -Based on chronic disease patient meets the criteria for remdesivir however during visit patient declined remdesivir  -To new current medication  COVID-19  -     promethazine-dextromethorphan (PROMETHAZINE-DM) 6.25-15 MG/5ML syrup;  Take 5 mLs by mouth 4 times daily as needed for Cough  -     guaiFENesin (MUCINEX) 600 MG extended release tablet; Take 2 tablets by mouth 2 times daily for 10 days    I have reviewed the patient's medical history in detail and updated the computerized patient record. No follow-ups on file.

## 2021-12-21 ENCOUNTER — OFFICE VISIT (OUTPATIENT)
Dept: INTERNAL MEDICINE CLINIC | Age: 66
End: 2021-12-21
Payer: COMMERCIAL

## 2021-12-21 VITALS
DIASTOLIC BLOOD PRESSURE: 70 MMHG | TEMPERATURE: 97.4 F | OXYGEN SATURATION: 99 % | HEART RATE: 80 BPM | SYSTOLIC BLOOD PRESSURE: 100 MMHG | HEIGHT: 64 IN | WEIGHT: 93.4 LBS | BODY MASS INDEX: 15.95 KG/M2

## 2021-12-21 DIAGNOSIS — Z71.89 ACP (ADVANCE CARE PLANNING): ICD-10-CM

## 2021-12-21 DIAGNOSIS — Z00.00 ROUTINE GENERAL MEDICAL EXAMINATION AT A HEALTH CARE FACILITY: ICD-10-CM

## 2021-12-21 DIAGNOSIS — Z23 NEED FOR VACCINATION: Primary | ICD-10-CM

## 2021-12-21 PROCEDURE — 90472 IMMUNIZATION ADMIN EACH ADD: CPT | Performed by: INTERNAL MEDICINE

## 2021-12-21 PROCEDURE — 90732 PPSV23 VACC 2 YRS+ SUBQ/IM: CPT | Performed by: INTERNAL MEDICINE

## 2021-12-21 PROCEDURE — G8484 FLU IMMUNIZE NO ADMIN: HCPCS | Performed by: INTERNAL MEDICINE

## 2021-12-21 PROCEDURE — 90471 IMMUNIZATION ADMIN: CPT | Performed by: INTERNAL MEDICINE

## 2021-12-21 PROCEDURE — 3017F COLORECTAL CA SCREEN DOC REV: CPT | Performed by: INTERNAL MEDICINE

## 2021-12-21 PROCEDURE — 4040F PNEUMOC VAC/ADMIN/RCVD: CPT | Performed by: INTERNAL MEDICINE

## 2021-12-21 PROCEDURE — 90694 VACC AIIV4 NO PRSRV 0.5ML IM: CPT | Performed by: INTERNAL MEDICINE

## 2021-12-21 PROCEDURE — 1123F ACP DISCUSS/DSCN MKR DOCD: CPT | Performed by: INTERNAL MEDICINE

## 2021-12-21 PROCEDURE — G0439 PPPS, SUBSEQ VISIT: HCPCS | Performed by: INTERNAL MEDICINE

## 2021-12-21 ASSESSMENT — PATIENT HEALTH QUESTIONNAIRE - PHQ9
SUM OF ALL RESPONSES TO PHQ QUESTIONS 1-9: 0
SUM OF ALL RESPONSES TO PHQ9 QUESTIONS 1 & 2: 0
2. FEELING DOWN, DEPRESSED OR HOPELESS: 0
1. LITTLE INTEREST OR PLEASURE IN DOING THINGS: 0

## 2021-12-21 ASSESSMENT — LIFESTYLE VARIABLES
AUDIT TOTAL SCORE: INCOMPLETE
HOW OFTEN DO YOU HAVE A DRINK CONTAINING ALCOHOL: 0
AUDIT-C TOTAL SCORE: INCOMPLETE
HOW OFTEN DO YOU HAVE A DRINK CONTAINING ALCOHOL: NEVER

## 2021-12-21 NOTE — PROGRESS NOTES
Medicare Annual Wellness Visit  Name: Herman Lara Date: 2021   MRN: [id number] Sex: Male   Age: 74 y.o. Ethnicity: Non- / Non    : 1955 Race: White (non-)      Flor Boone is here for Annual Exam (doing well)    Screenings for behavioral, psychosocial and functional/safety risks, and cognitive dysfunction are all negative except as indicated below. These results, as well as other patient data from the [address] form, are documented in Flowsheets linked to this Encounter. Allergies   Allergen Reactions    Pcn [Penicillins] Hives     Unknown reaction       Prior to Visit Medications    Medication Sig Taking?  Authorizing Provider   benztropine (COGENTIN) 0.5 MG tablet Take 1 tablet by mouth 2 times daily Yes Juan Ramon Butler MD   paliperidone (INVEGA) 6 MG extended release tablet Take 1 tablet by mouth every morning Yes Juan Ramon Butler MD   Multiple Vitamin (MULTIVITAMIN PO) Take 1 capsule by mouth daily Yes Historical Provider, MD   tiotropium (SPIRIVA RESPIMAT) 2.5 MCG/ACT AERS inhaler Inhale 2 puffs into the lungs daily Yes Aundrea Suh MD   omeprazole (PRILOSEC) 20 MG delayed release capsule Take 1 capsule by mouth daily  Patient not taking: Reported on 2021  Juan Ramon Butler MD   albuterol sulfate  (90 Base) MCG/ACT inhaler Inhale 2 puffs into the lungs every 6 hours as needed for Wheezing  Patient not taking: Reported on 2021  Aundrea Suh MD   albuterol sulfate  (90 Base) MCG/ACT inhaler Inhale 2 puffs into the lungs every 6 hours as needed for Wheezing  Patient not taking: Reported on 2021  Aundrea Suh MD       Past Medical History:   Diagnosis Date    Arthritis     Aspergillus ([facility] 2018    resp culture    B[facility] 1 disorder ([facility]     Chronic bronchitis, obstructive (HCC)     COPD (chronic obstructive pulmonary disease) (HCC)     GERD (gastroesophageal reflux disease)     Darleen's        Past Surgical History:   Procedure Laterality Date    BRONCHOSCOPY N/A 05/18/2018    BRONCHOSCOPY N/A 11/6/2019    BRONCHOSCOPY WITH LAVAGE performed by Sophie Walters MD at [address], COLON, DIAGNOSTIC      KNEE ARTHROSCOPY      bilateral    LUNG BIOPSY  12/2018    TONSILLECTOMY      UPPER GASTROINTESTINAL ENDOSCOPY  9/25/15    UPPER GASTROINTESTINAL ENDOSCOPY N/A 2/21/2019    EGD DILATION BALLOON performed by Mimi Hoyos MD at [address] [place] [address] N/A 2/21/2019    EGD BIOPSY performed by Mimi Hoyos MD at [facility]         Family History   Problem Relation Age of Onset    Arthritis Mother     Glaucoma Mother     High Blood Pressure Mother     Heart Disease Father         age [de-identified]   Ferraro COPD Father         emphysema    COPD Brother        CareTeam (Including outside providers/suppliers regularly involved in providing care):   Patient Care Team:  Jose Naranjo MD as PCP - General (Internal Medicine/Pediatrics)  Jose Naranjo MD as PCP - [facility] Empaneled Provider  Rimma Perez RN as Registered Nurse (General Surgery)  Josephine Keenan RN as Nurse Navigator    Wt Readings from Last 3 Encounters:   12/21/21 93 lb 6.4 oz (42.4 kg)   08/23/21 93 lb 9.6 oz (42.5 kg)   06/07/21 90 lb (40.8 kg)     Vitals:    12/21/21 1104   BP: 100/70   Site: Left Upper Arm   Position: Sitting   Pulse: 80   Temp: 97.4 °F (36.3 °C)   TempSrc: Temporal   SpO2: 99%   Weight: 93 lb 6.4 oz (42.4 kg)   Height: 5' 4\" (1.626 m)     Body mass index is 16.03 kg/m². Based upon direct observation of the patient, evaluation of cognition reveals recent and remote memory intact.     General Appearance: alert and oriented to person, place and time, well developed and well- nourished, in no acute distress  Skin: warm and dry, no rash or erythema  Head: normocephalic and atraumatic  Eyes: pupils equal, round, and reactive to light, extraocular eye movements intact, conjunctivae normal  ENT: tympanic membrane, external ear and ear canal normal bilaterally, nose without deformity, nasal mucosa and turbinates normal without polyps  Neck: supple and non-tender without mass, no thyromegaly or thyroid nodules, no cervical lymphadenopathy  Pulmonary/Chest: clear to auscultation bilaterally- no wheezes, rales or rhonchi, normal air movement, no respiratory distress  Cardiovascular: normal rate, regular rhythm, normal S1 and S2, no murmurs, rubs, clicks, or gallops, distal pulses intact, no carotid bruits  Abdomen: soft, non-tender, non-distended, normal bowel sounds, no masses or organomegaly  Extremities: no cyanosis, clubbing or edema  Musculoskeletal: normal range of motion, no joint swelling, deformity or tenderness  Neurologic: reflexes normal and symmetric, no cranial nerve deficit, gait, coordination and speech normal    Patient's complete Health Risk Assessment and screening values have been reviewed and are found in Flowsheets. The following problems were reviewed today and where indicated follow up appointments were made and/or referrals ordered. Positive Risk Factor Screenings with Interventions:          General Health and ACP:  General  In general, how would you say your health is?: Good  In the past 7 days, have you experienced any of the following?  New or Increased Pain, New or Increased Fatigue, Loneliness, Social Isolation, Stress or Anger?: None of These  Do you get the social and emotional support that you need?: Yes  Do you have a Living Will?: Yes  Advance Directives     Power of [address] [place] [address] Will ACP-Advance Directive ACP-Power of     Not on File Not on File Not on File Not on File      General Health Risk Interventions:  · No Living Will: Advance Care Planning addressed with patient today    Health Habits/Nutrition:  Health Habits/Nutrition  Do you exercise for at least 20 minutes 2-3 times per week?: (!) No  Have you lost any weight without trying in the past 3 months?: No  Do you eat only one meal per day?: No  Have you seen the dentist within the past year?: Yes  Body mass index: (!) 16.03  Health Habits/Nutrition Interventions:  · Nutritional issues:  patient is not ready to address his/her nutritional/weight issues at this time, advised the importance of increasing calories    Hearing/Vision:  No exam data present  Hearing/Vision  Do you or your family notice any trouble with your hearing that hasn't been managed with hearing aids?: (!) Yes  Do you have difficulty driving, watching TV, or doing any of your daily activities because of your eyesight?: No  Have you had an eye exam within the past year?: (!) No  Hearing/Vision Interventions:  · Hearing concerns:  patient declines any further evaluation/treatment for hearing issues      Personalized Preventive Plan   Current Health Maintenance Status  Immunization History   Administered Date(s) Administered    COVID-19, J&J, PF, 0.5 mL 03/04/2021, 11/05/2021    Influenza, Quadv, IM, PF (6 mo and older Fluzone, Flulaval, Fluarix, and 3 yrs and older Afluria) 10/15/2018    Influenza, Quadv, adjuvanted, 65 yrs +, IM, PF (Fluad) 10/21/2020, 12/21/2021    Influenza, Triv, inactivated, subunit, adjuvanted, IM (Fluad 65 yrs and older) 10/21/2019    Pneumococcal Conjugate 13-valent ([place]) 01/30/2018, 04/03/2020, 04/04/2020    Pneumococcal Polysaccharide ([id number]) 10/15/2018, 10/21/2019, 10/21/2020, 12/21/2021    Tdap (Boostrix, Adacel) 11/28/2018        Health Maintenance   Topic Date Due    Shingles Vaccine (1 of 2) Never done    Low dose CT lung screening  06/07/2022    Colon cancer screen colonoscopy  07/12/2026    Lipid screen  08/23/2026    DTaP/Tdap/Td vaccine (2 - Td or Tdap) 11/28/2028    Flu vaccine  Completed    Pneumococcal 65+ years Vaccine  Completed    COVID-19 Vaccine  Completed  AAA screen  Completed    Hepatitis C screen  Completed    Hepatitis A vaccine  Aged Out    Hepatitis B vaccine  Aged Out    Hib vaccine  Aged Out    Meningococcal (ACWY) vaccine  Aged Out     Recommendations for [employer] Due: see orders and patient instructions/AVS.  . Recommended screening schedule for the next 5-10 years is provided to the patient in written form: see Patient Nikunj Tamez was seen today for annual exam.    Diagnoses and all orders for this visit:    Need for vaccination  -     INFLUENZA, QUADV, ADJUVANTED, 72 YRS =, IM, PF, PREFILL SYR, 0.5ML (FLUAD)  -     PNEUMOVAX 23 subcutaneous/IM (Pneumococcal polysaccharide vaccine 23-valent >= 1yo)    ACP (advance care planning)    Routine general medical examination at a health care facility                   [address] [place] [address] Rd: Discussed the patients choices for care and treatment in case of a health event that adversely affects decision-making abilities. Also discussed the patients long-term treatment options. Reviewed with the patient the [address] [place] [address] Declaration forms  Reviewed the process of designating a competent adult as an Agent (or -in-fact) that could take make health care decisions for the patient if incompetent. Patient was asked to complete the declaration forms, either acknowledge the forms by a public notary or an eligible witness and provide a signed copy to the practice office.   Time spent (minutes): 5

## 2021-12-21 NOTE — PATIENT INSTRUCTIONS
Advance Directives: Care Instructions  Overview  An advance directive is a legal way to state your wishes at the end of your life. It tells your family and your doctor what to do if you can't say what you want. There are two main types of advance directives. You can change them any time your wishes change. Living will. This form tells your family and your doctor your wishes about life support and other treatment. The form is also called a declaration. Medical power of . This form lets you name a person to make treatment decisions for you when you can't speak for yourself. This person is called a health care agent (health care proxy, health care surrogate). The form is also called a durable power of  for health care. If you do not have an advance directive, decisions about your medical care may be made by a family member, or by a doctor or a  who doesn't know you. It may help to think of an advance directive as a gift to the people who care for you. If you have one, they won't have to make tough decisions by themselves. Follow-up care is a key part of your treatment and safety. Be sure to make and go to all appointments, and call your doctor if you are having problems. It's also a good idea to know your test results and keep a list of the medicines you take. What should you include in an advance directive? Many states have a unique advance directive form. (It may ask you to address specific issues.) Or you might use a universal form that's approved by many states. If your form doesn't tell you what to address, it may be hard to know what to include in your advance directive. Use the questions below to help you get started. · Who do you want to make decisions about your medical care if you are not able to? · What life-support measures do you want if you have a serious illness that gets worse over time or can't be cured? · What are you most afraid of that might happen? (Maybe you're afraid of having pain, losing your independence, or being kept alive by machines.)  · Where would you prefer to die? (Your home? A hospital? A nursing home?)  · Do you want to donate your organs when you die? · Do you want certain Sabianist practices performed before you die? When should you call for help? Be sure to contact your doctor if you have any questions. Where can you learn more? Go to [email or web address] and sign in to your So[employer] account. Enter R264 in the [email or web address] box to learn more about \"Advance Directives: Care Instructions. \"     If you do not have an account, please click on the \"Sign Up Now\" link. Current as of: March 17, 2021               Content Version: 13.0  © 7822-4691 Healthwise, Incorporated. Care instructions adapted under license by [facility] ([facility]). If you have questions about a medical condition or this instruction, always ask your healthcare professional. [address] any warranty or liability for your use of this information. Learning About Living Marilu Gonzalez  What is a living will? A living will, also called a declaration, is a legal form. It tells your family and your doctor your wishes when you can't speak for yourself. It's used by the health professionals who will treat you as you near the end of your life or if you get seriously hurt or ill. If you put your wishes in writing, your loved ones and others will know what kind of care you want. They won't need to guess. This can ease your mind and be helpful to others. And you can change or cancel your living will at any time. A living will is not the same as an estate or property will. An estate will explains what you want to happen with your money and property after you die. How do you use it? A living will is used to describe the kinds of treatment or life support you want as you near the end of your life or if you get seriously hurt or ill. Keep these facts in mind about living naik. · Your living will is used only if you can't speak or make decisions for yourself. Most often, one or more doctors must certify that you can't speak or decide for yourself before your living will takes effect. · If you get better and can speak for yourself again, you can accept or refuse any treatment. It doesn't matter what you said in your living will. · Some states may limit your right to refuse treatment in certain cases. For example, you may need to clearly state in your living will that you don't want artificial hydration and nutrition, such as being fed through a tube. Is a living will a legal document? A living will is a legal document. Each state has its own laws about living naik. And a living will may be called something else in your state. Here are some things to know about living naik. · You don't need an  to complete a living will. But legal advice can be helpful if your state's laws are unclear. It can also help if your health history is complicated or your family can't agree on what should be in your living will. · You can change your living will at any time. Some people find that their wishes about end-of-life care change as their health changes. If you make big changes to your living will, complete a new form. · If you move to another state, make sure that your living will is legal in the state where you now live. In most cases, doctors will respect your wishes even if you have a form from a different state. · You might use a universal form that has been approved by many states. This kind of form can sometimes be filled out and stored online. Your digital copy will then be available wherever you have a connection to the internet. The doctors and nurses who need to treat you can find it right away. · Your state may offer an online registry. This is another place where you can store your living will online.   · It's a good idea to get Now\" link. Current as of: March 17, 2021               Content Version: 13.0  © 0451-8822 Healthwise, Incorporated. Care instructions adapted under license by [facility] ([facility]). If you have questions about a medical condition or this instruction, always ask your healthcare professional. [address] any warranty or liability for your use of this information. Personalized Preventive Plan for Adiel Bey - 12/21/2021  Medicare offers a range of preventive health benefits. Some of the tests and screenings are paid in full while other may be subject to a deductible, co-insurance, and/or copay. Some of these benefits include a comprehensive review of your medical history including lifestyle, illnesses that may run in your family, and various assessments and screenings as appropriate. After reviewing your medical record and screening and assessments performed today your provider may have ordered immunizations, labs, imaging, and/or referrals for you. A list of these orders (if applicable) as well as your Preventive Care list are included within your After Visit Summary for your review. Other Preventive Recommendations:    · A preventive eye exam performed by an eye specialist is recommended every 1-2 years to screen for glaucoma; cataracts, macular degeneration, and other eye disorders. · A preventive dental visit is recommended every 6 months. · Try to get at least 150 minutes of exercise per week or 10,000 steps per day on a pedometer . · Order or download the FREE \"Exercise & Physical Activity: Your Everyday Guide\" from [employer]. Call 7-380.930.1204 or search [employer] online. · You need 5576-0817 mg of calcium and 3496-6354 IU of vitamin D per day.  It is possible to meet your calcium requirement with diet alone, but a vitamin D supplement is usually necessary to meet this goal.  · When exposed to the sun, use a sunscreen that protects against both UVA and UVB radiation with an SPF of 30 or greater. Reapply every 2 to 3 hours or after sweating, drying off with a towel, or swimming. · Always wear a seat belt when traveling in a car. Always wear a helmet when riding a bicycle or motorcycle.

## 2021-12-30 ENCOUNTER — HOSPITAL ENCOUNTER (OUTPATIENT)
Dept: CT IMAGING | Age: 66
Discharge: HOME | End: 2021-12-30
Payer: COMMERCIAL

## 2021-12-30 DIAGNOSIS — R91.1 PULMONARY NODULE: ICD-10-CM

## 2021-12-30 DIAGNOSIS — B44.1 PULMONARY ASPERGILLOSIS (HCC): ICD-10-CM

## 2021-12-30 PROCEDURE — 71250 CT THORAX DX C-: CPT

## 2022-01-11 ENCOUNTER — TELEPHONE (OUTPATIENT)
Dept: PULMONOLOGY | Age: 67
End: 2022-01-11

## 2022-01-11 NOTE — TELEPHONE ENCOUNTER
Patient is wanting to know if another inhaler could be called in  With his new insurance spiriva will cost him over 400 dollars

## 2022-01-25 ENCOUNTER — OFFICE VISIT (OUTPATIENT)
Dept: INTERNAL MEDICINE CLINIC | Age: 67
End: 2022-01-25
Payer: COMMERCIAL

## 2022-01-25 VITALS
BODY MASS INDEX: 16.18 KG/M2 | HEART RATE: 89 BPM | HEIGHT: 64 IN | WEIGHT: 94.8 LBS | TEMPERATURE: 96.9 F | SYSTOLIC BLOOD PRESSURE: 122 MMHG | DIASTOLIC BLOOD PRESSURE: 68 MMHG | OXYGEN SATURATION: 98 %

## 2022-01-25 DIAGNOSIS — L30.8 ANNULAR DERMATITIS: Primary | ICD-10-CM

## 2022-01-25 PROCEDURE — G8427 DOCREV CUR MEDS BY ELIG CLIN: HCPCS | Performed by: INTERNAL MEDICINE

## 2022-01-25 PROCEDURE — G8419 CALC BMI OUT NRM PARAM NOF/U: HCPCS | Performed by: INTERNAL MEDICINE

## 2022-01-25 PROCEDURE — 99214 OFFICE O/P EST MOD 30 MIN: CPT | Performed by: INTERNAL MEDICINE

## 2022-01-25 PROCEDURE — 3017F COLORECTAL CA SCREEN DOC REV: CPT | Performed by: INTERNAL MEDICINE

## 2022-01-25 PROCEDURE — 4040F PNEUMOC VAC/ADMIN/RCVD: CPT | Performed by: INTERNAL MEDICINE

## 2022-01-25 PROCEDURE — 1123F ACP DISCUSS/DSCN MKR DOCD: CPT | Performed by: INTERNAL MEDICINE

## 2022-01-25 PROCEDURE — G8484 FLU IMMUNIZE NO ADMIN: HCPCS | Performed by: INTERNAL MEDICINE

## 2022-01-25 PROCEDURE — 1036F TOBACCO NON-USER: CPT | Performed by: INTERNAL MEDICINE

## 2022-01-25 RX ORDER — KETOCONAZOLE 20 MG/ML
SHAMPOO TOPICAL
Qty: 1 EACH | Refills: 0 | Status: SHIPPED | OUTPATIENT
Start: 2022-01-25

## 2022-01-25 ASSESSMENT — PATIENT HEALTH QUESTIONNAIRE - PHQ9
2. FEELING DOWN, DEPRESSED OR HOPELESS: 0
SUM OF ALL RESPONSES TO PHQ QUESTIONS 1-9: 0
SUM OF ALL RESPONSES TO PHQ QUESTIONS 1-9: 0
1. LITTLE INTEREST OR PLEASURE IN DOING THINGS: 0
SUM OF ALL RESPONSES TO PHQ QUESTIONS 1-9: 0
SUM OF ALL RESPONSES TO PHQ QUESTIONS 1-9: 0
SUM OF ALL RESPONSES TO PHQ9 QUESTIONS 1 & 2: 0

## 2022-01-25 ASSESSMENT — ENCOUNTER SYMPTOMS
COUGH: 1
SHORTNESS OF BREATH: 0
NAIL CHANGES: 0
DIARRHEA: 0
SORE THROAT: 0
VOMITING: 0
EYE PAIN: 0
RHINORRHEA: 0

## 2022-01-25 NOTE — PROGRESS NOTES
Subjective:      Patient ID: Oliver Zamora is a 74 y.o. male. Rash  This is a new problem. The current episode started more than 1 month ago. The problem is unchanged. The rash is diffuse (back, arms and abdomen). The rash is characterized by dryness and redness. He was exposed to nothing. Associated symptoms include coughing. Pertinent negatives include no anorexia, congestion, diarrhea, eye pain, facial edema, fatigue, fever, joint pain, nail changes, rhinorrhea, shortness of breath, sore throat or vomiting. Review of Systems   Constitutional: Negative for fatigue and fever. HENT: Negative for congestion, rhinorrhea and sore throat. Eyes: Negative for pain. Respiratory: Positive for cough. Negative for shortness of breath. Gastrointestinal: Negative for anorexia, diarrhea and vomiting. Musculoskeletal: Negative for joint pain. Skin: Positive for rash. Negative for nail changes. Allergies   Allergen Reactions    Pcn [Penicillins] Hives     Unknown reaction       Current Outpatient Medications   Medication Sig Dispense Refill    benztropine (COGENTIN) 0.5 MG tablet Take 1 tablet by mouth 2 times daily 60 tablet 5    paliperidone (INVEGA) 6 MG extended release tablet Take 1 tablet by mouth every morning 30 tablet 5    Multiple Vitamin (MULTIVITAMIN PO) Take 1 capsule by mouth daily      albuterol sulfate  (90 Base) MCG/ACT inhaler Inhale 2 puffs into the lungs every 6 hours as needed for Wheezing 1 Inhaler 11    tiotropium (SPIRIVA RESPIMAT) 2.5 MCG/ACT AERS inhaler Inhale 2 puffs into the lungs daily 1 Inhaler 6     No current facility-administered medications for this visit. Vitals:    01/25/22 0909   BP: 122/68   Site: Left Upper Arm   Position: Sitting   Pulse: 89   Temp: 96.9 °F (36.1 °C)   TempSrc: Temporal   SpO2: 98%   Weight: 94 lb 12.8 oz (43 kg)   Height: 5' 4\" (1.626 m)     Body mass index is 16.27 kg/m².      Wt Readings from Last 3 Encounters:   01/25/22 94 lb 12.8 oz (43 kg)   12/21/21 93 lb 6.4 oz (42.4 kg)   08/23/21 93 lb 9.6 oz (42.5 kg)     BP Readings from Last 3 Encounters:   01/25/22 122/68   12/21/21 100/70   08/23/21 110/80       Objective:   Physical Exam                Assessment/Plan:  Leo Silva was seen today for rash. Diagnoses and all orders for this visit:    Annular dermatitis  -     triamcinolone (KENALOG) 0.1 % ointment; Apply generous amount to body 3 times daily  -     ketoconazole (NIZORAL) 2 % shampoo; Apply topically daily as needed(use as wash daily      No follow-ups on file.                   Radha Liu MD

## 2022-01-31 ENCOUNTER — TELEPHONE (OUTPATIENT)
Dept: ADMINISTRATIVE | Age: 67
End: 2022-01-31

## 2022-04-11 ENCOUNTER — NURSE ONLY (OUTPATIENT)
Dept: INTERNAL MEDICINE CLINIC | Age: 67
End: 2022-04-11

## 2022-04-11 VITALS — WEIGHT: 94.6 LBS | BODY MASS INDEX: 16.24 KG/M2

## 2022-04-11 DIAGNOSIS — R63.4 WEIGHT LOSS, ABNORMAL: Primary | ICD-10-CM

## 2022-04-11 PROCEDURE — 99999 PR OFFICE/OUTPT VISIT,PROCEDURE ONLY: CPT | Performed by: INTERNAL MEDICINE

## 2022-06-20 RX ORDER — TIOTROPIUM BROMIDE INHALATION SPRAY 3.12 UG/1
SPRAY, METERED RESPIRATORY (INHALATION)
Qty: 12 G | Refills: 1 | Status: SHIPPED | OUTPATIENT
Start: 2022-06-20

## 2022-07-13 ENCOUNTER — OFFICE VISIT (OUTPATIENT)
Dept: PULMONOLOGY | Age: 67
End: 2022-07-13
Payer: COMMERCIAL

## 2022-07-13 VITALS — HEART RATE: 64 BPM | OXYGEN SATURATION: 96 %

## 2022-07-13 DIAGNOSIS — R91.1 PULMONARY NODULE: ICD-10-CM

## 2022-07-13 DIAGNOSIS — J43.2 CENTRILOBULAR EMPHYSEMA (HCC): ICD-10-CM

## 2022-07-13 DIAGNOSIS — B44.1 PULMONARY ASPERGILLOSIS (HCC): ICD-10-CM

## 2022-07-13 DIAGNOSIS — J47.9 BRONCHIECTASIS WITHOUT COMPLICATION (HCC): ICD-10-CM

## 2022-07-13 DIAGNOSIS — J44.9 COPD, MILD (HCC): Primary | ICD-10-CM

## 2022-07-13 PROCEDURE — G8427 DOCREV CUR MEDS BY ELIG CLIN: HCPCS | Performed by: INTERNAL MEDICINE

## 2022-07-13 PROCEDURE — 3023F SPIROM DOC REV: CPT | Performed by: INTERNAL MEDICINE

## 2022-07-13 PROCEDURE — 1123F ACP DISCUSS/DSCN MKR DOCD: CPT | Performed by: INTERNAL MEDICINE

## 2022-07-13 PROCEDURE — G8419 CALC BMI OUT NRM PARAM NOF/U: HCPCS | Performed by: INTERNAL MEDICINE

## 2022-07-13 PROCEDURE — 99214 OFFICE O/P EST MOD 30 MIN: CPT | Performed by: INTERNAL MEDICINE

## 2022-07-13 PROCEDURE — 3017F COLORECTAL CA SCREEN DOC REV: CPT | Performed by: INTERNAL MEDICINE

## 2022-07-13 PROCEDURE — 1036F TOBACCO NON-USER: CPT | Performed by: INTERNAL MEDICINE

## 2022-07-13 RX ORDER — ALBUTEROL SULFATE 90 UG/1
2 AEROSOL, METERED RESPIRATORY (INHALATION) EVERY 6 HOURS PRN
Qty: 18 G | Refills: 11 | Status: SHIPPED | OUTPATIENT
Start: 2022-07-13 | End: 2023-07-13

## 2022-07-13 NOTE — PROGRESS NOTES
[facility]  Follow Up Note  Lisa Hamilton MD       Sharlene Portillo   YOB: 1955    Date of Visit:  7/13/2022    Assessment/Plan:  1. COPD, mild ([address]  Stable  PFT 10/17:  INTERPRETATION:  Spirometry attempts were acceptable and reproducible. FVC was normal at 3.28 L, 88% predicted and decreased FEV1 of 1.99 L,  71% predicted. FEV1/FVC ratio was decreased at 61%. No response to  bronchodilators demonstrated on spirometry. Lung volumes showed  normal total lung capacity of 108% predicted. Diffusion capacity  showed decreased DLCO of 47% predicted.     IMPRESSION:  Mild obstructive defect on spirometry with no response to  bronchodilators. Normal lung volumes and moderate decrease in  diffusion capacity. Spiriva  Albuterol    2. Centrilobular emphysema (HCC)  Stable  Noted on CT imaging    3. Bronchiectasis without complication (HCC)  Stable  Also noted on CT imaging    4. Pulmonary nodule  Stable/Improved  CT Chest 12/21:    FINDINGS:   Mediastinum: Hypodense nodule is seen in the left lobe of thyroid measuring 8   mm.  No specific imaging follow-up recommended based on size.  Small   mediastinal nodes are noted, calcified and noncalcified.  Moderate coronary   artery calcification is seen.  Trace pericardial fluid is seen.  Small hiatal   hernia seen. Jesus Duff is nonspecific thickening at the GE junction       Lungs/pleura: Respiratory motion artifact limits evaluation of fine pulmonary   parenchymal change.  Severe pulmonary emphysema is identified.       No new pulmonary nodules are seen on the left.  Bandlike opacity seen in left   lung base.  Trace left-sided pleural effusion is seen. Merla Bloch is mild   bronchiectasis       Dominant pulmonary nodule right upper lobe is seen, measuring 1.8 cm x 1.2   cm, similar to prior similar to prior. .  This has subtle areas of increased   density internally which could represent early calcification       An adjacent smaller pulmonary nodule in the right upper lobe is unchanged. Bronchiectatic changes are seen on the right.       Nodular density anteriorly in the right costophrenic angle is somewhat   smaller, measuring 7 mm x 6 mm, previously 12 mm x 7 mm       Upper Abdomen: Right adrenal gland is normal.  Left adrenal gland is normal.   Calcified granuloma seen within the spleen.  Atherosclerotic change seen in   abdominal aorta       Soft Tissues/Bones: Spurring is seen in the spine.  Spurring is seen in the   shoulder joints.           Impression   Right lower lobe pulmonary nodule which was new December 2020, appears   smaller on today's study.       Other pulmonary nodules appear unchanged, with the largest of which being   seen in the right upper lobe. Marisa Martinez is a background of pulmonary emphysema       Moderate coronary artery calcification. Repeat CT chest around 1/23    5. Pulmonary aspergillosis  Stopped his antifungal medication after about 2 years due to side effects. I asked him to go back to see Dr Jason Patrick once more. He is asymptomatic    6. Immunization  COVID UTD      Chief Complaint   Patient presents with    Shortness of Breath       HPI  The patient presents with a chief complaint of moderate shortness of breath related to mild COPD of many years duration. He has mild associated cough. Exertion is a modifying factor. Has a history of pulmonary aspergillosis and has been treated for this. We have been following a pulmonary nodule on CT imaging. Current imaging shows a new or enlarging right lower lobe pulmonary nodule. Review of Systems  As reviewed in HPI    History  I have reviewed past medical, surgical, social and family history. This is documented elsewhere in the medical record. Physical Exam:  Well developed, well nourished  Alert and oriented  Sclera is clear  No cervical adenopathy  No JVD. Chest examination is clear.   Cardiac examination reveals regular rate and rhythm without murmur, gallop or rub. The abdomen is soft, nontender and nondistended. There is no clubbing, cyanosis or edema of the extremities. There is no obvious skin rash. No focal neuro deficicts  Normal mood and affect    Allergies   Allergen Reactions    Pcn [Penicillins] Hives     Unknown reaction     Prior to Visit Medications    Medication Sig Taking? Authorizing Provider   albuterol sulfate HFA (PROVENTIL;VENTOLIN;PROAIR) 108 (90 Base) MCG/ACT inhaler Inhale 2 puffs into the lungs every 6 hours as needed for Wheezing Yes Ivy Jones MD   SPIRIVA RESPIMAT 2.5 MCG/ACT AERS inhaler INHALE 2 PUFFS INTO THE LUNGS DAILY  Ivy Jones MD   triamcinolone (KENALOG) 0.1 % ointment Apply generous amount to body 3 times daily  Joe Goldberg, MD   ketoconazole (NIZORAL) 2 % shampoo Apply topically daily as needed(use as wash daily  Joe Goldberg, MD   benztropine (COGENTIN) 0.5 MG tablet Take 1 tablet by mouth 2 times daily  Joe Goldberg, MD   paliperidone (INVEGA) 6 MG extended release tablet Take 1 tablet by mouth every morning  Joe Goldberg, MD   Multiple Vitamin (MULTIVITAMIN PO) Take 1 capsule by mouth daily  Historical Provider, MD   albuterol sulfate  (90 Base) MCG/ACT inhaler Inhale 2 puffs into the lungs every 6 hours as needed for Wheezing  Ivy Jones MD       Vitals:    22 0905   Pulse: 64   SpO2: 96%     There is no height or weight on file to calculate BMI.      Wt Readings from Last 3 Encounters:   22 94 lb 9.6 oz (42.9 kg)   22 94 lb 12.8 oz (43 kg)   21 93 lb 6.4 oz (42.4 kg)     BP Readings from Last 3 Encounters:   22 122/68   21 100/70   21 110/80        Social History     Tobacco Use   Smoking Status Former Smoker    Packs/day: 2.00    Years: 35.00    Pack years: 70.00    Types: Cigarettes    Quit date: 2013    Years since quittin.5   Smokeless Tobacco Never Used

## 2022-10-22 ENCOUNTER — IMMUNIZATION (OUTPATIENT)
Dept: INTERNAL MEDICINE CLINIC | Age: 67
End: 2022-10-22
Payer: MEDICARE

## 2022-10-22 DIAGNOSIS — Z23 FLU VACCINE NEED: Primary | ICD-10-CM

## 2022-10-22 PROCEDURE — 90694 VACC AIIV4 NO PRSRV 0.5ML IM: CPT | Performed by: INTERNAL MEDICINE

## 2022-10-22 PROCEDURE — G0008 ADMIN INFLUENZA VIRUS VAC: HCPCS | Performed by: INTERNAL MEDICINE

## 2022-10-28 ENCOUNTER — TELEPHONE (OUTPATIENT)
Dept: INTERNAL MEDICINE CLINIC | Age: 67
End: 2022-10-28

## 2022-10-28 NOTE — TELEPHONE ENCOUNTER
----- Message from Rico December sent at 10/28/2022  1:50 PM EDT -----  Subject: Message to Provider    QUESTIONS  Information for Provider? Patient has history of COPD. He went to [facility]   to get a pneumonia shot on 10/22 & they advised that he did not need any   more of those. The CDC he does not need this. Please advise if this is   correct? If he needs this they will need a script so he can get this shot.     ---------------------------------------------------------------------------  --------------  Og LOVE  [phone]; OK to leave message on voicemail  ---------------------------------------------------------------------------  --------------  SCRIPT ANSWERS  Relationship to Patient? Other  Representative Name? Lila Miller  Is the Representative on the appropriate HIPAA document in Epic?  Yes

## 2022-10-31 NOTE — TELEPHONE ENCOUNTER
Pt states that he needs paperwork stating it is medically needed for the pharmacy to administer the vaccine.

## 2022-11-01 NOTE — TELEPHONE ENCOUNTER
Please let Mr. Jackson Delaney know I have reviewed his record and he dose not need a pneumococcal vaccine this year. Please ask him to discuss it with his pulmonologist at his next visit.

## 2022-11-14 ENCOUNTER — TELEPHONE (OUTPATIENT)
Dept: PULMONOLOGY | Age: 67
End: 2022-11-14

## 2022-11-14 NOTE — TELEPHONE ENCOUNTER
Pt called and said that his pcp said he did not need to get pneumonia shot, but to check with us. Call him back to let him know if doctor wants him to get it.     Ph # [phone]

## 2022-12-19 ENCOUNTER — TELEPHONE (OUTPATIENT)
Dept: PULMONOLOGY | Age: 67
End: 2022-12-19

## 2022-12-19 DIAGNOSIS — F31.76 BIPOLAR DISORDER, IN FULL REMISSION, MOST RECENT EPISODE DEPRESSED (HCC): ICD-10-CM

## 2022-12-19 RX ORDER — TIOTROPIUM BROMIDE INHALATION SPRAY 3.12 UG/1
SPRAY, METERED RESPIRATORY (INHALATION)
Qty: 12 G | Refills: 1 | Status: SHIPPED | OUTPATIENT
Start: 2022-12-19

## 2022-12-19 NOTE — TELEPHONE ENCOUNTER
----- Message from Mayra Showers sent at 12/19/2022  9:28 AM EST -----  Subject: Refill Request    QUESTIONS  Name of Medication? SPIRIVA RESPIMAT 2.5 MCG/ACT AERS inhaler  Patient-reported dosage and instructions? 2 puff inhale qd  How many days do you have left? 0  Preferred Pharmacy? [facility] #[id number]  Pharmacy phone number (if available)? [phone]  ---------------------------------------------------------------------------  --------------,  Name of Medication? paliperidone (INVEGA) 6 MG extended release tablet  Patient-reported dosage and instructions? 1 tab po qd  How many days do you have left? 0  Preferred Pharmacy? [facility] #[id number]  Pharmacy phone number (if available)? [phone]  ---------------------------------------------------------------------------  --------------,  Name of Medication? benztropine (COGENTIN) 0.5 MG tablet  Patient-reported dosage and instructions? 1 tab po qd  How many days do you have left? 0  Preferred Pharmacy? [facility] #[id number]  Pharmacy phone number (if available)? [phone]  ---------------------------------------------------------------------------  --------------  Jorge Argue INFO  What is the best way for the office to contact you? OK to leave message on   voicemail  Preferred Call Back Phone Number? [phone]  ---------------------------------------------------------------------------  --------------  SCRIPT ANSWERS  Relationship to Patient?  Self

## 2022-12-19 NOTE — TELEPHONE ENCOUNTER
Patient is requesting a refill of their prescription.     Requested Prescriptions     Pending Prescriptions Disp Refills    tiotropium (SPIRIVA RESPIMAT) 2.5 MCG/ACT AERS inhaler 12 g 1     Sig: INHALE 2 PUFFS INTO THE LUNGS DAILY    paliperidone (INVEGA) 6 MG extended release tablet 30 tablet 5     Sig: Take 1 tablet by mouth every morning    benztropine (COGENTIN) 0.5 MG tablet 60 tablet 5     Sig: Take 1 tablet by mouth 2 times daily        Recent Visits  Date Type Provider Dept   01/25/22 Office Visit Kulwant Baker MD [facility]   12/21/21 Office Visit Kulwant Baker MD [facility]   08/23/21 Office Visit Kulwant Baker MD [facility]   Showing recent visits within past 540 days with a meds authorizing provider and meeting all other requirements  Future Appointments  Date Type Provider Dept   01/06/23 Appointment Kulwant Baker MD [facility]   Showing future appointments within next 150 days with a meds authorizing provider and meeting all other requirements     1/25/2022

## 2022-12-19 NOTE — TELEPHONE ENCOUNTER
Patient called and said he is out of his Spiriva and needs a refill.       Marci Robertson 2.5 MCG/ACT AERS inhaler     [facility] [address] [place] [address] [place] [address] [phone] - F [phone]     [address]: [phone]

## 2022-12-20 RX ORDER — PALIPERIDONE 6 MG/1
6 TABLET, EXTENDED RELEASE ORAL EVERY MORNING
Qty: 30 TABLET | Refills: 0 | Status: SHIPPED | OUTPATIENT
Start: 2022-12-20

## 2022-12-20 RX ORDER — BENZTROPINE MESYLATE 0.5 MG/1
0.5 TABLET ORAL 2 TIMES DAILY
Qty: 60 TABLET | Refills: 0 | Status: SHIPPED | OUTPATIENT
Start: 2022-12-20

## 2023-01-05 ENCOUNTER — HOSPITAL ENCOUNTER (OUTPATIENT)
Dept: CT IMAGING | Age: 68
Discharge: HOME | End: 2023-01-05
Payer: COMMERCIAL

## 2023-01-05 ENCOUNTER — OFFICE VISIT (OUTPATIENT)
Dept: INTERNAL MEDICINE CLINIC | Age: 68
End: 2023-01-05

## 2023-01-05 VITALS
BODY MASS INDEX: 16.46 KG/M2 | HEART RATE: 70 BPM | HEIGHT: 64 IN | WEIGHT: 96.4 LBS | SYSTOLIC BLOOD PRESSURE: 110 MMHG | DIASTOLIC BLOOD PRESSURE: 70 MMHG | OXYGEN SATURATION: 98 %

## 2023-01-05 DIAGNOSIS — R93.3 ABNORMAL FINDINGS ON DIAGNOSTIC IMAGING OF OTHER PARTS OF DIGESTIVE TRACT: ICD-10-CM

## 2023-01-05 DIAGNOSIS — Z71.89 ACP (ADVANCE CARE PLANNING): Primary | ICD-10-CM

## 2023-01-05 DIAGNOSIS — F31.76 BIPOLAR DISORDER, IN FULL REMISSION, MOST RECENT EPISODE DEPRESSED (HCC): ICD-10-CM

## 2023-01-05 DIAGNOSIS — E41 NUTRITIONAL MARASMUS (HCC): ICD-10-CM

## 2023-01-05 DIAGNOSIS — J43.2 CENTRILOBULAR EMPHYSEMA (HCC): ICD-10-CM

## 2023-01-05 DIAGNOSIS — D80.1 HYPOGAMMAGLOBULINEMIA (HCC): ICD-10-CM

## 2023-01-05 DIAGNOSIS — Z00.00 MEDICARE ANNUAL WELLNESS VISIT, SUBSEQUENT: ICD-10-CM

## 2023-01-05 DIAGNOSIS — R91.1 PULMONARY NODULE: ICD-10-CM

## 2023-01-05 LAB
A/G RATIO: 2.1 (ref 1.1–2.2)
ALBUMIN SERPL-MCNC: 4.4 G/DL (ref 3.4–5)
ALP BLD-CCNC: 56 U/L (ref 40–129)
ALT SERPL-CCNC: 22 U/L (ref 10–40)
ANION GAP SERPL CALCULATED.3IONS-SCNC: 13 MMOL/L (ref 3–16)
AST SERPL-CCNC: 26 U/L (ref 15–37)
BASOPHILS ABSOLUTE: 0.1 K/UL (ref 0–0.2)
BASOPHILS RELATIVE PERCENT: 0.8 %
BILIRUB SERPL-MCNC: 0.6 MG/DL (ref 0–1)
BUN BLDV-MCNC: 8 MG/DL (ref 7–20)
CALCIUM SERPL-MCNC: 9.7 MG/DL (ref 8.3–10.6)
CHLORIDE BLD-SCNC: 103 MMOL/L (ref 99–110)
CHOLESTEROL, FASTING: 164 MG/DL (ref 0–199)
CO2: 24 MMOL/L (ref 21–32)
CREAT SERPL-MCNC: 0.7 MG/DL (ref 0.8–1.3)
EOSINOPHILS ABSOLUTE: 0.1 K/UL (ref 0–0.6)
EOSINOPHILS RELATIVE PERCENT: 1.1 %
GFR SERPL CREATININE-BSD FRML MDRD: >60 ML/MIN/{1.73_M2}
GLUCOSE FASTING: 77 MG/DL (ref 70–99)
HCT VFR BLD CALC: 49.1 % (ref 40.5–52.5)
HDLC SERPL-MCNC: 53 MG/DL (ref 40–60)
HEMOGLOBIN: 16.5 G/DL (ref 13.5–17.5)
LDL CHOLESTEROL CALCULATED: 97 MG/DL
LYMPHOCYTES ABSOLUTE: 1.6 K/UL (ref 1–5.1)
LYMPHOCYTES RELATIVE PERCENT: 24.2 %
MCH RBC QN AUTO: 31.3 PG (ref 26–34)
MCHC RBC AUTO-ENTMCNC: 33.7 G/DL (ref 31–36)
MCV RBC AUTO: 93 FL (ref 80–100)
MONOCYTES ABSOLUTE: 0.6 K/UL (ref 0–1.3)
MONOCYTES RELATIVE PERCENT: 8.4 %
NEUTROPHILS ABSOLUTE: 4.4 K/UL (ref 1.7–7.7)
NEUTROPHILS RELATIVE PERCENT: 65.5 %
PDW BLD-RTO: 13.4 % (ref 12.4–15.4)
PLATELET # BLD: 253 K/UL (ref 135–450)
PMV BLD AUTO: 8.1 FL (ref 5–10.5)
POTASSIUM SERPL-SCNC: 4.2 MMOL/L (ref 3.5–5.1)
RBC # BLD: 5.28 M/UL (ref 4.2–5.9)
SODIUM BLD-SCNC: 140 MMOL/L (ref 136–145)
TOTAL PROTEIN: 6.5 G/DL (ref 6.4–8.2)
TRIGLYCERIDE, FASTING: 71 MG/DL (ref 0–150)
TSH SERPL DL<=0.05 MIU/L-ACNC: 3.55 UIU/ML (ref 0.27–4.2)
VLDLC SERPL CALC-MCNC: 14 MG/DL
WBC # BLD: 6.7 K/UL (ref 4–11)

## 2023-01-05 PROCEDURE — 71250 CT THORAX DX C-: CPT

## 2023-01-05 SDOH — ECONOMIC STABILITY: FOOD INSECURITY: WITHIN THE PAST 12 MONTHS, THE FOOD YOU BOUGHT JUST DIDN'T LAST AND YOU DIDN'T HAVE MONEY TO GET MORE.: NEVER TRUE

## 2023-01-05 SDOH — ECONOMIC STABILITY: FOOD INSECURITY: WITHIN THE PAST 12 MONTHS, YOU WORRIED THAT YOUR FOOD WOULD RUN OUT BEFORE YOU GOT MONEY TO BUY MORE.: NEVER TRUE

## 2023-01-05 ASSESSMENT — PATIENT HEALTH QUESTIONNAIRE - PHQ9
8. MOVING OR SPEAKING SO SLOWLY THAT OTHER PEOPLE COULD HAVE NOTICED. OR THE OPPOSITE, BEING SO FIGETY OR RESTLESS THAT YOU HAVE BEEN MOVING AROUND A LOT MORE THAN USUAL: 0
4. FEELING TIRED OR HAVING LITTLE ENERGY: 0
SUM OF ALL RESPONSES TO PHQ9 QUESTIONS 1 & 2: 0
9. THOUGHTS THAT YOU WOULD BE BETTER OFF DEAD, OR OF HURTING YOURSELF: 0
3. TROUBLE FALLING OR STAYING ASLEEP: 0
SUM OF ALL RESPONSES TO PHQ QUESTIONS 1-9: 0
SUM OF ALL RESPONSES TO PHQ QUESTIONS 1-9: 0
6. FEELING BAD ABOUT YOURSELF - OR THAT YOU ARE A FAILURE OR HAVE LET YOURSELF OR YOUR FAMILY DOWN: 0
2. FEELING DOWN, DEPRESSED OR HOPELESS: 0
5. POOR APPETITE OR OVEREATING: 0
SUM OF ALL RESPONSES TO PHQ QUESTIONS 1-9: 0
7. TROUBLE CONCENTRATING ON THINGS, SUCH AS READING THE NEWSPAPER OR WATCHING TELEVISION: 0
SUM OF ALL RESPONSES TO PHQ QUESTIONS 1-9: 0
1. LITTLE INTEREST OR PLEASURE IN DOING THINGS: 0
10. IF YOU CHECKED OFF ANY PROBLEMS, HOW DIFFICULT HAVE THESE PROBLEMS MADE IT FOR YOU TO DO YOUR WORK, TAKE CARE OF THINGS AT HOME, OR GET ALONG WITH OTHER PEOPLE: 0

## 2023-01-05 ASSESSMENT — SOCIAL DETERMINANTS OF HEALTH (SDOH): HOW HARD IS IT FOR YOU TO PAY FOR THE VERY BASICS LIKE FOOD, HOUSING, MEDICAL CARE, AND HEATING?: NOT HARD AT ALL

## 2023-01-05 ASSESSMENT — ANXIETY QUESTIONNAIRES
2. NOT BEING ABLE TO STOP OR CONTROL WORRYING: 0
IF YOU CHECKED OFF ANY PROBLEMS ON THIS QUESTIONNAIRE, HOW DIFFICULT HAVE THESE PROBLEMS MADE IT FOR YOU TO DO YOUR WORK, TAKE CARE OF THINGS AT HOME, OR GET ALONG WITH OTHER PEOPLE: NOT DIFFICULT AT ALL
5. BEING SO RESTLESS THAT IT IS HARD TO SIT STILL: 0
4. TROUBLE RELAXING: 0
GAD7 TOTAL SCORE: 0
1. FEELING NERVOUS, ANXIOUS, OR ON EDGE: 0
6. BECOMING EASILY ANNOYED OR IRRITABLE: 0
3. WORRYING TOO MUCH ABOUT DIFFERENT THINGS: 0
7. FEELING AFRAID AS IF SOMETHING AWFUL MIGHT HAPPEN: 0

## 2023-01-05 ASSESSMENT — LIFESTYLE VARIABLES
HOW MANY STANDARD DRINKS CONTAINING ALCOHOL DO YOU HAVE ON A TYPICAL DAY: PATIENT DOES NOT DRINK
HOW OFTEN DO YOU HAVE A DRINK CONTAINING ALCOHOL: NEVER

## 2023-01-05 NOTE — PATIENT INSTRUCTIONS
Learning About Being Active as an Older Adult  Why is being active important as you get older? Being active is one of the best things you can do for your health. And it's never too late to start. Being active--or getting active, if you aren't already--has definite benefits. It can:  Give you more energy,  Keep your mind sharp. Improve balance to reduce your risk of falls. Help you manage chronic illness with fewer medicines. No matter how old you are, how fit you are, or what health problems you have, there is a form of activity that will work for you. And the more physical activity you can do, the better your overall health will be. What kinds of activity can help you stay healthy? Being more active will make your daily activities easier. Physical activity includes planned exercise and things you do in daily life. There are four types of activity:  Aerobic. Doing aerobic activity makes your heart and lungs strong. Includes walking, dancing, and gardening. Aim for at least 2½ hours spread throughout the week. It improves your energy and can help you sleep better. Muscle-strengthening. This type of activity can help maintain muscle and strengthen bones. Includes climbing stairs, using resistance bands, and lifting or carrying heavy loads. Aim for at least twice a week. It can help protect the knees and other joints. Stretching. Stretching gives you better range of motion in joints and muscles. Includes upper arm stretches, calf stretches, and gentle yoga. Aim for at least twice a week, preferably after your muscles are warmed up from other activities. It can help you function better in daily life. Balancing. This helps you stay coordinated and have good posture. Includes heel-to-toe walking, anayeli chi, and certain types of yoga. Aim for at least 3 days a week. It can reduce your risk of falling.   Even if you have a hard time meeting the recommendations, it's better to be more active than less active. All activity done in each category counts toward your weekly total. You'd be surprised how daily things like carrying groceries, keeping up with grandchildren, and taking the stairs can add up. What keeps you from being active? If you've had a hard time being more active, you're not alone. Maybe you remember being able to do more. Or maybe you've never thought of yourself as being active. It's frustrating when you can't do the things you want. Being more active can help. What's holding you back? Getting started. Have a goal, but break it into easy tasks. Small steps build into big accomplishments. Staying motivated. If you feel like skipping your activity, remember your goal. Maybe you want to move better and stay independent. Every activity gets you one step closer. Not feeling your best.  Start with 5 minutes of an activity you enjoy. Prove to yourself you can do it. As you get comfortable, increase your time. You may not be where you want to be. But you're in the process of getting there. Everyone starts somewhere. How can you find safe ways to stay active? Talk with your doctor about any physical challenges you're facing. Make a plan with your doctor if you have a health problem or aren't sure how to get started with activity. If you're already active, ask your doctor if there is anything you should change to stay safe as your body and health change. If you tend to feel dizzy after you take medicine, avoid activity at that time. Try being active before you take your medicine. This will reduce your risk of falls. If you plan to be active at home, make sure to clear your space before you get started. Remove things like TV cords, coffee tables, and throw rugs. It's safest to have plenty of space to move freely. The key to getting more active is to take it slow and steady. Try to improve only a little bit at a time.  Pick just one area to improve on at first. And if an activity hurts, stop and talk to your doctor. Where can you learn more? Go to http://www.kumar.com/ and enter P600 to learn more about \"Learning About Being Active as an Older Adult. \"  Current as of: October 10, 2022               Content Version: 13.5  © 6870-6342 Healthwise, Incorporated. Care instructions adapted under license by [facility] ([facility]). If you have questions about a medical condition or this instruction, always ask your healthcare professional. [place] [address] any warranty or liability for your use of this information. Learning About Dental Care for Older Adults  Dental care for older adults: Overview  Dental care for older people is much the same as for younger adults. But older adults do have concerns that younger adults do not. Older adults may have problems with gum disease and decay on the roots of their teeth. They may need missing teeth replaced or broken fillings fixed. Or they may have dentures that need to be cared for. Some older adults may have trouble holding a toothbrush. You can help remind the person you are caring for to brush and floss their teeth or to clean their dentures. In some cases, you may need to do the brushing and other dental care tasks. People who have trouble using their hands or who have dementia may need this extra help. How can you help with dental care? Normal dental care  To keep the teeth and gums healthy:  Brush the teeth with fluoride toothpaste twice a day--in the morning and at night--and floss at least once a day. Plaque can quickly build up on the teeth of older adults. Watch for the signs of gum disease. These signs include gums that bleed after brushing or after eating hard foods, such as apples. See a dentist regularly. Many experts recommend checkups every 6 months. Keep the dentist up to date on any new medications the person is taking.   Encourage a balanced diet that includes whole grains, vegetables, and fruits, and that is low in saturated fat and sodium.  Encourage the person you're caring for not to use tobacco products. They can affect dental and general health.  Many older adults have a fixed income and feel that they can't afford dental care. But most towns and [facility] have programs in which dentists help older adults by lowering fees. Contact your area's public health offices or  for information about dental care in your area.  Using a toothbrush  Older adults with arthritis sometimes have trouble brushing their teeth because they can't easily hold the toothbrush. Their hands and fingers may be stiff, painful, or weak. If this is the case, you can:  Offer an electric toothbrush.  Enlarge the handle of a non-electric toothbrush by wrapping a sponge, an elastic bandage, or adhesive tape around it.  Push the toothbrush handle through a ball made of rubber or soft foam.  Make the handle longer and thicker by taping Popsicle sticks or tongue depressors to it.  You may also be able to buy special toothbrushes, toothpaste dispensers, and floss holders.  Your doctor may recommend a soft-bristle toothbrush if the person you care for bleeds easily. Bleeding can happen because of a health problem or from certain medicines.  A toothpaste for sensitive teeth may help if the person you care for has sensitive teeth.  How do you brush and floss someone's teeth?  If the person you are caring for has a hard time cleaning their teeth on their own, you may need to brush and floss their teeth for them. It may be easiest to have the person sit and face away from you, and to sit or stand behind them. That way you can steady their head against your arm as you reach around to floss and brush their teeth. Choose a place that has good lighting and is comfortable for both of you.  Before you begin, gather your supplies. You will need gloves, floss, a toothbrush, and a container to hold water if you are not near a sink. Wash and dry  your hands well and put on gloves. Start by flossing:  Gently work a piece of floss between each of the teeth toward the gums. A plastic flossing tool may make this easier, and they are available at most [facility]. Curve the floss around each tooth into a U-shape and gently slide it under the gum line. Move the floss firmly up and down several times to scrape off the plaque. After you've finished flossing, throw away the used floss and begin brushing:  Wet the brush and apply toothpaste. Place the brush at a 45-degree angle where the teeth meet the gums. Press firmly, and move the brush in small circles over the surface of the teeth. Be careful not to brush too hard. Vigorous brushing can make the gums pull away from the teeth and can scratch the tooth enamel. Brush all surfaces of the teeth, on the tongue side and on the cheek side. Pay special attention to the front teeth and all surfaces of the back teeth. Brush chewing surfaces with short back-and-forth strokes. After you've finished, help the person rinse the remaining toothpaste from their mouth. Where can you learn more? Go to http://www.woods.com/ and enter F944 to learn more about \"Learning About Dental Care for Older Adults. \"  Current as of: June 16, 2022               Content Version: 13.5  © 4910-5689 Healthwise, Incorporated. Care instructions adapted under license by [facility] ([facility]). If you have questions about a medical condition or this instruction, always ask your healthcare professional. [place] [address] any warranty or liability for your use of this information. Advance Directives: Care Instructions  Overview  An advance directive is a legal way to state your wishes at the end of your life. It tells your family and your doctor what to do if you can't say what you want. There are two main types of advance directives. You can change them any time your wishes change. Living will.   This form tells your family and your doctor your wishes about life support and other treatment. The form is also called a declaration. Medical power of . This form lets you name a person to make treatment decisions for you when you can't speak for yourself. This person is called a health care agent (health care proxy, health care surrogate). The form is also called a durable power of  for health care. If you do not have an advance directive, decisions about your medical care may be made by a family member, or by a doctor or a  who doesn't know you. It may help to think of an advance directive as a gift to the people who care for you. If you have one, they won't have to make tough decisions by themselves. For more information, including forms for your state, see the [address] website (www.caringinfo.org/planning/advance-directives/). Follow-up care is a key part of your treatment and safety. Be sure to make and go to all appointments, and call your doctor if you are having problems. It's also a good idea to know your test results and keep a list of the medicines you take. What should you include in an advance directive? Many states have a unique advance directive form. (It may ask you to address specific issues.) Or you might use a universal form that's approved by many states. If your form doesn't tell you what to address, it may be hard to know what to include in your advance directive. Use the questions below to help you get started. Who do you want to make decisions about your medical care if you are not able to? What life-support measures do you want if you have a serious illness that gets worse over time or can't be cured? What are you most afraid of that might happen? (Maybe you're afraid of having pain, losing your independence, or being kept alive by machines.)  Where would you prefer to die? (Your home? A hospital? A nursing home?)  Do you want to donate your organs when you die?   Do you want certain Taoist practices performed before you die? When should you call for help? Be sure to contact your doctor if you have any questions. Where can you learn more? Go to http://www.kumar.com/ and enter R264 to learn more about \"Advance Directives: Care Instructions. \"  Current as of: June 16, 2022               Content Version: 13.5  © 3539-6337 [employer]. Care instructions adapted under license by [facility] ([facility]). If you have questions about a medical condition or this instruction, always ask your healthcare professional. [address] any warranty or liability for your use of this information. 7 Tips for Eating Healthy When [facility] All the Time    Make quick meals on busy nights. Try recipes that are simple to make and easy to clean up, like pasta, soups, or casseroles. These dishes can often be made ahead of time and are easy to reheat when you're short on time. Buy foods that last.  Choose fresh foods, such as onions, garlic, and potatoes. You can also reach for frozen, canned, and dried foods. Foods like these last and can help you pull a healthy meal together quickly. Nikunj Gauthier something new. Try checking out cookbooks from [employer]. Or search for new recipes online. You can also change the ingredients in an old favorite recipe. Or switch the seasonings to create something new. Try theme nights. Try \"Taco Tuesday. \" Do \"Meatless Monday\" for a vegetarian meal each week. And save \"Leftovers Night\" for days when you don't want to cook. Let your favorite dishes guide you. Then make them again every few weeks. Nikunj Gauthier with a friend. Maybe you know someone who's feeling the same way about healthy eating. Pick a recipe and schedule a night to make it \"together. \" You can also video call while you cook or eat. Share food with other people. If you like cooking and baking, you can share what you've made.  Split up what you've made and keep only what you want to eat. You can wrap up the rest to share with a friend, neighbor, or family member. Aim for balance, variety, and moderation. On most days, eat from each food group--grains, protein foods, vegetables, fruits, and dairy. And choose different foods from each group. For example, if you often eat apples, try reaching for a banana instead. All foods, if you eat them in moderation, can be part of healthy eating. Current as of: May 9, 2022               Content Version: 13.5  © 2006-2022 Healthwise, [employer]. Care instructions adapted under license by [facility] ([facility]). If you have questions about a medical condition or this instruction, always ask your healthcare professional. [address] any warranty or liability for your use of this information. Personalized Preventive Plan for Agnes Powell - 1/5/2023  Medicare offers a range of preventive health benefits. Some of the tests and screenings are paid in full while other may be subject to a deductible, co-insurance, and/or copay. Some of these benefits include a comprehensive review of your medical history including lifestyle, illnesses that may run in your family, and various assessments and screenings as appropriate. After reviewing your medical record and screening and assessments performed today your provider may have ordered immunizations, labs, imaging, and/or referrals for you. A list of these orders (if applicable) as well as your Preventive Care list are included within your After Visit Summary for your review. Other Preventive Recommendations:    A preventive eye exam performed by an eye specialist is recommended every 1-2 years to screen for glaucoma; cataracts, macular degeneration, and other eye disorders. A preventive dental visit is recommended every 6 months. Try to get at least 150 minutes of exercise per week or 10,000 steps per day on a pedometer .   Order or download the FREE \"Exercise & Physical Activity: Your Everyday Guide\" from [employer]. Call 5-466.311.7485 or search [employer] online. You need 3276-0920 mg of calcium and 4484-4791 IU of vitamin D per day. It is possible to meet your calcium requirement with diet alone, but a vitamin D supplement is usually necessary to meet this goal.  When exposed to the sun, use a sunscreen that protects against both UVA and UVB radiation with an SPF of 30 or greater. Reapply every 2 to 3 hours or after sweating, drying off with a towel, or swimming. Always wear a seat belt when traveling in a car. Always wear a helmet when riding a bicycle or motorcycle.

## 2023-01-06 ENCOUNTER — CLINICAL DOCUMENTATION (OUTPATIENT)
Dept: SPIRITUAL SERVICES | Age: 68
End: 2023-01-06

## 2023-01-06 NOTE — ACP (ADVANCE CARE PLANNING)
Advance Care Planning   Ambulatory ACP Specialist Patient Outreach    Date:  1/6/2023  ACP Specialist:  Maxim Salgado    Outreach call to patient in follow-up to ACP Specialist referral from: Patricia Moon MD    [x] PCP  [] Provider   [] Ambulatory Care Management [] Other for Reason:    [x] Advance Directive Assistance  [] Code Status Discussion  [] Complete Portable DNR Order  [] Discuss Goals of Care  [] Complete POST/MOST  [] Early ACP Decision-Making  [] Other    Date Referral Received:1/5/23    Today's Outreach:  [x] First   [] Second  [] Third                               Third outreach made by []  phone  [] email []   [email or web address]     Intervention:  [x] Spoke with Patient  [] Left VM requesting return call      Outcome: Scheduled ACP Conversation Call for Patient and Patient's Spouse Dolly Massey on Monday January 23rd at [address] to be Mailed for both. Patient and Spouse do not have Email. Next Step:   [x] ACP scheduled conversation  [] Outreach again in one week               [x] Email / Mail ACP Info Sheets  [x] Email / Mail Advance Directive            [] Close Referral. Routing closure to referring provider/staff and to ACP Specialist . [] Closure Letter mailed to Patient with Invitation to Contact ACP Specialist if/when ready.     Thank you for this referral.

## 2023-01-10 NOTE — RESULT ENCOUNTER NOTE
Please call patient  CT scan now showing some new calcification in the right upper lobe pulmonary nodule. This is now considered benign.   4 mm noncalcified nodule is unchanged compared to previous  Repeat CT scan in 1 year

## 2023-01-20 ENCOUNTER — CLINICAL DOCUMENTATION (OUTPATIENT)
Dept: SPIRITUAL SERVICES | Age: 68
End: 2023-01-20

## 2023-01-20 NOTE — ACP (ADVANCE CARE PLANNING)
Advance Care Planning   Ambulatory ACP Specialist Patient Outreach    Date:  1/20/2023  ACP Specialist:  EDUARDO Reyna    Outreach call to patient in follow-up to ACP Specialist referral from: Pavel Nation MD    [x] PCP  [] Provider   [] Ambulatory Care Management [] Other for Reason:    [x] Advance Directive Assistance  [] Code Status Discussion  [] Complete Portable DNR Order  [] Discuss Goals of Care  [] Complete POST/MOST  [] Early ACP Decision-Making  [] Other    Date Referral Received: 01/05/2023    Today's Outreach:  [] First   [] Second  [] Third    [x] Reminder call           Third outreach made by []  phone  [] email []   [employer]     Intervention:  [x] Spoke with Patient  [] Left VM requesting return call      Outcome: ACP specialist made a reminder call to pt for him & his wives ACP conversation for Monday 01/23 at 11am.  Pt did confirm his appt for himself, but declined his wife's appt, as she is not going to be present d/t being at work. Pt did confirm that he rcvd his packet that was sent to him in the mail. Next Step:   [] ACP scheduled conversation  [] Outreach again in one week               [] Email / Mail ACP Info Sheets  [] Email / Mail Advance Directive            [] Close Referral. Routing closure to referring provider/staff and to ACP Specialist . [] Closure Letter mailed to Patient with Invitation to Contact ACP Specialist if/when ready.     Thank you for this referral.

## 2023-01-23 ENCOUNTER — OFFICE VISIT (OUTPATIENT)
Dept: PULMONOLOGY | Age: 68
End: 2023-01-23
Payer: COMMERCIAL

## 2023-01-23 ENCOUNTER — CLINICAL DOCUMENTATION (OUTPATIENT)
Dept: SPIRITUAL SERVICES | Age: 68
End: 2023-01-23

## 2023-01-23 VITALS — OXYGEN SATURATION: 95 % | HEART RATE: 86 BPM

## 2023-01-23 DIAGNOSIS — J44.9 COPD, MILD (HCC): Primary | ICD-10-CM

## 2023-01-23 DIAGNOSIS — B44.1 PULMONARY ASPERGILLOSIS (HCC): ICD-10-CM

## 2023-01-23 DIAGNOSIS — J43.2 CENTRILOBULAR EMPHYSEMA (HCC): ICD-10-CM

## 2023-01-23 DIAGNOSIS — J47.9 BRONCHIECTASIS WITHOUT COMPLICATION (HCC): ICD-10-CM

## 2023-01-23 PROCEDURE — 1123F ACP DISCUSS/DSCN MKR DOCD: CPT | Performed by: INTERNAL MEDICINE

## 2023-01-23 PROCEDURE — 99213 OFFICE O/P EST LOW 20 MIN: CPT | Performed by: INTERNAL MEDICINE

## 2023-01-23 PROCEDURE — G8484 FLU IMMUNIZE NO ADMIN: HCPCS | Performed by: INTERNAL MEDICINE

## 2023-01-23 PROCEDURE — 3017F COLORECTAL CA SCREEN DOC REV: CPT | Performed by: INTERNAL MEDICINE

## 2023-01-23 PROCEDURE — G8419 CALC BMI OUT NRM PARAM NOF/U: HCPCS | Performed by: INTERNAL MEDICINE

## 2023-01-23 PROCEDURE — 1036F TOBACCO NON-USER: CPT | Performed by: INTERNAL MEDICINE

## 2023-01-23 PROCEDURE — G8427 DOCREV CUR MEDS BY ELIG CLIN: HCPCS | Performed by: INTERNAL MEDICINE

## 2023-01-23 PROCEDURE — 3023F SPIROM DOC REV: CPT | Performed by: INTERNAL MEDICINE

## 2023-01-23 NOTE — PROGRESS NOTES
[facility]  Follow Up Note  MD Alexander Rodriguez   YOB: 1955    Date of Visit:  1/23/2023    Assessment/Plan:  1. COPD, mild ([address]  Stable  PFT 10/17:  INTERPRETATION:  Spirometry attempts were acceptable and reproducible. FVC was normal at 3.28 L, 88% predicted and decreased FEV1 of 1.99 L,  71% predicted. FEV1/FVC ratio was decreased at 61%. No response to  bronchodilators demonstrated on spirometry. Lung volumes showed  normal total lung capacity of 108% predicted. Diffusion capacity  showed decreased DLCO of 47% predicted. IMPRESSION:  Mild obstructive defect on spirometry with no response to  bronchodilators. Normal lung volumes and moderate decrease in  diffusion capacity. Medication management:  Spiriva  Albuterol    2. Centrilobular emphysema (HCC)  Stable  Noted on CT imaging    3. Bronchiectasis without complication (HCC)  Stable  Also noted on CT imaging    4. Pulmonary nodule  Stable/Improved  CT Chest 1/23:    FINDINGS:   Mediastinum: There is improved with a residual trace pericardial effusion. Coronary artery calcifications are a marker of atherosclerosis. There are no   enlarged thoracic lymph nodes. Calcified granulomatous disease is noted. Lungs/pleura: The tracheobronchial tree is patent. No change in the mild   bilateral lower lobe bronchiectasis. There is no pneumothorax or pleural   effusion. Moderate emphysema involves the bilateral lungs with bibasilar   scarring. No change in the 4 mm solid right upper lobe pulmonary nodule. New amorphous   calcifications have developed within the solid right upper lobe pulmonary   nodule favoring a granuloma. No new pulmonary nodules have developed. Upper Abdomen: Images of the upper abdomen are unremarkable. Soft Tissues/Bones: Degenerative changes involve the thoracic spine. Impression   1.  New amorphous calcifications within the right upper lobe pulmonary nodule   favoring a benign granuloma. 2. Unchanged 4 mm solid right upper lobe pulmonary nodule. 3. Improved with residual trace pericardial effusion. RECOMMENDATIONS:   Per ACR Lung-RADS 2022       Category 2, Benign appearance or behavior. Management:  Continue annual lung   screening with LDCT in 12 months. Repeat CT chest around 1/24    5. Pulmonary aspergillosis  No longer taking antifungal medication    6. Immunization  COVID UTD      Chief Complaint   Patient presents with    Shortness of Breath     Had his CT done 1-5-23       HPI  The patient presents with a chief complaint of moderate shortness of breath related to mild COPD of many years duration. He has mild associated cough. Exertion is a modifying factor. Has a history of pulmonary aspergillosis and has been treated for this. We have been following a pulmonary nodule on CT imaging. Rosette Peck He reports no flareups. Review of Systems  As reviewed in HPI    History  I have reviewed past medical, surgical, social and family history. This is documented elsewhere in the medical record. Physical Exam:  Well developed, well nourished  Alert and oriented  Sclera is clear  No cervical adenopathy  No JVD. Chest examination is clear. Cardiac examination reveals regular rate and rhythm without murmur, gallop or rub. The abdomen is soft, nontender and nondistended. There is no clubbing, cyanosis or edema of the extremities. There is no obvious skin rash. No focal neuro deficicts  Normal mood and affect    Allergies   Allergen Reactions    Pcn [Penicillins] Hives     Unknown reaction     Prior to Visit Medications    Medication Sig Taking?  Authorizing Provider   tiotropium (SPIRIVA RESPIMAT) 2.5 MCG/ACT AERS inhaler INHALE 2 PUFFS INTO THE LUNGS DAILY  Archie Skinner MD   paliperidone (INVEGA) 6 MG extended release tablet Take 1 tablet by mouth every morning  Archie Skinner MD   benztropine (COGENTIN) 0.5 MG tablet Take 1 tablet by mouth 2 times daily  Virgilio Joseph MD   SPIRIVA RESPIMAT 2.5 MCG/ACT AERS inhaler INHALE 2 PUFFS INTO THE LUNGS DAILY  Deja Dickinson MD   albuterol sulfate HFA (PROVENTIL;VENTOLIN;PROAIR) 108 (90 Base) MCG/ACT inhaler Inhale 2 puffs into the lungs every 6 hours as needed for Wheezing  Deja Dickinson MD   triamcinolone (KENALOG) 0.1 % ointment Apply generous amount to body 3 times daily  Virgilio Joseph MD   ketoconazole (NIZORAL) 2 % shampoo Apply topically daily as needed(use as wash daily  Virgilio Joseph MD   Multiple Vitamin (MULTIVITAMIN PO) Take 1 capsule by mouth daily  Historical Provider, MD   albuterol sulfate  (90 Base) MCG/ACT inhaler Inhale 2 puffs into the lungs every 6 hours as needed for Wheezing  Deja Dickinson MD       Vitals:    01/23/23 0850   Pulse: 86   SpO2: 95%     There is no height or weight on file to calculate BMI.      Wt Readings from Last 3 Encounters:   01/05/23 96 lb 6.4 oz (43.7 kg)   04/11/22 94 lb 9.6 oz (42.9 kg)   01/25/22 94 lb 12.8 oz (43 kg)     BP Readings from Last 3 Encounters:   01/05/23 110/70   01/25/22 122/68   12/21/21 100/70        Social History     Tobacco Use   Smoking Status Former    Packs/day: 2.00    Years: 35.00    Pack years: 70.00    Types: Cigarettes    Quit date: 1/1/2013    Years since quitting: 10.0   Smokeless Tobacco Never

## 2023-01-23 NOTE — ACP (ADVANCE CARE PLANNING)
Advance Care Planning     Advance Care Planning Clinical Specialist  Conversation Note      Date of ACP Conversation: 1/23/2023    Conversation Conducted with: Patient with Decision Making Capacity    ACP Clinical Specialist: EDUARDO Lazo  Healthcare Decision Maker:     Current Designated Healthcare Decision Maker:     Primary Decision Maker: Miya Galvan - [phone]    Secondary Decision Maker: Rogelio Enrique - [phone]  Click here to complete Healthcare Decision Makers including section of the Healthcare Decision Maker Relationship (ie \"Primary\")  Today we completed the ACP conversation, nominated his HCDM's and completed his AD docs. Care Preferences    Hospitalization: \"If your health worsens and it becomes clear that your chance of recovery is unlikely, what would your preference be regarding hospitalization? \"    Choice:  [] The patient wants hospitalization. [x] The patient prefers comfort-focused treatment without hospitalization. Ventilation: \"If you were in your present state of health and suddenly became very ill and were unable to breathe on your own, what would your preference be about the use of a ventilator (breathing machine) if it were available to you? \"      If the patient would desire the use of ventilator (breathing machine), answer \"yes\". If not, \"no\": no    \"If your health worsens and it becomes clear that your chance of recovery is unlikely, what would your preference be about the use of a ventilator (breathing machine) if it were available to you? \"     Would the patient desire the use of ventilator (breathing machine)?: No      Resuscitation  \"CPR works best to restart the heart when there is a sudden event, like a heart attack, in someone who is otherwise healthy. Unfortunately, CPR does not typically restart the heart for people who have serious health conditions or who are very sick. \"    \"In the event your heart stopped as a result of an underlying serious health condition, would you want attempts to be made to restart your heart (answer \"yes\" for attempt to resuscitate) or would you prefer a natural death (answer \"no\" for do not attempt to resuscitate)? \" no       [x] Yes   [] No   Educated Patient / John Dinh regarding differences between Advance Directives and portable DNR orders. Length of ACP Conversation in minutes:  60 mins    Conversation Outcomes:  [x] ACP discussion completed  [] Existing advance directive reviewed with patient; no changes to patient's previously recorded wishes  [x] New Advance Directive completed  [] Portable Do Not Rescitate prepared for Provider review and signature  [] POLST/POST/MOLST/MOST prepared for Provider review and signature      Follow-up plan:    [] Schedule follow-up conversation to continue planning  [] Referred individual to Provider for additional questions/concerns   [x] Advised patient/agent/surrogate to review completed ACP document and update if needed with changes in condition, patient preferences or care setting    [x] This note routed to one or more involved healthcare providers    ACP conversation has been completed. Pt's spouse Anum Blandon did not attend this appt, as she was working during this time. No rescheduled appt is needed, as pt states that they will complete her AD document independently together. Pt was able to make his wants/needs known during this conversation, along with nominating his HCDM's. Pt expressed that his quality of life is important to him & if interventions are made to help save his life will not guarantee that he has a good quality, than he would like to just pass naturally. Pt states that he already has had this discussion with his agents & he feels comfortable knowing that they will make the best decision for him, in that situation. Pt has nominated his wife Anum Blandon as his primary agent and his son Madina Aguilera as his secondary agent.  Pt completed these AD by hand today during this conversation & states that he is going to get them notarized at the bank. Once these are completed, he will provide the PCP office with a copy, so they can be scanned into his chart. At this time, no further assistance is needed from ACP and the referral can be closed.

## 2023-07-05 ENCOUNTER — OFFICE VISIT (OUTPATIENT)
Dept: INTERNAL MEDICINE CLINIC | Age: 68
End: 2023-07-05
Payer: MEDICARE

## 2023-07-05 VITALS
DIASTOLIC BLOOD PRESSURE: 68 MMHG | OXYGEN SATURATION: 98 % | SYSTOLIC BLOOD PRESSURE: 100 MMHG | HEART RATE: 62 BPM | HEIGHT: 64 IN | BODY MASS INDEX: 16.6 KG/M2 | WEIGHT: 97.25 LBS

## 2023-07-05 DIAGNOSIS — J43.2 CENTRILOBULAR EMPHYSEMA (HCC): Primary | ICD-10-CM

## 2023-07-05 DIAGNOSIS — J47.9 BRONCHIECTASIS WITHOUT COMPLICATION (HCC): ICD-10-CM

## 2023-07-05 DIAGNOSIS — E41 NUTRITIONAL MARASMUS (HCC): ICD-10-CM

## 2023-07-05 PROBLEM — R65.10 SIRS (SYSTEMIC INFLAMMATORY RESPONSE SYNDROME) (HCC): Status: RESOLVED | Noted: 2018-01-22 | Resolved: 2023-07-05

## 2023-07-05 PROCEDURE — G8427 DOCREV CUR MEDS BY ELIG CLIN: HCPCS | Performed by: INTERNAL MEDICINE

## 2023-07-05 PROCEDURE — 1123F ACP DISCUSS/DSCN MKR DOCD: CPT | Performed by: INTERNAL MEDICINE

## 2023-07-05 PROCEDURE — 3023F SPIROM DOC REV: CPT | Performed by: INTERNAL MEDICINE

## 2023-07-05 PROCEDURE — 99214 OFFICE O/P EST MOD 30 MIN: CPT | Performed by: INTERNAL MEDICINE

## 2023-07-05 PROCEDURE — G8419 CALC BMI OUT NRM PARAM NOF/U: HCPCS | Performed by: INTERNAL MEDICINE

## 2023-07-05 PROCEDURE — 1036F TOBACCO NON-USER: CPT | Performed by: INTERNAL MEDICINE

## 2023-07-05 PROCEDURE — 3017F COLORECTAL CA SCREEN DOC REV: CPT | Performed by: INTERNAL MEDICINE

## 2023-07-05 SDOH — ECONOMIC STABILITY: FOOD INSECURITY: WITHIN THE PAST 12 MONTHS, YOU WORRIED THAT YOUR FOOD WOULD RUN OUT BEFORE YOU GOT MONEY TO BUY MORE.: NEVER TRUE

## 2023-07-05 SDOH — ECONOMIC STABILITY: INCOME INSECURITY: HOW HARD IS IT FOR YOU TO PAY FOR THE VERY BASICS LIKE FOOD, HOUSING, MEDICAL CARE, AND HEATING?: NOT HARD AT ALL

## 2023-07-05 SDOH — ECONOMIC STABILITY: FOOD INSECURITY: WITHIN THE PAST 12 MONTHS, THE FOOD YOU BOUGHT JUST DIDN'T LAST AND YOU DIDN'T HAVE MONEY TO GET MORE.: NEVER TRUE

## 2023-07-05 SDOH — ECONOMIC STABILITY: HOUSING INSECURITY
IN THE LAST 12 MONTHS, WAS THERE A TIME WHEN YOU DID NOT HAVE A STEADY PLACE TO SLEEP OR SLEPT IN A SHELTER (INCLUDING NOW)?: NO

## 2023-07-05 ASSESSMENT — ENCOUNTER SYMPTOMS
EYES NEGATIVE: 1
STRIDOR: 0
HOARSE VOICE: 1
CHOKING: 0
SORE THROAT: 0
COUGH: 0
HEARTBURN: 0
GLOBUS SENSATION: 0
WATER BRASH: 0
WHEEZING: 1
BELCHING: 0
ALLERGIC/IMMUNOLOGIC NEGATIVE: 1
DIARRHEA: 0
CONSTIPATION: 0
NAUSEA: 0
ABDOMINAL PAIN: 1

## 2023-07-05 NOTE — PROGRESS NOTES
Margie Vazquez (:  1955) is a 69 y.o. male,Established patient, here for evaluation of the following chief complaint(s):  Weight Management (Weight check )  Assessment/Plan:  Gonzalez Hernandez was seen today for weight management. 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 He reports no belching, no chest pain, no choking, no coughing, no dysphagia, no early satiety, no globus sensation, no heartburn, no nausea, no sore throat, no stridor, no tooth decay or no water brash. This is a chronic problem. The problem occurs rarely. The problem has been resolved. Nothing aggravates the symptoms. Pertinent negatives include no anemia, fatigue, melena or muscle weakness. There are no known risk factors. He has tried nothing for the symptoms. The treatment provided significant relief. Past procedures do not include an abdominal ultrasound. Weight Management  Associated symptoms include abdominal pain. Pertinent negatives include no chest pain, coughing, fatigue, nausea or sore throat. Review of Systems   Constitutional: Negative. Negative for fatigue. HENT:  Positive for hoarse voice. Negative for sore throat. Eyes: Negative. Respiratory:  Positive for wheezing. Negative for cough and choking. Cardiovascular:  Negative for chest pain. Gastrointestinal:  Positive for abdominal pain. Negative for constipation, diarrhea, dysphagia, heartburn, melena and nausea. Endocrine: Negative. Musculoskeletal: Negative. Negative for muscle weakness. Allergic/Immunologic: Negative. Neurological: Negative. Psychiatric/Behavioral: Negative.      All

## 2023-07-24 ENCOUNTER — OFFICE VISIT (OUTPATIENT)
Dept: PULMONOLOGY | Age: 68
End: 2023-07-24
Payer: MEDICARE

## 2023-07-24 VITALS — OXYGEN SATURATION: 94 % | HEART RATE: 78 BPM

## 2023-07-24 DIAGNOSIS — J47.9 BRONCHIECTASIS WITHOUT COMPLICATION (HCC): ICD-10-CM

## 2023-07-24 DIAGNOSIS — J44.9 COPD, MILD (HCC): Primary | ICD-10-CM

## 2023-07-24 DIAGNOSIS — R91.1 PULMONARY NODULE: ICD-10-CM

## 2023-07-24 DIAGNOSIS — J43.2 CENTRILOBULAR EMPHYSEMA (HCC): ICD-10-CM

## 2023-07-24 DIAGNOSIS — B44.1 PULMONARY ASPERGILLOSIS (HCC): ICD-10-CM

## 2023-07-24 PROCEDURE — 99214 OFFICE O/P EST MOD 30 MIN: CPT | Performed by: INTERNAL MEDICINE

## 2023-07-24 PROCEDURE — 1036F TOBACCO NON-USER: CPT | Performed by: INTERNAL MEDICINE

## 2023-07-24 PROCEDURE — 3023F SPIROM DOC REV: CPT | Performed by: INTERNAL MEDICINE

## 2023-07-24 PROCEDURE — G8419 CALC BMI OUT NRM PARAM NOF/U: HCPCS | Performed by: INTERNAL MEDICINE

## 2023-07-24 PROCEDURE — 1123F ACP DISCUSS/DSCN MKR DOCD: CPT | Performed by: INTERNAL MEDICINE

## 2023-07-24 PROCEDURE — 3017F COLORECTAL CA SCREEN DOC REV: CPT | Performed by: INTERNAL MEDICINE

## 2023-07-24 PROCEDURE — G8427 DOCREV CUR MEDS BY ELIG CLIN: HCPCS | Performed by: INTERNAL MEDICINE

## 2023-07-24 NOTE — PROGRESS NOTES
[facility]  Follow Up Note  Priti Ku MD       Judie Real   YOB: 1955    Date of Visit:  7/24/2023    Assessment/Plan:  1. COPD, mild ([address])  Stable  PFT 10/17:  INTERPRETATION:  Spirometry attempts were acceptable and reproducible. FVC was normal at 3.28 L, 88% predicted and decreased FEV1 of 1.99 L,  71% predicted. FEV1/FVC ratio was decreased at 61%. No response to  bronchodilators demonstrated on spirometry. Lung volumes showed  normal total lung capacity of 108% predicted. Diffusion capacity  showed decreased DLCO of 47% predicted. IMPRESSION:  Mild obstructive defect on spirometry with no response to  bronchodilators. Normal lung volumes and moderate decrease in  diffusion capacity. Medication management:  Spiriva  Albuterol    2. Centrilobular emphysema (HCC)  Stable  Noted on CT imaging    3. Bronchiectasis without complication (HCC)  Stable  Also noted on CT imaging    4. Pulmonary nodule  Stable/Improved  CT Chest 1/23:    FINDINGS:   Mediastinum: There is improved with a residual trace pericardial effusion. Coronary artery calcifications are a marker of atherosclerosis. There are no   enlarged thoracic lymph nodes. Calcified granulomatous disease is noted. Lungs/pleura: The tracheobronchial tree is patent. No change in the mild   bilateral lower lobe bronchiectasis. There is no pneumothorax or pleural   effusion. Moderate emphysema involves the bilateral lungs with bibasilar   scarring. No change in the 4 mm solid right upper lobe pulmonary nodule. New amorphous   calcifications have developed within the solid right upper lobe pulmonary   nodule favoring a granuloma. No new pulmonary nodules have developed. Upper Abdomen: Images of the upper abdomen are unremarkable. Soft Tissues/Bones: Degenerative changes involve the thoracic spine. Impression   1.  New amorphous calcifications

## 2023-09-14 ENCOUNTER — TELEPHONE (OUTPATIENT)
Dept: INTERNAL MEDICINE CLINIC | Age: 68
End: 2023-09-14

## 2023-09-25 ENCOUNTER — TELEPHONE (OUTPATIENT)
Dept: PULMONOLOGY | Age: 68
End: 2023-09-25

## 2023-09-25 NOTE — TELEPHONE ENCOUNTER
Patient called and needs a refill on medication.      [address]  2.5 MCG/ACT AERS inhaler [[id number]]   90 day supply      [facility] [address] [phone] - F [phone]     [id number] [address]: [phone]

## 2023-11-01 ENCOUNTER — NURSE ONLY (OUTPATIENT)
Dept: PULMONOLOGY | Age: 68
End: 2023-11-01
Payer: MEDICARE

## 2023-11-01 DIAGNOSIS — Z23 NEED FOR VACCINATION: Primary | ICD-10-CM

## 2023-11-01 PROCEDURE — 90677 PCV20 VACCINE IM: CPT | Performed by: INTERNAL MEDICINE

## 2023-11-01 PROCEDURE — G0009 ADMIN PNEUMOCOCCAL VACCINE: HCPCS | Performed by: INTERNAL MEDICINE

## 2024-01-08 ENCOUNTER — OFFICE VISIT (OUTPATIENT)
Dept: INTERNAL MEDICINE CLINIC | Age: 69
End: 2024-01-08
Payer: MEDICARE

## 2024-01-08 VITALS
HEART RATE: 52 BPM | HEIGHT: 64 IN | DIASTOLIC BLOOD PRESSURE: 68 MMHG | OXYGEN SATURATION: 98 % | BODY MASS INDEX: 17.28 KG/M2 | SYSTOLIC BLOOD PRESSURE: 108 MMHG | WEIGHT: 101.2 LBS

## 2024-01-08 DIAGNOSIS — J43.2 CENTRILOBULAR EMPHYSEMA (HCC): ICD-10-CM

## 2024-01-08 DIAGNOSIS — F31.76 BIPOLAR DISORDER, IN FULL REMISSION, MOST RECENT EPISODE DEPRESSED (HCC): ICD-10-CM

## 2024-01-08 DIAGNOSIS — Z00.00 MEDICARE ANNUAL WELLNESS VISIT, SUBSEQUENT: Primary | ICD-10-CM

## 2024-01-08 DIAGNOSIS — Z87.891 PERSONAL HISTORY OF TOBACCO USE: ICD-10-CM

## 2024-01-08 DIAGNOSIS — E41 NUTRITIONAL MARASMUS (HCC): ICD-10-CM

## 2024-01-08 PROCEDURE — 1123F ACP DISCUSS/DSCN MKR DOCD: CPT | Performed by: INTERNAL MEDICINE

## 2024-01-08 PROCEDURE — G0439 PPPS, SUBSEQ VISIT: HCPCS | Performed by: INTERNAL MEDICINE

## 2024-01-08 PROCEDURE — G0296 VISIT TO DETERM LDCT ELIG: HCPCS | Performed by: INTERNAL MEDICINE

## 2024-01-08 PROCEDURE — G8484 FLU IMMUNIZE NO ADMIN: HCPCS | Performed by: INTERNAL MEDICINE

## 2024-01-08 PROCEDURE — 3017F COLORECTAL CA SCREEN DOC REV: CPT | Performed by: INTERNAL MEDICINE

## 2024-01-08 SDOH — SOCIAL STABILITY: SOCIAL NETWORK: HOW OFTEN DO YOU ATTEND CHURCH OR RELIGIOUS SERVICES?: NEVER

## 2024-01-08 SDOH — ECONOMIC STABILITY: INCOME INSECURITY: HOW HARD IS IT FOR YOU TO PAY FOR THE VERY BASICS LIKE FOOD, HOUSING, MEDICAL CARE, AND HEATING?: NOT VERY HARD

## 2024-01-08 SDOH — SOCIAL STABILITY: SOCIAL NETWORK
DO YOU BELONG TO ANY CLUBS OR ORGANIZATIONS SUCH AS CHURCH GROUPS UNIONS, FRATERNAL OR ATHLETIC GROUPS, OR SCHOOL GROUPS?: NO

## 2024-01-08 SDOH — SOCIAL STABILITY: SOCIAL INSECURITY
WITHIN THE LAST YEAR, HAVE TO BEEN RAPED OR FORCED TO HAVE ANY KIND OF SEXUAL ACTIVITY BY YOUR PARTNER OR EX-PARTNER?: NO

## 2024-01-08 SDOH — HEALTH STABILITY: MENTAL HEALTH: HOW OFTEN DO YOU HAVE A DRINK CONTAINING ALCOHOL?: NEVER

## 2024-01-08 SDOH — ECONOMIC STABILITY: TRANSPORTATION INSECURITY
IN THE PAST 12 MONTHS, HAS LACK OF TRANSPORTATION KEPT YOU FROM MEETINGS, WORK, OR FROM GETTING THINGS NEEDED FOR DAILY LIVING?: NO

## 2024-01-08 SDOH — SOCIAL STABILITY: SOCIAL NETWORK: ARE YOU MARRIED, WIDOWED, DIVORCED, SEPARATED, NEVER MARRIED, OR LIVING WITH A PARTNER?: MARRIED

## 2024-01-08 SDOH — ECONOMIC STABILITY: TRANSPORTATION INSECURITY
IN THE PAST 12 MONTHS, HAS THE LACK OF TRANSPORTATION KEPT YOU FROM MEDICAL APPOINTMENTS OR FROM GETTING MEDICATIONS?: NO

## 2024-01-08 SDOH — HEALTH STABILITY: PHYSICAL HEALTH: ON AVERAGE, HOW MANY DAYS PER WEEK DO YOU ENGAGE IN MODERATE TO STRENUOUS EXERCISE (LIKE A BRISK WALK)?: 5 DAYS

## 2024-01-08 SDOH — SOCIAL STABILITY: SOCIAL INSECURITY: WITHIN THE LAST YEAR, HAVE YOU BEEN AFRAID OF YOUR PARTNER OR EX-PARTNER?: NO

## 2024-01-08 SDOH — ECONOMIC STABILITY: INCOME INSECURITY: IN THE LAST 12 MONTHS, WAS THERE A TIME WHEN YOU WERE NOT ABLE TO PAY THE MORTGAGE OR RENT ON TIME?: NO

## 2024-01-08 SDOH — SOCIAL STABILITY: SOCIAL INSECURITY: WITHIN THE LAST YEAR, HAVE YOU BEEN HUMILIATED OR EMOTIONALLY ABUSED IN OTHER WAYS BY YOUR PARTNER OR EX-PARTNER?: NO

## 2024-01-08 SDOH — SOCIAL STABILITY: SOCIAL NETWORK: HOW OFTEN DO YOU GET TOGETHER WITH FRIENDS OR RELATIVES?: MORE THAN THREE TIMES A WEEK

## 2024-01-08 SDOH — SOCIAL STABILITY: SOCIAL INSECURITY
WITHIN THE LAST YEAR, HAVE YOU BEEN KICKED, HIT, SLAPPED, OR OTHERWISE PHYSICALLY HURT BY YOUR PARTNER OR EX-PARTNER?: NO

## 2024-01-08 SDOH — SOCIAL STABILITY: SOCIAL NETWORK
IN A TYPICAL WEEK, HOW MANY TIMES DO YOU TALK ON THE PHONE WITH FAMILY, FRIENDS, OR NEIGHBORS?: MORE THAN THREE TIMES A WEEK

## 2024-01-08 SDOH — HEALTH STABILITY: MENTAL HEALTH
STRESS IS WHEN SOMEONE FEELS TENSE, NERVOUS, ANXIOUS, OR CAN'T SLEEP AT NIGHT BECAUSE THEIR MIND IS TROUBLED. HOW STRESSED ARE YOU?: ONLY A LITTLE

## 2024-01-08 SDOH — HEALTH STABILITY: MENTAL HEALTH: HOW MANY STANDARD DRINKS CONTAINING ALCOHOL DO YOU HAVE ON A TYPICAL DAY?: PATIENT DOES NOT DRINK

## 2024-01-08 SDOH — HEALTH STABILITY: PHYSICAL HEALTH: ON AVERAGE, HOW MANY MINUTES DO YOU ENGAGE IN EXERCISE AT THIS LEVEL?: 50 MIN

## 2024-01-08 SDOH — SOCIAL STABILITY: SOCIAL NETWORK: HOW OFTEN DO YOU ATTENT MEETINGS OF THE CLUB OR ORGANIZATION YOU BELONG TO?: NEVER

## 2024-01-08 ASSESSMENT — PATIENT HEALTH QUESTIONNAIRE - PHQ9
SUM OF ALL RESPONSES TO PHQ9 QUESTIONS 1 & 2: 0
7. TROUBLE CONCENTRATING ON THINGS, SUCH AS READING THE NEWSPAPER OR WATCHING TELEVISION: 0
SUM OF ALL RESPONSES TO PHQ QUESTIONS 1-9: 0
10. IF YOU CHECKED OFF ANY PROBLEMS, HOW DIFFICULT HAVE THESE PROBLEMS MADE IT FOR YOU TO DO YOUR WORK, TAKE CARE OF THINGS AT HOME, OR GET ALONG WITH OTHER PEOPLE: 0
5. POOR APPETITE OR OVEREATING: 0
SUM OF ALL RESPONSES TO PHQ QUESTIONS 1-9: 0
8. MOVING OR SPEAKING SO SLOWLY THAT OTHER PEOPLE COULD HAVE NOTICED. OR THE OPPOSITE, BEING SO FIGETY OR RESTLESS THAT YOU HAVE BEEN MOVING AROUND A LOT MORE THAN USUAL: 0
6. FEELING BAD ABOUT YOURSELF - OR THAT YOU ARE A FAILURE OR HAVE LET YOURSELF OR YOUR FAMILY DOWN: 0
1. LITTLE INTEREST OR PLEASURE IN DOING THINGS: 0
4. FEELING TIRED OR HAVING LITTLE ENERGY: 0
9. THOUGHTS THAT YOU WOULD BE BETTER OFF DEAD, OR OF HURTING YOURSELF: 0
2. FEELING DOWN, DEPRESSED OR HOPELESS: 0
SUM OF ALL RESPONSES TO PHQ QUESTIONS 1-9: 0
SUM OF ALL RESPONSES TO PHQ QUESTIONS 1-9: 0
3. TROUBLE FALLING OR STAYING ASLEEP: 0

## 2024-01-08 ASSESSMENT — LIFESTYLE VARIABLES
HOW OFTEN DO YOU HAVE A DRINK CONTAINING ALCOHOL: NEVER
HOW MANY STANDARD DRINKS CONTAINING ALCOHOL DO YOU HAVE ON A TYPICAL DAY: PATIENT DOES NOT DRINK

## 2024-01-08 NOTE — PROGRESS NOTES
Eyes:      Extraocular Movements: Extraocular movements intact.      Conjunctiva/sclera: Conjunctivae normal.      Pupils: Pupils are equal, round, and reactive to light.   Cardiovascular:      Rate and Rhythm: Normal rate and regular rhythm.      Pulses: Normal pulses.      Heart sounds: No murmur heard.  Pulmonary:      Effort: Tachypnea and prolonged expiration present.      Breath sounds: Wheezing present. No rhonchi.   Abdominal:      General: Abdomen is flat. Bowel sounds are normal. There is no distension.      Palpations: Abdomen is soft. There is no mass.      Tenderness: There is no abdominal tenderness.   Musculoskeletal:         General: Normal range of motion.      Cervical back: Normal range of motion and neck supple.   Skin:     General: Skin is warm.      Capillary Refill: Capillary refill takes less than 2 seconds.   Neurological:      General: No focal deficit present.      Mental Status: He is alert and oriented to person, place, and time. Mental status is at baseline.      Gait: Gait normal.   Psychiatric:         Mood and Affect: Mood normal.         Behavior: Behavior normal.         Thought Content: Thought content normal.         Judgment: Judgment normal.            Allergies   Allergen Reactions    Pcn [Penicillins] Hives     Unknown reaction     Prior to Visit Medications    Medication Sig Taking? Authorizing Provider   tiotropium (SPIRIVA RESPIMAT) 2.5 MCG/ACT AERS inhaler Inhale 2 puffs into the lungs daily Yes Seamus Barraza MD   tiotropium (SPIRIVA RESPIMAT) 2.5 MCG/ACT AERS inhaler INHALE 2 PUFFS INTO THE LUNGS DAILY Yes Edward West MD   paliperidone (INVEGA) 6 MG extended release tablet Take 1 tablet by mouth every morning Yes Edward West MD   benztropine (COGENTIN) 0.5 MG tablet Take 1 tablet by mouth 2 times daily  Patient taking differently: Take 1 tablet by mouth daily Yes Edward West MD   ketoconazole (NIZORAL) 2 % shampoo Apply

## 2024-01-08 NOTE — PATIENT INSTRUCTIONS
the toothbrush. Their hands and fingers may be stiff, painful, or weak. If this is the case, you can:  Offer an electric toothbrush.  Enlarge the handle of a non-electric toothbrush by wrapping a sponge, an elastic bandage, or adhesive tape around it.  Push the toothbrush handle through a ball made of rubber or soft foam.  Make the handle longer and thicker by taping Popsicle sticks or tongue depressors to it.  You may also be able to buy special toothbrushes, toothpaste dispensers, and floss holders.  Your doctor may recommend a soft-bristle toothbrush if the person you care for bleeds easily. Bleeding can happen because of a health problem or from certain medicines.  A toothpaste for sensitive teeth may help if the person you care for has sensitive teeth.  How do you brush and floss someone's teeth?  If the person you are caring for has a hard time cleaning their teeth on their own, you may need to brush and floss their teeth for them. It may be easiest to have the person sit and face away from you, and to sit or stand behind them. That way you can steady their head against your arm as you reach around to floss and brush their teeth. Choose a place that has good lighting and is comfortable for both of you.  Before you begin, gather your supplies. You will need gloves, floss, a toothbrush, and a container to hold water if you are not near a sink. Wash and dry your hands well and put on gloves. Start by flossing:  Gently work a piece of floss between each of the teeth toward the gums. A plastic flossing tool may make this easier, and they are available at [facility].  Curve the floss around each tooth into a U-shape and gently slide it under the gum line.  Move the floss firmly up and down several times to scrape off the plaque.  After you've finished flossing, throw away the used floss and begin brushing:  Wet the brush and apply toothpaste.  Place the brush at a 45-degree angle where the teeth meet the gums.

## 2024-01-09 ENCOUNTER — TELEPHONE (OUTPATIENT)
Dept: PULMONOLOGY | Age: 69
End: 2024-01-09

## 2024-01-09 DIAGNOSIS — J44.9 COPD, MILD (HCC): Primary | ICD-10-CM

## 2024-01-09 RX ORDER — ALBUTEROL SULFATE 90 UG/1
2 AEROSOL, METERED RESPIRATORY (INHALATION) EVERY 6 HOURS PRN
Qty: 18 G | Refills: 11 | Status: SHIPPED | OUTPATIENT
Start: 2024-01-09 | End: 2025-01-08

## 2024-01-09 NOTE — TELEPHONE ENCOUNTER
Last appointment:  7/24/2023    Next appointment:  Visit date not found    Last refill:     albuterol sulfate  (90 Base) MCG/ACT inhaler [[id number]]  ENDED    Order Details  Dose: 2 puff Route: Inhalation Frequency: EVERY 6 HOURS PRN for Wheezing   Dispense Quantity: 1 Inhaler Refills: 11          Sig: Inhale 2 puffs into the lungs every 6 hours as needed for Wheezing         Start Date: 07/06/21

## 2024-01-09 NOTE — TELEPHONE ENCOUNTER
Patient left VM and needs a refill on medication, medication patient currently has is .    albuterol sulfate  (90 Base) MCG/ACT inhaler [[id number]]      [facility] [employer] #[id number] - [place] [address] - P [phone] - F [phone]    PH: [phone]

## 2024-01-16 ENCOUNTER — OFFICE VISIT (OUTPATIENT)
Dept: INTERNAL MEDICINE CLINIC | Age: 69
End: 2024-01-16
Payer: MEDICARE

## 2024-01-16 VITALS
DIASTOLIC BLOOD PRESSURE: 74 MMHG | HEART RATE: 68 BPM | WEIGHT: 98 LBS | SYSTOLIC BLOOD PRESSURE: 114 MMHG | BODY MASS INDEX: 16.82 KG/M2 | OXYGEN SATURATION: 98 %

## 2024-01-16 DIAGNOSIS — H61.23 BILATERAL IMPACTED CERUMEN: Primary | ICD-10-CM

## 2024-01-16 PROCEDURE — G8484 FLU IMMUNIZE NO ADMIN: HCPCS | Performed by: INTERNAL MEDICINE

## 2024-01-16 PROCEDURE — 1036F TOBACCO NON-USER: CPT | Performed by: INTERNAL MEDICINE

## 2024-01-16 PROCEDURE — 99213 OFFICE O/P EST LOW 20 MIN: CPT | Performed by: INTERNAL MEDICINE

## 2024-01-16 PROCEDURE — 3017F COLORECTAL CA SCREEN DOC REV: CPT | Performed by: INTERNAL MEDICINE

## 2024-01-16 PROCEDURE — G8419 CALC BMI OUT NRM PARAM NOF/U: HCPCS | Performed by: INTERNAL MEDICINE

## 2024-01-16 PROCEDURE — 69210 REMOVE IMPACTED EAR WAX UNI: CPT | Performed by: INTERNAL MEDICINE

## 2024-01-16 PROCEDURE — 1123F ACP DISCUSS/DSCN MKR DOCD: CPT | Performed by: INTERNAL MEDICINE

## 2024-01-16 PROCEDURE — G8427 DOCREV CUR MEDS BY ELIG CLIN: HCPCS | Performed by: INTERNAL MEDICINE

## 2024-01-22 ENCOUNTER — HOSPITAL ENCOUNTER (OUTPATIENT)
Dept: CT IMAGING | Age: 69
Discharge: HOME | End: 2024-01-22
Attending: INTERNAL MEDICINE
Payer: COMMERCIAL

## 2024-01-22 DIAGNOSIS — R91.1 PULMONARY NODULE: ICD-10-CM

## 2024-01-22 PROCEDURE — 71250 CT THORAX DX C-: CPT

## 2024-01-30 ENCOUNTER — OFFICE VISIT (OUTPATIENT)
Dept: PULMONOLOGY | Age: 69
End: 2024-01-30
Payer: COMMERCIAL

## 2024-01-30 VITALS
WEIGHT: 98 LBS | OXYGEN SATURATION: 96 % | DIASTOLIC BLOOD PRESSURE: 74 MMHG | BODY MASS INDEX: 16.82 KG/M2 | SYSTOLIC BLOOD PRESSURE: 124 MMHG | HEART RATE: 85 BPM

## 2024-01-30 DIAGNOSIS — J47.9 BRONCHIECTASIS WITHOUT COMPLICATION (HCC): ICD-10-CM

## 2024-01-30 DIAGNOSIS — J43.2 CENTRILOBULAR EMPHYSEMA (HCC): ICD-10-CM

## 2024-01-30 DIAGNOSIS — B44.1 PULMONARY ASPERGILLOSIS (HCC): ICD-10-CM

## 2024-01-30 DIAGNOSIS — R91.1 PULMONARY NODULE: ICD-10-CM

## 2024-01-30 DIAGNOSIS — J44.9 COPD, MILD (HCC): Primary | ICD-10-CM

## 2024-01-30 PROCEDURE — G8484 FLU IMMUNIZE NO ADMIN: HCPCS | Performed by: INTERNAL MEDICINE

## 2024-01-30 PROCEDURE — 1036F TOBACCO NON-USER: CPT | Performed by: INTERNAL MEDICINE

## 2024-01-30 PROCEDURE — 99214 OFFICE O/P EST MOD 30 MIN: CPT | Performed by: INTERNAL MEDICINE

## 2024-01-30 PROCEDURE — G8419 CALC BMI OUT NRM PARAM NOF/U: HCPCS | Performed by: INTERNAL MEDICINE

## 2024-01-30 PROCEDURE — G8427 DOCREV CUR MEDS BY ELIG CLIN: HCPCS | Performed by: INTERNAL MEDICINE

## 2024-01-30 PROCEDURE — 1123F ACP DISCUSS/DSCN MKR DOCD: CPT | Performed by: INTERNAL MEDICINE

## 2024-01-30 PROCEDURE — 3017F COLORECTAL CA SCREEN DOC REV: CPT | Performed by: INTERNAL MEDICINE

## 2024-01-30 PROCEDURE — 3023F SPIROM DOC REV: CPT | Performed by: INTERNAL MEDICINE

## 2024-01-30 RX ORDER — UMECLIDINIUM 62.5 UG/1
1 AEROSOL, POWDER ORAL DAILY
Qty: 1 EACH | Refills: 11 | Status: SHIPPED | OUTPATIENT
Start: 2024-01-30

## 2024-01-30 RX ORDER — ACLIDINIUM BROMIDE 400 UG/1
1 POWDER, METERED RESPIRATORY (INHALATION) 2 TIMES DAILY
Qty: 1 EACH | Refills: 11 | Status: SHIPPED | OUTPATIENT
Start: 2024-01-30

## 2024-01-30 NOTE — PROGRESS NOTES
[facility] [place]  Follow Up Note  Seamus Barraza MD       Gume Enrique   YOB: 1955    Date of Visit:  1/30/2024    Assessment/Plan:  1. COPD, mild (HCC)  Stable  PFT 10/17:  INTERPRETATION:  Spirometry attempts were acceptable and reproducible.  FVC was normal at 3.28 L, 88% predicted and decreased FEV1 of 1.99 L,  71% predicted.  FEV1/FVC ratio was decreased at 61%.  No response to  bronchodilators demonstrated on spirometry.  Lung volumes showed  normal total lung capacity of 108% predicted.  Diffusion capacity  showed decreased DLCO of 47% predicted.     IMPRESSION:  Mild obstructive defect on spirometry with no response to  bronchodilators.  Normal lung volumes and moderate decrease in  diffusion capacity.    Medication management:  Spiriva ==> very expensive for him ==> I gave him printed prescriptions for Tudorza and Incruse to investigate cost.  Albuterol    2. Centrilobular emphysema (HCC)  Stable  Noted on CT imaging    3. Bronchiectasis without complication (HCC)  Stable  Also noted on CT imaging    4. Pulmonary nodule  Stable/Improved  CT Chest 1/24:  FINDINGS:  Mediastinum: No new mediastinal, hilar, or axillary lymphadenopathy is  identified.  Few normal size/subcentimeter mediastinal lymph nodes are  identified.  Calcified mediastinal and right hilar lymph nodes consistent  with changes of old granulomatous disease.  Minimal atherosclerotic  calcification of the thoracic aorta with no evidence of aneurysm.  Again  noted is common origin of brachiocephalic and left common carotid arteries  from the aortic arch.  Coronary artery calcification is identified.  Trace  fluid identified within superior pericardial recesses.  Minimal pericardial  fluid is again identified.     Lungs/pleura: Again noted are severe emphysematous changes of the lungs.  There is stable appearance of dominant nodule within the posterolateral right  upper lobe (image 58)

## 2024-01-31 ENCOUNTER — OFFICE VISIT (OUTPATIENT)
Dept: INTERNAL MEDICINE CLINIC | Age: 69
End: 2024-01-31
Payer: COMMERCIAL

## 2024-01-31 VITALS
DIASTOLIC BLOOD PRESSURE: 68 MMHG | BODY MASS INDEX: 17.03 KG/M2 | WEIGHT: 99.2 LBS | SYSTOLIC BLOOD PRESSURE: 100 MMHG | OXYGEN SATURATION: 96 % | HEART RATE: 82 BPM

## 2024-01-31 DIAGNOSIS — E04.1 THYROID NODULE GREATER THAN OR EQUAL TO 1 CM IN DIAMETER INCIDENTALLY NOTED ON IMAGING STUDY: ICD-10-CM

## 2024-01-31 DIAGNOSIS — E04.1 THYROID NODULE GREATER THAN OR EQUAL TO 1 CM IN DIAMETER INCIDENTALLY NOTED ON IMAGING STUDY: Primary | ICD-10-CM

## 2024-01-31 PROCEDURE — G8427 DOCREV CUR MEDS BY ELIG CLIN: HCPCS | Performed by: INTERNAL MEDICINE

## 2024-01-31 PROCEDURE — 99213 OFFICE O/P EST LOW 20 MIN: CPT | Performed by: INTERNAL MEDICINE

## 2024-01-31 PROCEDURE — 1036F TOBACCO NON-USER: CPT | Performed by: INTERNAL MEDICINE

## 2024-01-31 PROCEDURE — G8484 FLU IMMUNIZE NO ADMIN: HCPCS | Performed by: INTERNAL MEDICINE

## 2024-01-31 PROCEDURE — G8419 CALC BMI OUT NRM PARAM NOF/U: HCPCS | Performed by: INTERNAL MEDICINE

## 2024-01-31 PROCEDURE — 3017F COLORECTAL CA SCREEN DOC REV: CPT | Performed by: INTERNAL MEDICINE

## 2024-01-31 PROCEDURE — 1123F ACP DISCUSS/DSCN MKR DOCD: CPT | Performed by: INTERNAL MEDICINE

## 2024-01-31 ASSESSMENT — ENCOUNTER SYMPTOMS
HOARSE VOICE: 1
CONSTIPATION: 0

## 2024-01-31 NOTE — PROGRESS NOTES
Gume Enrique (:  1955) is a 68 y.o. male,Established patient, here for evaluation of the following chief complaint(s):  Thyroid Problem         ASSESSMENT/PLAN:  1. Thyroid nodule greater than or equal to 1 cm in diameter incidentally noted on imaging study  -     US HEAD NECK SOFT TISSUE THYROID; Future  -     TSH with Reflex to FT4 ([place] Only); Future               Subjective   SUBJECTIVE/OBJECTIVE:  Thyroid Problem  Presents for initial visit. Symptoms include anxiety (chronic) and hoarse voice. Patient reports no cold intolerance, constipation, depressed mood, leg swelling, menstrual problem or nail problem. Past treatments include nothing. The treatment provided no relief. The following procedures have not been performed: radioiodine uptake scan, thyroid FNA, thyroid ultrasound and thyroidectomy. There is no history of atrial fibrillation, dementia, diabetes, Graves' ophthalmopathy, heart failure, hyperlipidemia, neuropathy, obesity or osteopenia.   \    67 yo male with new thyroid nodules noted on CT chest.    Review of Systems   HENT:  Positive for hoarse voice.    Gastrointestinal:  Negative for constipation.   Endocrine: Negative for cold intolerance.   Genitourinary:  Negative for menstrual problem.   Psychiatric/Behavioral:  The patient is nervous/anxious (chronic).    All other systems reviewed and are negative.       @DOS@    Allergies   Allergen Reactions    Penicillin G     Penicillins Hives     Unknown reaction       Current Outpatient Medications   Medication Sig Dispense Refill    umeclidinium bromide (INCRUSE ELLIPTA) 62.5 MCG/ACT inhaler Inhale 1 puff into the lungs daily 1 each 11    aclidinium (TUDORZA PRESSAIR) 400 MCG/ACT AEPB inhaler Inhale 1 puff into the lungs 2 times daily 1 each 11    albuterol sulfate HFA (PROVENTIL;VENTOLIN;PROAIR) 108 (90 Base) MCG/ACT inhaler Inhale 2 puffs into the lungs every 6 hours as needed for Wheezing 18 g 11    tiotropium (SPIRIVA RESPIMAT) 
4

## 2024-02-01 LAB — TSH SERPL DL<=0.005 MIU/L-ACNC: 2.77 UIU/ML (ref 0.27–4.2)

## 2024-02-01 NOTE — PROGRESS NOTES
Subjective:      Gume Enrique is a 68 y.o. male who presents for evaluation of a plugged ear. He noticed the symptoms in both ears, several weeks ago.   Gume denies ear pain.    Patient's medications, allergies, past medical, surgical, social and family histories were reviewed and updated as appropriate.    Review of Systems  Pertinent items are noted in HPI.      Objective:      /74 (Site: Right Upper Arm, Position: Sitting, Cuff Size: Medium Adult)   Pulse 68   Wt 44.5 kg (98 lb)   SpO2 98%   BMI 16.82 kg/m²   General: alert, appears stated age, and cooperative   Right Ear: right canal ceruminous: cleaned with curette and ceruminous: irrigated, left canal ceruminous: cleaned with curette and ceruminous: irrigated, and both canals ceruminous: cleaned with curette   Left Ear:  both TM's normal landmarks and mobility   After removal: normal bilaterally      Ceruminosis is noted.  Wax is removed by syringing and manual debridement. Instructions for home care to prevent wax buildup are given.   Assessment:      Cerumen Impaction, without otitis externa.      Plan:      Cerumen removed by flushing, flushing after use of wax softener, and currettage.  Care instructions given.  Home treatment: none.  Follow up as needed.

## 2024-02-07 ENCOUNTER — HOSPITAL ENCOUNTER (OUTPATIENT)
Dept: ULTRASOUND IMAGING | Age: 69
Discharge: HOME | End: 2024-02-07
Attending: INTERNAL MEDICINE
Payer: MEDICARE

## 2024-02-07 DIAGNOSIS — E04.1 THYROID NODULE GREATER THAN OR EQUAL TO 1 CM IN DIAMETER INCIDENTALLY NOTED ON IMAGING STUDY: ICD-10-CM

## 2024-02-07 PROCEDURE — 76536 US EXAM OF HEAD AND NECK: CPT

## 2024-02-14 ENCOUNTER — TELEPHONE (OUTPATIENT)
Dept: INTERNAL MEDICINE CLINIC | Age: 69
End: 2024-02-14

## 2024-02-14 DIAGNOSIS — E04.1 THYROID NODULE GREATER THAN OR EQUAL TO 1 CM IN DIAMETER INCIDENTALLY NOTED ON IMAGING STUDY: Primary | ICD-10-CM

## 2024-02-14 NOTE — TELEPHONE ENCOUNTER
----- Message from Jaycee Andrade sent at 2/14/2024  9:53 AM EST -----  Subject: Results Request    QUESTIONS  Results: SHALINI Ortiz, Ultrasound of Left and Rt thyroid nodule; Ordered   by:   Date Performed: 2024-02-07  ---------------------------------------------------------------------------  --------------  CALL BACK INFO    [phone]; OK to leave message on voicemail  ---------------------------------------------------------------------------  --------------

## 2024-03-12 ENCOUNTER — OFFICE VISIT (OUTPATIENT)
Dept: ENT CLINIC | Age: 69
End: 2024-03-12
Payer: COMMERCIAL

## 2024-03-12 VITALS
OXYGEN SATURATION: 97 % | DIASTOLIC BLOOD PRESSURE: 84 MMHG | HEART RATE: 66 BPM | SYSTOLIC BLOOD PRESSURE: 121 MMHG | WEIGHT: 98.8 LBS | BODY MASS INDEX: 16.87 KG/M2 | TEMPERATURE: 97.1 F | HEIGHT: 64 IN

## 2024-03-12 DIAGNOSIS — E04.2 MULTINODULAR THYROID: Primary | ICD-10-CM

## 2024-03-12 DIAGNOSIS — H61.22 IMPACTED CERUMEN OF LEFT EAR: ICD-10-CM

## 2024-03-12 PROCEDURE — 99203 OFFICE O/P NEW LOW 30 MIN: CPT | Performed by: STUDENT IN AN ORGANIZED HEALTH CARE EDUCATION/TRAINING PROGRAM

## 2024-03-12 PROCEDURE — G8427 DOCREV CUR MEDS BY ELIG CLIN: HCPCS | Performed by: STUDENT IN AN ORGANIZED HEALTH CARE EDUCATION/TRAINING PROGRAM

## 2024-03-12 PROCEDURE — 3017F COLORECTAL CA SCREEN DOC REV: CPT | Performed by: STUDENT IN AN ORGANIZED HEALTH CARE EDUCATION/TRAINING PROGRAM

## 2024-03-12 PROCEDURE — 1123F ACP DISCUSS/DSCN MKR DOCD: CPT | Performed by: STUDENT IN AN ORGANIZED HEALTH CARE EDUCATION/TRAINING PROGRAM

## 2024-03-12 PROCEDURE — G8484 FLU IMMUNIZE NO ADMIN: HCPCS | Performed by: STUDENT IN AN ORGANIZED HEALTH CARE EDUCATION/TRAINING PROGRAM

## 2024-03-12 PROCEDURE — 1036F TOBACCO NON-USER: CPT | Performed by: STUDENT IN AN ORGANIZED HEALTH CARE EDUCATION/TRAINING PROGRAM

## 2024-03-12 PROCEDURE — 69210 REMOVE IMPACTED EAR WAX UNI: CPT | Performed by: STUDENT IN AN ORGANIZED HEALTH CARE EDUCATION/TRAINING PROGRAM

## 2024-03-12 PROCEDURE — G8419 CALC BMI OUT NRM PARAM NOF/U: HCPCS | Performed by: STUDENT IN AN ORGANIZED HEALTH CARE EDUCATION/TRAINING PROGRAM

## 2024-03-12 NOTE — PROGRESS NOTES
[facility]  DIVISION OF OTOLARYNGOLOGY- HEAD & NECK SURGERY  NEW PATIENT HISTORY AND PHYSICAL NOTE      Patient Name: Gume Enrique  Medical Record Number:  [id number]  Primary Care Physician:  Edward West MD    ChiefComplaint     Chief Complaint   Patient presents with    Other     Tyroid nodules        History of Present Illness     Gume Enrique is an 68 y.o. male presenting with thyroid nodules.  Incidentally noted to have thyroid nodules on CT chest performed on 1/22/2024.  No family history of thyroid cancer.  No history of radiation exposure to the head or neck.  No history of head or neck surgery.  Denies dysphagia, odynophagia, sore throat, voice changes, shortness of breath.  No cosmetic concerns of the neck.  No thyroid hormone supplementation.       Past Medical History     Past Medical History:   Diagnosis Date    Arthritis     Aspergillus (HCC) 05/18/2018    resp culture    Bipolar 1 disorder (HCC)     Chronic bronchitis, obstructive     COPD (chronic obstructive pulmonary disease) (HCC)     GERD (gastroesophageal reflux disease)     SIRS (systemic inflammatory response syndrome) (HCC) 1/22/2018    Tourette's        Past Surgical History     Past Surgical History:   Procedure Laterality Date    BRONCHOSCOPY N/A 05/18/2018    BRONCHOSCOPY N/A 11/6/2019    BRONCHOSCOPY WITH LAVAGE performed by Seamus Barraza MD at [facility] ENDOSCOPY    ENDOSCOPY, COLON, DIAGNOSTIC      KNEE ARTHROSCOPY      bilateral    LUNG BIOPSY  12/2018    TONSILLECTOMY      UPPER GASTROINTESTINAL ENDOSCOPY  9/25/15    UPPER GASTROINTESTINAL ENDOSCOPY N/A 2/21/2019    EGD DILATION BALLOON performed by Kaden Ortiz MD at [facility] ENDOSCOPY    UPPER GASTROINTESTINAL ENDOSCOPY N/A 2/21/2019    EGD BIOPSY performed by Kaden Ortiz MD at [facility] ENDOSCOPY    VASECTOMY         Family History     Family History   Problem Relation Age of Onset    Arthritis Mother     Glaucoma Mother     High Blood Pressure

## 2024-04-01 ENCOUNTER — APPOINTMENT (OUTPATIENT)
Dept: GENERAL RADIOLOGY | Age: 69
DRG: 871 | End: 2024-04-01
Payer: COMMERCIAL

## 2024-04-01 ENCOUNTER — HOSPITAL ENCOUNTER (INPATIENT)
Age: 69
LOS: 2 days | Discharge: HOME | DRG: 871 | End: 2024-04-04
Attending: EMERGENCY MEDICINE | Admitting: INTERNAL MEDICINE
Payer: COMMERCIAL

## 2024-04-01 DIAGNOSIS — J44.1 COPD EXACERBATION (HCC): ICD-10-CM

## 2024-04-01 DIAGNOSIS — Z71.89 GOALS OF CARE, COUNSELING/DISCUSSION: ICD-10-CM

## 2024-04-01 DIAGNOSIS — R82.4 URINE KETONES: ICD-10-CM

## 2024-04-01 DIAGNOSIS — D72.829 LEUKOCYTOSIS, UNSPECIFIED TYPE: ICD-10-CM

## 2024-04-01 DIAGNOSIS — J11.1 INFLUENZA WITH RESPIRATORY MANIFESTATION OTHER THAN PNEUMONIA: ICD-10-CM

## 2024-04-01 DIAGNOSIS — J10.1 INFLUENZA A: ICD-10-CM

## 2024-04-01 DIAGNOSIS — J96.01 ACUTE RESPIRATORY FAILURE WITH HYPOXIA (HCC): Primary | ICD-10-CM

## 2024-04-01 LAB
ALBUMIN SERPL-MCNC: 4.6 G/DL (ref 3.4–5)
ALBUMIN/GLOB SERPL: 1.7 {RATIO} (ref 1.1–2.2)
ALP SERPL-CCNC: 61 U/L (ref 40–129)
ALT SERPL-CCNC: 17 U/L (ref 10–40)
ANION GAP SERPL CALCULATED.3IONS-SCNC: 12 MMOL/L (ref 3–16)
AST SERPL-CCNC: 24 U/L (ref 15–37)
BACTERIA URNS QL MICRO: NORMAL /HPF
BASOPHILS # BLD: 0.1 K/UL (ref 0–0.2)
BASOPHILS NFR BLD: 0.4 %
BILIRUB SERPL-MCNC: 0.7 MG/DL (ref 0–1)
BILIRUB UR QL STRIP.AUTO: NEGATIVE
BUN SERPL-MCNC: 12 MG/DL (ref 7–20)
CALCIUM SERPL-MCNC: 9.3 MG/DL (ref 8.3–10.6)
CHLORIDE SERPL-SCNC: 105 MMOL/L (ref 99–110)
CLARITY UR: CLEAR
CO2 SERPL-SCNC: 21 MMOL/L (ref 21–32)
COLOR UR: YELLOW
CREAT SERPL-MCNC: 0.8 MG/DL (ref 0.8–1.3)
DEPRECATED RDW RBC AUTO: 13 % (ref 12.4–15.4)
EOSINOPHIL # BLD: 0 K/UL (ref 0–0.6)
EOSINOPHIL NFR BLD: 0 %
EPI CELLS #/AREA URNS AUTO: 1 /HPF (ref 0–5)
FLUAV RNA RESP QL NAA+PROBE: DETECTED
FLUBV RNA RESP QL NAA+PROBE: NOT DETECTED
GFR SERPLBLD CREATININE-BSD FMLA CKD-EPI: >90 ML/MIN/{1.73_M2}
GLUCOSE SERPL-MCNC: 139 MG/DL (ref 70–99)
GLUCOSE UR STRIP.AUTO-MCNC: NEGATIVE MG/DL
HCT VFR BLD AUTO: 47.4 % (ref 40.5–52.5)
HGB BLD-MCNC: 16.7 G/DL (ref 13.5–17.5)
HGB UR QL STRIP.AUTO: NEGATIVE
HYALINE CASTS #/AREA URNS AUTO: 1 /LPF (ref 0–8)
KETONES UR STRIP.AUTO-MCNC: 40 MG/DL
LACTATE BLDV-SCNC: 1.5 MMOL/L (ref 0.4–1.9)
LEUKOCYTE ESTERASE UR QL STRIP.AUTO: NEGATIVE
LIPASE SERPL-CCNC: 11 U/L (ref 13–60)
LYMPHOCYTES # BLD: 0.3 K/UL (ref 1–5.1)
LYMPHOCYTES NFR BLD: 2 %
MCH RBC QN AUTO: 32.1 PG (ref 26–34)
MCHC RBC AUTO-ENTMCNC: 35.3 G/DL (ref 31–36)
MCV RBC AUTO: 90.9 FL (ref 80–100)
MONOCYTES # BLD: 0.7 K/UL (ref 0–1.3)
MONOCYTES NFR BLD: 5.4 %
NEUTROPHILS # BLD: 12.9 K/UL (ref 1.7–7.7)
NEUTROPHILS NFR BLD: 92.2 %
NITRITE UR QL STRIP.AUTO: NEGATIVE
PH UR STRIP.AUTO: 6 [PH] (ref 5–8)
PLATELET # BLD AUTO: 248 K/UL (ref 135–450)
PMV BLD AUTO: 7.2 FL (ref 5–10.5)
POTASSIUM SERPL-SCNC: 4.4 MMOL/L (ref 3.5–5.1)
PROCALCITONIN SERPL IA-MCNC: 0.32 NG/ML (ref 0–0.15)
PROT SERPL-MCNC: 7.3 G/DL (ref 6.4–8.2)
PROT UR STRIP.AUTO-MCNC: ABNORMAL MG/DL
RBC # BLD AUTO: 5.22 M/UL (ref 4.2–5.9)
RBC CLUMPS #/AREA URNS AUTO: 3 /HPF (ref 0–4)
SARS-COV-2 RNA RESP QL NAA+PROBE: NOT DETECTED
SODIUM SERPL-SCNC: 138 MMOL/L (ref 136–145)
SP GR UR STRIP.AUTO: 1.02 (ref 1–1.03)
UA COMPLETE W REFLEX CULTURE PNL UR: ABNORMAL
UA DIPSTICK W REFLEX MICRO PNL UR: YES
URN SPEC COLLECT METH UR: ABNORMAL
UROBILINOGEN UR STRIP-ACNC: 1 E.U./DL
WBC # BLD AUTO: 13.9 K/UL (ref 4–11)
WBC #/AREA URNS AUTO: 1 /HPF (ref 0–5)

## 2024-04-01 PROCEDURE — 83605 ASSAY OF LACTIC ACID: CPT

## 2024-04-01 PROCEDURE — 96367 TX/PROPH/DG ADDL SEQ IV INF: CPT

## 2024-04-01 PROCEDURE — 85025 COMPLETE CBC W/AUTO DIFF WBC: CPT

## 2024-04-01 PROCEDURE — 81001 URINALYSIS AUTO W/SCOPE: CPT

## 2024-04-01 PROCEDURE — 6360000002 HC RX W HCPCS: Performed by: EMERGENCY MEDICINE

## 2024-04-01 PROCEDURE — 71046 X-RAY EXAM CHEST 2 VIEWS: CPT

## 2024-04-01 PROCEDURE — 87636 SARSCOV2 & INF A&B AMP PRB: CPT

## 2024-04-01 PROCEDURE — 2580000003 HC RX 258: Performed by: PHYSICIAN ASSISTANT

## 2024-04-01 PROCEDURE — 80053 COMPREHEN METABOLIC PANEL: CPT

## 2024-04-01 PROCEDURE — 94640 AIRWAY INHALATION TREATMENT: CPT

## 2024-04-01 PROCEDURE — 93005 ELECTROCARDIOGRAM TRACING: CPT | Performed by: PHYSICIAN ASSISTANT

## 2024-04-01 PROCEDURE — 84145 PROCALCITONIN (PCT): CPT

## 2024-04-01 PROCEDURE — 87040 BLOOD CULTURE FOR BACTERIA: CPT

## 2024-04-01 PROCEDURE — 6370000000 HC RX 637 (ALT 250 FOR IP): Performed by: EMERGENCY MEDICINE

## 2024-04-01 PROCEDURE — 96365 THER/PROPH/DIAG IV INF INIT: CPT

## 2024-04-01 PROCEDURE — 99285 EMERGENCY DEPT VISIT HI MDM: CPT

## 2024-04-01 PROCEDURE — 6370000000 HC RX 637 (ALT 250 FOR IP): Performed by: PHYSICIAN ASSISTANT

## 2024-04-01 PROCEDURE — 83690 ASSAY OF LIPASE: CPT

## 2024-04-01 PROCEDURE — 81003 URINALYSIS AUTO W/O SCOPE: CPT

## 2024-04-01 PROCEDURE — 2580000003 HC RX 258: Performed by: EMERGENCY MEDICINE

## 2024-04-01 RX ORDER — 0.9 % SODIUM CHLORIDE 0.9 %
1000 INTRAVENOUS SOLUTION INTRAVENOUS ONCE
Status: COMPLETED | OUTPATIENT
Start: 2024-04-01 | End: 2024-04-01

## 2024-04-01 RX ORDER — ALBUTEROL SULFATE 2.5 MG/3ML
5 SOLUTION RESPIRATORY (INHALATION) ONCE
Status: COMPLETED | OUTPATIENT
Start: 2024-04-01 | End: 2024-04-01

## 2024-04-01 RX ORDER — ALBUTEROL SULFATE 2.5 MG/3ML
2.5 SOLUTION RESPIRATORY (INHALATION) EVERY 4 HOURS PRN
Status: DISCONTINUED | OUTPATIENT
Start: 2024-04-01 | End: 2024-04-04 | Stop reason: HOSPADM

## 2024-04-01 RX ORDER — OSELTAMIVIR PHOSPHATE 75 MG/1
75 CAPSULE ORAL ONCE
Status: COMPLETED | OUTPATIENT
Start: 2024-04-01 | End: 2024-04-01

## 2024-04-01 RX ORDER — ACETAMINOPHEN 325 MG/1
650 TABLET ORAL ONCE
Status: COMPLETED | OUTPATIENT
Start: 2024-04-01 | End: 2024-04-01

## 2024-04-01 RX ORDER — BENZONATATE 100 MG/1
200 CAPSULE ORAL ONCE
Status: COMPLETED | OUTPATIENT
Start: 2024-04-01 | End: 2024-04-01

## 2024-04-01 RX ORDER — IBUPROFEN 400 MG/1
400 TABLET ORAL ONCE
Status: COMPLETED | OUTPATIENT
Start: 2024-04-01 | End: 2024-04-01

## 2024-04-01 RX ADMIN — SODIUM CHLORIDE 1000 ML: 9 INJECTION, SOLUTION INTRAVENOUS at 21:11

## 2024-04-01 RX ADMIN — BENZONATATE 200 MG: 100 CAPSULE ORAL at 22:33

## 2024-04-01 RX ADMIN — CEFTRIAXONE SODIUM 1000 MG: 1 INJECTION, POWDER, FOR SOLUTION INTRAMUSCULAR; INTRAVENOUS at 22:47

## 2024-04-01 RX ADMIN — IBUPROFEN 400 MG: 400 TABLET, FILM COATED ORAL at 22:28

## 2024-04-01 RX ADMIN — ALBUTEROL SULFATE 5 MG: 2.5 SOLUTION RESPIRATORY (INHALATION) at 23:35

## 2024-04-01 RX ADMIN — OSELTAMIVIR PHOSPHATE 75 MG: 75 CAPSULE ORAL at 23:29

## 2024-04-01 RX ADMIN — AZITHROMYCIN MONOHYDRATE 500 MG: 500 INJECTION, POWDER, LYOPHILIZED, FOR SOLUTION INTRAVENOUS at 23:26

## 2024-04-01 RX ADMIN — ACETAMINOPHEN 650 MG: 325 TABLET ORAL at 20:00

## 2024-04-01 ASSESSMENT — ENCOUNTER SYMPTOMS
DIARRHEA: 0
EYE REDNESS: 0
COLOR CHANGE: 0
RHINORRHEA: 0
NAUSEA: 0
SHORTNESS OF BREATH: 1
ABDOMINAL PAIN: 0
COUGH: 1
SORE THROAT: 0
VOMITING: 0

## 2024-04-01 ASSESSMENT — PAIN DESCRIPTION - LOCATION
LOCATION: CHEST
LOCATION: CHEST

## 2024-04-01 ASSESSMENT — PAIN SCALES - GENERAL
PAINLEVEL_OUTOF10: 4
PAINLEVEL_OUTOF10: 2

## 2024-04-01 NOTE — ED PROVIDER NOTES
[facility] EMERGENCY DEPARTMENT  EMERGENCY DEPARTMENT ENCOUNTER        Pt Name: Gume Enrique  MRN: [id number]  Birthdate 1955  Date of evaluation: 4/1/2024  Provider: PEDRO PABLO Davidson  PCP: Edward West MD  Note Started: 7:12 PM EDT 4/1/24       I have seen and evaluated this patient with my supervising physician Leidy Beltran MD.      CHIEF COMPLAINT       Chief Complaint   Patient presents with    Cough     Pt w c/o cough from Saturday. Pt w COPD       HISTORY OF PRESENT ILLNESS: 1 or more Elements     History From: Patient, wife  Limitations to history : None    Gume Enrique is a 68 y.o. male who presents to the emergency department with chief complaint of cough.  Wife states he has been constantly coughing for the last 2 days.  Wife has been sick with flulike symptoms, but reports her presentation has been much more mild.  Wife states patient does have history of COPD.  His symptoms have included cough, fatigue and some \"fogginess\".  He has some upper chest pain with coughing only.  No nasal congestion, fever, GI symptoms or other acute complaints.  Patient does report some mild shortness of breath, but states this really just occurs when he has \"coughing fits\".    Nursing Notes were all reviewed and agreed with or any disagreements were addressed in the HPI.    REVIEW OF SYSTEMS :      Review of Systems   Constitutional:  Positive for fever. Negative for chills and fatigue.   HENT:  Negative for congestion, rhinorrhea and sore throat.    Eyes:  Negative for redness.   Respiratory:  Positive for cough and shortness of breath (with coughing).    Cardiovascular:  Positive for chest pain (with coughing). Negative for palpitations.   Gastrointestinal:  Negative for abdominal pain, diarrhea, nausea and vomiting.   Genitourinary:  Negative for dysuria.   Musculoskeletal:  Negative for arthralgias and joint swelling.   Skin:  Negative for color change.   Neurological:

## 2024-04-02 ENCOUNTER — APPOINTMENT (OUTPATIENT)
Dept: CT IMAGING | Age: 69
DRG: 871 | End: 2024-04-02
Payer: COMMERCIAL

## 2024-04-02 PROBLEM — J96.01 ACUTE RESPIRATORY FAILURE WITH HYPOXIA (HCC): Status: ACTIVE | Noted: 2024-04-02

## 2024-04-02 LAB
ALBUMIN SERPL-MCNC: 4.2 G/DL (ref 3.4–5)
ALBUMIN/GLOB SERPL: 1.6 {RATIO} (ref 1.1–2.2)
ALP SERPL-CCNC: 55 U/L (ref 40–129)
ALT SERPL-CCNC: 17 U/L (ref 10–40)
ANION GAP SERPL CALCULATED.3IONS-SCNC: 11 MMOL/L (ref 3–16)
AST SERPL-CCNC: 33 U/L (ref 15–37)
BASOPHILS # BLD: 0.1 K/UL (ref 0–0.2)
BASOPHILS NFR BLD: 1 %
BILIRUB SERPL-MCNC: 0.8 MG/DL (ref 0–1)
BUN SERPL-MCNC: 12 MG/DL (ref 7–20)
CALCIUM SERPL-MCNC: 9.1 MG/DL (ref 8.3–10.6)
CHLORIDE SERPL-SCNC: 106 MMOL/L (ref 99–110)
CO2 SERPL-SCNC: 23 MMOL/L (ref 21–32)
CREAT SERPL-MCNC: 0.8 MG/DL (ref 0.8–1.3)
DEPRECATED RDW RBC AUTO: 13.5 % (ref 12.4–15.4)
EKG ATRIAL RATE: 117 BPM
EKG DIAGNOSIS: NORMAL
EKG P AXIS: 85 DEGREES
EKG P-R INTERVAL: 180 MS
EKG Q-T INTERVAL: 330 MS
EKG QRS DURATION: 62 MS
EKG QTC CALCULATION (BAZETT): 460 MS
EKG R AXIS: 31 DEGREES
EKG T AXIS: 75 DEGREES
EKG VENTRICULAR RATE: 117 BPM
EOSINOPHIL # BLD: 0 K/UL (ref 0–0.6)
EOSINOPHIL NFR BLD: 0 %
GFR SERPLBLD CREATININE-BSD FMLA CKD-EPI: >90 ML/MIN/{1.73_M2}
GLUCOSE SERPL-MCNC: 105 MG/DL (ref 70–99)
HCT VFR BLD AUTO: 48.2 % (ref 40.5–52.5)
HGB BLD-MCNC: 16.5 G/DL (ref 13.5–17.5)
LYMPHOCYTES # BLD: 0.9 K/UL (ref 1–5.1)
LYMPHOCYTES NFR BLD: 8 %
MCH RBC QN AUTO: 31.1 PG (ref 26–34)
MCHC RBC AUTO-ENTMCNC: 34.2 G/DL (ref 31–36)
MCV RBC AUTO: 90.9 FL (ref 80–100)
MONOCYTES # BLD: 0.2 K/UL (ref 0–1.3)
MONOCYTES NFR BLD: 2 %
NEUTROPHILS # BLD: 9.2 K/UL (ref 1.7–7.7)
NEUTROPHILS NFR BLD: 86 %
NEUTS BAND NFR BLD MANUAL: 2 % (ref 0–7)
PLATELET # BLD AUTO: 226 K/UL (ref 135–450)
PLATELET BLD QL SMEAR: ADEQUATE
PMV BLD AUTO: 7.4 FL (ref 5–10.5)
POLYCHROMASIA BLD QL SMEAR: ABNORMAL
POTASSIUM SERPL-SCNC: 4.1 MMOL/L (ref 3.5–5.1)
PROT SERPL-MCNC: 6.8 G/DL (ref 6.4–8.2)
RBC # BLD AUTO: 5.3 M/UL (ref 4.2–5.9)
SLIDE REVIEW: ABNORMAL
SODIUM SERPL-SCNC: 140 MMOL/L (ref 136–145)
VARIANT LYMPHS NFR BLD MANUAL: 1 % (ref 0–6)
WBC # BLD AUTO: 10.5 K/UL (ref 4–11)

## 2024-04-02 PROCEDURE — 2580000003 HC RX 258: Performed by: INTERNAL MEDICINE

## 2024-04-02 PROCEDURE — 94640 AIRWAY INHALATION TREATMENT: CPT

## 2024-04-02 PROCEDURE — 85025 COMPLETE CBC W/AUTO DIFF WBC: CPT

## 2024-04-02 PROCEDURE — 2500000003 HC RX 250 WO HCPCS: Performed by: EMERGENCY MEDICINE

## 2024-04-02 PROCEDURE — 6370000000 HC RX 637 (ALT 250 FOR IP): Performed by: INTERNAL MEDICINE

## 2024-04-02 PROCEDURE — 87040 BLOOD CULTURE FOR BACTERIA: CPT

## 2024-04-02 PROCEDURE — 2700000000 HC OXYGEN THERAPY PER DAY

## 2024-04-02 PROCEDURE — 1200000000 HC SEMI PRIVATE

## 2024-04-02 PROCEDURE — 93306 TTE W/DOPPLER COMPLETE: CPT

## 2024-04-02 PROCEDURE — 6360000002 HC RX W HCPCS: Performed by: INTERNAL MEDICINE

## 2024-04-02 PROCEDURE — 97530 THERAPEUTIC ACTIVITIES: CPT

## 2024-04-02 PROCEDURE — 97161 PT EVAL LOW COMPLEX 20 MIN: CPT

## 2024-04-02 PROCEDURE — 6360000004 HC RX CONTRAST MEDICATION: Performed by: PHYSICIAN ASSISTANT

## 2024-04-02 PROCEDURE — 80053 COMPREHEN METABOLIC PANEL: CPT

## 2024-04-02 PROCEDURE — 93010 ELECTROCARDIOGRAM REPORT: CPT | Performed by: INTERNAL MEDICINE

## 2024-04-02 PROCEDURE — 94761 N-INVAS EAR/PLS OXIMETRY MLT: CPT

## 2024-04-02 PROCEDURE — 97116 GAIT TRAINING THERAPY: CPT

## 2024-04-02 PROCEDURE — 71260 CT THORAX DX C+: CPT

## 2024-04-02 PROCEDURE — 97165 OT EVAL LOW COMPLEX 30 MIN: CPT

## 2024-04-02 RX ORDER — BENZTROPINE MESYLATE 1 MG/1
1 TABLET ORAL DAILY
Status: DISCONTINUED | OUTPATIENT
Start: 2024-04-02 | End: 2024-04-02

## 2024-04-02 RX ORDER — ACETAMINOPHEN 325 MG/1
650 TABLET ORAL EVERY 6 HOURS PRN
Status: DISCONTINUED | OUTPATIENT
Start: 2024-04-02 | End: 2024-04-04 | Stop reason: HOSPADM

## 2024-04-02 RX ORDER — OSELTAMIVIR PHOSPHATE 30 MG/1
30 CAPSULE ORAL 2 TIMES DAILY
Status: DISCONTINUED | OUTPATIENT
Start: 2024-04-02 | End: 2024-04-03

## 2024-04-02 RX ORDER — GUAIFENESIN 200 MG/10ML
200 LIQUID ORAL ONCE
Status: COMPLETED | OUTPATIENT
Start: 2024-04-02 | End: 2024-04-02

## 2024-04-02 RX ORDER — MAGNESIUM SULFATE IN WATER 40 MG/ML
2000 INJECTION, SOLUTION INTRAVENOUS PRN
Status: DISCONTINUED | OUTPATIENT
Start: 2024-04-02 | End: 2024-04-04 | Stop reason: HOSPADM

## 2024-04-02 RX ORDER — BENZTROPINE MESYLATE 1 MG/1
0.5 TABLET ORAL DAILY
Status: DISCONTINUED | OUTPATIENT
Start: 2024-04-02 | End: 2024-04-02

## 2024-04-02 RX ORDER — ALBUTEROL SULFATE 90 UG/1
2 AEROSOL, METERED RESPIRATORY (INHALATION) EVERY 6 HOURS PRN
Status: DISCONTINUED | OUTPATIENT
Start: 2024-04-02 | End: 2024-04-04 | Stop reason: HOSPADM

## 2024-04-02 RX ORDER — ALBUTEROL SULFATE 2.5 MG/3ML
2.5 SOLUTION RESPIRATORY (INHALATION)
Status: DISCONTINUED | OUTPATIENT
Start: 2024-04-02 | End: 2024-04-04 | Stop reason: HOSPADM

## 2024-04-02 RX ORDER — SODIUM CHLORIDE 9 MG/ML
INJECTION, SOLUTION INTRAVENOUS PRN
Status: DISCONTINUED | OUTPATIENT
Start: 2024-04-02 | End: 2024-04-04 | Stop reason: HOSPADM

## 2024-04-02 RX ORDER — ACETAMINOPHEN 650 MG/1
650 SUPPOSITORY RECTAL EVERY 6 HOURS PRN
Status: DISCONTINUED | OUTPATIENT
Start: 2024-04-02 | End: 2024-04-04 | Stop reason: HOSPADM

## 2024-04-02 RX ORDER — LANOLIN ALCOHOL/MO/W.PET/CERES
3 CREAM (GRAM) TOPICAL NIGHTLY
Status: DISCONTINUED | OUTPATIENT
Start: 2024-04-02 | End: 2024-04-04 | Stop reason: HOSPADM

## 2024-04-02 RX ORDER — SODIUM CHLORIDE 0.9 % (FLUSH) 0.9 %
10 SYRINGE (ML) INJECTION PRN
Status: DISCONTINUED | OUTPATIENT
Start: 2024-04-02 | End: 2024-04-04 | Stop reason: HOSPADM

## 2024-04-02 RX ORDER — AZITHROMYCIN 250 MG/1
250 TABLET, FILM COATED ORAL EVERY 24 HOURS
Status: DISCONTINUED | OUTPATIENT
Start: 2024-04-02 | End: 2024-04-04

## 2024-04-02 RX ORDER — PALIPERIDONE 6 MG/1
6 TABLET, EXTENDED RELEASE ORAL DAILY
Status: DISCONTINUED | OUTPATIENT
Start: 2024-04-02 | End: 2024-04-04 | Stop reason: HOSPADM

## 2024-04-02 RX ORDER — ONDANSETRON 2 MG/ML
4 INJECTION INTRAMUSCULAR; INTRAVENOUS EVERY 6 HOURS PRN
Status: DISCONTINUED | OUTPATIENT
Start: 2024-04-02 | End: 2024-04-04 | Stop reason: HOSPADM

## 2024-04-02 RX ORDER — POTASSIUM CHLORIDE 20 MEQ/1
40 TABLET, EXTENDED RELEASE ORAL PRN
Status: DISCONTINUED | OUTPATIENT
Start: 2024-04-02 | End: 2024-04-04 | Stop reason: HOSPADM

## 2024-04-02 RX ORDER — PROMETHAZINE HYDROCHLORIDE 25 MG/1
12.5 TABLET ORAL EVERY 6 HOURS PRN
Status: DISCONTINUED | OUTPATIENT
Start: 2024-04-02 | End: 2024-04-04 | Stop reason: HOSPADM

## 2024-04-02 RX ORDER — POTASSIUM CHLORIDE 7.45 MG/ML
10 INJECTION INTRAVENOUS PRN
Status: DISCONTINUED | OUTPATIENT
Start: 2024-04-02 | End: 2024-04-04 | Stop reason: HOSPADM

## 2024-04-02 RX ORDER — BENZTROPINE MESYLATE 1 MG/1
0.5 TABLET ORAL DAILY
Status: DISCONTINUED | OUTPATIENT
Start: 2024-04-02 | End: 2024-04-04 | Stop reason: HOSPADM

## 2024-04-02 RX ORDER — NICOTINE 21 MG/24HR
1 PATCH, TRANSDERMAL 24 HOURS TRANSDERMAL DAILY
Status: DISCONTINUED | OUTPATIENT
Start: 2024-04-02 | End: 2024-04-04 | Stop reason: HOSPADM

## 2024-04-02 RX ORDER — ENOXAPARIN SODIUM 100 MG/ML
30 INJECTION SUBCUTANEOUS DAILY
Status: DISCONTINUED | OUTPATIENT
Start: 2024-04-02 | End: 2024-04-04 | Stop reason: HOSPADM

## 2024-04-02 RX ORDER — PALIPERIDONE 6 MG/1
6 TABLET, EXTENDED RELEASE ORAL EVERY MORNING
Status: DISCONTINUED | OUTPATIENT
Start: 2024-04-02 | End: 2024-04-02

## 2024-04-02 RX ORDER — SODIUM CHLORIDE 0.9 % (FLUSH) 0.9 %
10 SYRINGE (ML) INJECTION EVERY 12 HOURS SCHEDULED
Status: DISCONTINUED | OUTPATIENT
Start: 2024-04-02 | End: 2024-04-04 | Stop reason: HOSPADM

## 2024-04-02 RX ADMIN — ACETAMINOPHEN 650 MG: 325 TABLET ORAL at 09:37

## 2024-04-02 RX ADMIN — ENOXAPARIN SODIUM 30 MG: 100 INJECTION SUBCUTANEOUS at 09:37

## 2024-04-02 RX ADMIN — OSELTAMIVIR PHOSPHATE 30 MG: 30 CAPSULE ORAL at 20:47

## 2024-04-02 RX ADMIN — AZITHROMYCIN DIHYDRATE 250 MG: 250 TABLET ORAL at 22:36

## 2024-04-02 RX ADMIN — OSELTAMIVIR PHOSPHATE 30 MG: 30 CAPSULE ORAL at 09:37

## 2024-04-02 RX ADMIN — GUAIFENESIN 200 MG: 100 SOLUTION ORAL at 04:33

## 2024-04-02 RX ADMIN — ALBUTEROL SULFATE 2.5 MG: 2.5 SOLUTION RESPIRATORY (INHALATION) at 20:55

## 2024-04-02 RX ADMIN — ALBUTEROL SULFATE 2.5 MG: 2.5 SOLUTION RESPIRATORY (INHALATION) at 09:44

## 2024-04-02 RX ADMIN — IOPAMIDOL 75 ML: 755 INJECTION, SOLUTION INTRAVENOUS at 00:31

## 2024-04-02 RX ADMIN — MELATONIN TAB 3 MG 3 MG: 3 TAB at 20:47

## 2024-04-02 RX ADMIN — CEFTRIAXONE SODIUM 1000 MG: 1 INJECTION, POWDER, FOR SOLUTION INTRAMUSCULAR; INTRAVENOUS at 22:36

## 2024-04-02 RX ADMIN — BENZTROPINE MESYLATE 0.5 MG: 1 TABLET ORAL at 09:37

## 2024-04-02 RX ADMIN — Medication 10 ML: at 09:44

## 2024-04-02 ASSESSMENT — PAIN SCALES - GENERAL
PAINLEVEL_OUTOF10: 0
PAINLEVEL_OUTOF10: 0

## 2024-04-02 NOTE — PLAN OF CARE
Problem: Discharge Planning  Goal: Discharge to home or other facility with appropriate resources  4/2/2024 0954 by Leighton Read RN  Outcome: Progressing     Problem: Pain  Goal: Verbalizes/displays adequate comfort level or baseline comfort level  4/2/2024 0954 by Leighton Read RN  Outcome: Progressing     Problem: Safety - Adult  Goal: Free from fall injury  4/2/2024 0954 by Leighton Read RN  Outcome: Progressing

## 2024-04-02 NOTE — ED PROVIDER NOTES
Would like to admit 68-year-old patient with [facility] EMERGENCY DEPARTMENT  EMERGENCY DEPARTMENT ENCOUNTER      Pt Name: Gume Enrique  MRN: [id number]  Birthdate 1955  Date of evaluation: 4/1/2024  Provider: Leidy Beltran MD  PCP: Edward West MD  Note Started: 10:28 PM EDT 4/1/24    ED Attending Attestation Note     CHIEF COMPLAINT       Chief Complaint   Patient presents with   • Cough     Pt w c/o cough from Saturday. Pt w COPD     EMERGENCY DEPARTMENT COURSE and DIFFERENTIAL DIAGNOSIS/MDM:      This patient was seen by the advance practice provider.  In addition to the HERNAN I personally saw Gume Enrique and made/approved the management plan. I take responsibility for the patient management.     Briefly, this is a 68 y.o. male who presents to the emergency department 2/2 concern for cough. On arrival here initial triage vitals notable for fever and elevated heart rate. Ordered 1L IVF in addition to labs. Influenza A positive.     On my exam the patient is awake, alert, oriented.  He is present here with his wife.  Wife helps provide collateral history.  He has been ill since Saturday but had a cough for about a week.  Cough is productive of sputum.  He does have COPD and has been using his inhaler with improvement at home.  Today however he was more short of breath and wife was worried he looked worse so brought him in for further evaluation.    Workup here thus far is concerning for influenza A positive, on lung exam his left lower lobe sounds more congested versus the right.  He has faint wheezing lower lobes and with his history of COPD he is higher risk for exacerbation in addition to the influenza A.  As sick symptoms started on Saturday we will order Tamiflu.  Ordered breathing treatments in addition to antibiotics.  There is some concern for PE given he is tachycardic and oxygen saturation is 90% on room air when he is not exerting also.  I did ask the nurse to get him

## 2024-04-02 NOTE — ACP (ADVANCE CARE PLANNING)
Advanced Care Planning Note.    Purpose of Encounter: Advanced care planning in light of hospitalization  Parties In Attendance: Patient,    Decisional Capacity: Yes  Subjective: Patient  understand that this conversation is to address long term care goal  Objective: Patient  to the hospital with acute hypoxic respiratory failure secondary to influenza and pneumonia  Goals of Care Determination: Patient would pursue CPR and Intubation if required.  Code Status: full code  Time spent on Advanced care Plannin minutes  Advanced Care Planning Documents: documented patient's wishes, would like Wife Mirna to make medical decisions if unable to make decisions    Ligia Denton MD  2024 12:02 PM

## 2024-04-02 NOTE — ED NOTES
Notified RT of breathing tx  
Patient oriented to room and ED throughput process.  Safety measures with ED bed locked in lowest position and call light in reach.  Patient educated on all orders, including any medications.  Patient educated on chief complaint/symptoms. Patient encouraged to ask questions regarding care, medications or treatment plan.  Patient aware of how to reach staff with questions/concerns.  
RN assisted pt to walk approx 10 steps. SaO2 down to 87-88%. Returned to bed. Nasal canula placed with 3L/min running. SaO2 now at 95%  
RN called to verify that blood cultures are in lab.   
Verbal report given to Mannie AKERS  
Assessment No symptoms 04/01/24 1947   Infiltration Assessment 0 04/01/24 1947     PO Status: Regular  Pertinent or High Risk Medications/Drips: yes   If Yes, please provide details: antibiotics infusing  Pending Blood Product Administration: no       You may also review the ED PT Care Timeline found under the Summary Nursing Index tab.    Recommendation    Pending orders All ED orders complete  Plan for Discharge (if known):   Additional Comments: Pt is a&o. Per wife, pt has had some psychiatric changes in the last few years and will talk to himself at times. She wanted RN to be aware that this is his baseline mentation. Pt ambulated briefly with RN while in ED.    If any further questions, please call Sending RN at [phone]    Electronically signed by: Electronically signed by Paz Biggs RN on 4/1/2024 at 11:46 PM

## 2024-04-02 NOTE — H&P
HOSPITALISTS HISTORY AND PHYSICAL    4/2/2024 12:01 PM    Patient Information:  GUME MICHAEL is a 68 y.o. male [id number]  PCP:  Edward West MD (Tel: [phone] )    Chief complaint:    Chief Complaint   Patient presents with    Cough     Pt w c/o cough from Saturday. Pt w COPD       History of Present Illness:  Gume Michael is a 68 y.o. male been having subjective fevers and chills along with shortness of breath and nonproductive cough since last Saturday patient continuing his work thus came to the hospital denies any sick contacts recent travel does not smoke or drink does not wear oxygen at home    REVIEW OF SYSTEMS:     ENT: Negative for rhinorrhea, epistaxis, hoarseness, sore throat.    Cardiovascular: Negative for chest pain, palpitations   Gastrointestinal: Negative for nausea, vomiting, diarrhea  Genitourinary: Negative for polyuria, dysuria   Hematologic/Lymphatic: Negative for bleeding tendency, easy bruising  Musculoskeletal: Negative for myalgias and arthralgias  Neurologic: Negative for confusion,dysarthria.  Skin: Negative for itching,rash  Psychiatric: Negative for depression,anxiety, agitation.  Endocrine: Negative for polydipsia,polyuria,heat /cold intolerance.    Past Medical History:   has a past medical history of Arthritis, Aspergillus ([facility]), Bipolar 1 disorder ([facility]), Chronic bronchitis, obstructive ([facility]), COPD (chronic obstructive pulmonary disease) ([facility]), GERD (gastroesophageal reflux disease), SIRS (systemic inflammatory response syndrome) ([facility]), and Tourette's.     Past Surgical History:   has a past surgical history that includes Knee arthroscopy; Vasectomy; Tonsillectomy; Endoscopy, colon, diagnostic; Upper gastrointestinal endoscopy (9/25/15); bronchoscopy (N/A, 05/18/2018); Lung biopsy (12/2018); Upper gastrointestinal endoscopy (N/A, 2/21/2019); Upper gastrointestinal

## 2024-04-02 NOTE — PLAN OF CARE
Problem: Discharge Planning  Goal: Discharge to home or other facility with appropriate resources  Outcome: Progressing     Problem: Pain  Goal: Verbalizes/displays adequate comfort level or baseline comfort level  Outcome: Progressing     Problem: Safety - Adult  Goal: Free from fall injury  Outcome: Progressing     Problem: Respiratory - Adult  Goal: Achieves optimal ventilation and oxygenation  Outcome: Progressing     Problem: Infection - Adult  Goal: Absence of infection at discharge  Outcome: Progressing

## 2024-04-03 PROBLEM — J10.1 INFLUENZA A: Status: ACTIVE | Noted: 2024-04-03

## 2024-04-03 LAB
ALBUMIN SERPL-MCNC: 4 G/DL (ref 3.4–5)
ALBUMIN/GLOB SERPL: 1.5 {RATIO} (ref 1.1–2.2)
ALP SERPL-CCNC: 64 U/L (ref 40–129)
ALT SERPL-CCNC: 21 U/L (ref 10–40)
ANION GAP SERPL CALCULATED.3IONS-SCNC: 12 MMOL/L (ref 3–16)
AST SERPL-CCNC: 46 U/L (ref 15–37)
BASOPHILS # BLD: 0 K/UL (ref 0–0.2)
BASOPHILS NFR BLD: 0 %
BILIRUB SERPL-MCNC: 0.5 MG/DL (ref 0–1)
BUN SERPL-MCNC: 15 MG/DL (ref 7–20)
CALCIUM SERPL-MCNC: 9.2 MG/DL (ref 8.3–10.6)
CHLORIDE SERPL-SCNC: 107 MMOL/L (ref 99–110)
CO2 SERPL-SCNC: 21 MMOL/L (ref 21–32)
CREAT SERPL-MCNC: 0.8 MG/DL (ref 0.8–1.3)
DEPRECATED RDW RBC AUTO: 13.5 % (ref 12.4–15.4)
EOSINOPHIL # BLD: 0 K/UL (ref 0–0.6)
EOSINOPHIL NFR BLD: 0 %
GFR SERPLBLD CREATININE-BSD FMLA CKD-EPI: >90 ML/MIN/{1.73_M2}
GLUCOSE SERPL-MCNC: 122 MG/DL (ref 70–99)
HCT VFR BLD AUTO: 45.8 % (ref 40.5–52.5)
HGB BLD-MCNC: 15.7 G/DL (ref 13.5–17.5)
LYMPHOCYTES # BLD: 0.3 K/UL (ref 1–5.1)
LYMPHOCYTES NFR BLD: 2 %
MCH RBC QN AUTO: 31.2 PG (ref 26–34)
MCHC RBC AUTO-ENTMCNC: 34.3 G/DL (ref 31–36)
MCV RBC AUTO: 91.2 FL (ref 80–100)
MONOCYTES # BLD: 0 K/UL (ref 0–1.3)
MONOCYTES NFR BLD: 0 %
NEUTROPHILS # BLD: 15.4 K/UL (ref 1.7–7.7)
NEUTROPHILS NFR BLD: 94 %
NEUTS BAND NFR BLD MANUAL: 4 % (ref 0–7)
PLATELET # BLD AUTO: 215 K/UL (ref 135–450)
PLATELET BLD QL SMEAR: ADEQUATE
PMV BLD AUTO: 7.9 FL (ref 5–10.5)
POLYCHROMASIA BLD QL SMEAR: ABNORMAL
POTASSIUM SERPL-SCNC: 4.3 MMOL/L (ref 3.5–5.1)
PROT SERPL-MCNC: 6.7 G/DL (ref 6.4–8.2)
RBC # BLD AUTO: 5.02 M/UL (ref 4.2–5.9)
SLIDE REVIEW: ABNORMAL
SODIUM SERPL-SCNC: 140 MMOL/L (ref 136–145)
TOXIC GRANULES BLD QL SMEAR: PRESENT
WBC # BLD AUTO: 15.7 K/UL (ref 4–11)

## 2024-04-03 PROCEDURE — 2700000000 HC OXYGEN THERAPY PER DAY

## 2024-04-03 PROCEDURE — 36415 COLL VENOUS BLD VENIPUNCTURE: CPT

## 2024-04-03 PROCEDURE — 6360000002 HC RX W HCPCS: Performed by: INTERNAL MEDICINE

## 2024-04-03 PROCEDURE — 6370000000 HC RX 637 (ALT 250 FOR IP): Performed by: INTERNAL MEDICINE

## 2024-04-03 PROCEDURE — 6370000000 HC RX 637 (ALT 250 FOR IP): Performed by: STUDENT IN AN ORGANIZED HEALTH CARE EDUCATION/TRAINING PROGRAM

## 2024-04-03 PROCEDURE — 2580000003 HC RX 258: Performed by: STUDENT IN AN ORGANIZED HEALTH CARE EDUCATION/TRAINING PROGRAM

## 2024-04-03 PROCEDURE — 94640 AIRWAY INHALATION TREATMENT: CPT

## 2024-04-03 PROCEDURE — 94761 N-INVAS EAR/PLS OXIMETRY MLT: CPT

## 2024-04-03 PROCEDURE — 80053 COMPREHEN METABOLIC PANEL: CPT

## 2024-04-03 PROCEDURE — 85025 COMPLETE CBC W/AUTO DIFF WBC: CPT

## 2024-04-03 PROCEDURE — 6360000002 HC RX W HCPCS: Performed by: STUDENT IN AN ORGANIZED HEALTH CARE EDUCATION/TRAINING PROGRAM

## 2024-04-03 PROCEDURE — 1200000000 HC SEMI PRIVATE

## 2024-04-03 PROCEDURE — 2580000003 HC RX 258: Performed by: INTERNAL MEDICINE

## 2024-04-03 RX ORDER — OSELTAMIVIR PHOSPHATE 30 MG/1
30 CAPSULE ORAL 2 TIMES DAILY
Status: DISCONTINUED | OUTPATIENT
Start: 2024-04-03 | End: 2024-04-04 | Stop reason: HOSPADM

## 2024-04-03 RX ADMIN — BENZTROPINE MESYLATE 0.5 MG: 1 TABLET ORAL at 09:43

## 2024-04-03 RX ADMIN — ALBUTEROL SULFATE 2.5 MG: 2.5 SOLUTION RESPIRATORY (INHALATION) at 15:30

## 2024-04-03 RX ADMIN — TIOTROPIUM BROMIDE INHALATION SPRAY 2 PUFF: 3.12 SPRAY, METERED RESPIRATORY (INHALATION) at 09:06

## 2024-04-03 RX ADMIN — AZITHROMYCIN DIHYDRATE 250 MG: 250 TABLET ORAL at 23:26

## 2024-04-03 RX ADMIN — Medication 10 ML: at 20:41

## 2024-04-03 RX ADMIN — CEFTRIAXONE SODIUM 1000 MG: 1 INJECTION, POWDER, FOR SOLUTION INTRAMUSCULAR; INTRAVENOUS at 23:30

## 2024-04-03 RX ADMIN — OSELTAMIVIR PHOSPHATE 30 MG: 30 CAPSULE ORAL at 20:41

## 2024-04-03 RX ADMIN — ENOXAPARIN SODIUM 30 MG: 100 INJECTION SUBCUTANEOUS at 09:43

## 2024-04-03 RX ADMIN — MELATONIN TAB 3 MG 3 MG: 3 TAB at 20:41

## 2024-04-03 RX ADMIN — OSELTAMIVIR PHOSPHATE 30 MG: 30 CAPSULE ORAL at 09:43

## 2024-04-03 RX ADMIN — ACETAMINOPHEN 650 MG: 325 TABLET ORAL at 09:43

## 2024-04-03 RX ADMIN — Medication 10 ML: at 09:51

## 2024-04-03 RX ADMIN — PALIPERIDONE 6 MG: 6 TABLET, EXTENDED RELEASE ORAL at 09:46

## 2024-04-03 RX ADMIN — ALBUTEROL SULFATE 2.5 MG: 2.5 SOLUTION RESPIRATORY (INHALATION) at 08:47

## 2024-04-03 ASSESSMENT — PAIN DESCRIPTION - DESCRIPTORS: DESCRIPTORS: DISCOMFORT

## 2024-04-03 ASSESSMENT — PAIN DESCRIPTION - ORIENTATION: ORIENTATION: MID

## 2024-04-03 ASSESSMENT — PAIN SCALES - GENERAL: PAINLEVEL_OUTOF10: 2

## 2024-04-03 ASSESSMENT — PAIN DESCRIPTION - LOCATION: LOCATION: ABDOMEN

## 2024-04-04 VITALS
HEART RATE: 105 BPM | TEMPERATURE: 98.3 F | HEIGHT: 64 IN | SYSTOLIC BLOOD PRESSURE: 107 MMHG | BODY MASS INDEX: 16.73 KG/M2 | RESPIRATION RATE: 18 BRPM | WEIGHT: 98 LBS | OXYGEN SATURATION: 91 % | DIASTOLIC BLOOD PRESSURE: 71 MMHG

## 2024-04-04 LAB
ALBUMIN SERPL-MCNC: 3.6 G/DL (ref 3.4–5)
ALBUMIN/GLOB SERPL: 1.2 {RATIO} (ref 1.1–2.2)
ALP SERPL-CCNC: 68 U/L (ref 40–129)
ALT SERPL-CCNC: 24 U/L (ref 10–40)
ANION GAP SERPL CALCULATED.3IONS-SCNC: 14 MMOL/L (ref 3–16)
AST SERPL-CCNC: 45 U/L (ref 15–37)
BASOPHILS # BLD: 0 K/UL (ref 0–0.2)
BASOPHILS NFR BLD: 0.2 %
BILIRUB SERPL-MCNC: 0.5 MG/DL (ref 0–1)
BUN SERPL-MCNC: 19 MG/DL (ref 7–20)
CALCIUM SERPL-MCNC: 8.6 MG/DL (ref 8.3–10.6)
CHLORIDE SERPL-SCNC: 107 MMOL/L (ref 99–110)
CO2 SERPL-SCNC: 20 MMOL/L (ref 21–32)
CREAT SERPL-MCNC: 0.7 MG/DL (ref 0.8–1.3)
DEPRECATED RDW RBC AUTO: 13.3 % (ref 12.4–15.4)
EKG ATRIAL RATE: 105 BPM
EKG DIAGNOSIS: NORMAL
EKG P AXIS: 91 DEGREES
EKG P-R INTERVAL: 162 MS
EKG Q-T INTERVAL: 306 MS
EKG QRS DURATION: 66 MS
EKG QTC CALCULATION (BAZETT): 404 MS
EKG R AXIS: 44 DEGREES
EKG T AXIS: 63 DEGREES
EKG VENTRICULAR RATE: 105 BPM
EOSINOPHIL # BLD: 0 K/UL (ref 0–0.6)
EOSINOPHIL NFR BLD: 0 %
GFR SERPLBLD CREATININE-BSD FMLA CKD-EPI: >90 ML/MIN/{1.73_M2}
GLUCOSE SERPL-MCNC: 108 MG/DL (ref 70–99)
HCT VFR BLD AUTO: 41 % (ref 40.5–52.5)
HGB BLD-MCNC: 14.5 G/DL (ref 13.5–17.5)
LYMPHOCYTES # BLD: 0.7 K/UL (ref 1–5.1)
LYMPHOCYTES NFR BLD: 4.9 %
MCH RBC QN AUTO: 32.1 PG (ref 26–34)
MCHC RBC AUTO-ENTMCNC: 35.5 G/DL (ref 31–36)
MCV RBC AUTO: 90.6 FL (ref 80–100)
MONOCYTES # BLD: 0.6 K/UL (ref 0–1.3)
MONOCYTES NFR BLD: 4.2 %
NEUTROPHILS # BLD: 13.1 K/UL (ref 1.7–7.7)
NEUTROPHILS NFR BLD: 90.7 %
PLATELET # BLD AUTO: 233 K/UL (ref 135–450)
PMV BLD AUTO: 7.2 FL (ref 5–10.5)
POTASSIUM SERPL-SCNC: 4.3 MMOL/L (ref 3.5–5.1)
PROT SERPL-MCNC: 6.6 G/DL (ref 6.4–8.2)
RBC # BLD AUTO: 4.52 M/UL (ref 4.2–5.9)
SODIUM SERPL-SCNC: 141 MMOL/L (ref 136–145)
WBC # BLD AUTO: 14.4 K/UL (ref 4–11)

## 2024-04-04 PROCEDURE — 2700000000 HC OXYGEN THERAPY PER DAY

## 2024-04-04 PROCEDURE — 80053 COMPREHEN METABOLIC PANEL: CPT

## 2024-04-04 PROCEDURE — 93005 ELECTROCARDIOGRAM TRACING: CPT | Performed by: STUDENT IN AN ORGANIZED HEALTH CARE EDUCATION/TRAINING PROGRAM

## 2024-04-04 PROCEDURE — 85025 COMPLETE CBC W/AUTO DIFF WBC: CPT

## 2024-04-04 PROCEDURE — 97535 SELF CARE MNGMENT TRAINING: CPT

## 2024-04-04 PROCEDURE — 97110 THERAPEUTIC EXERCISES: CPT

## 2024-04-04 PROCEDURE — 6360000002 HC RX W HCPCS: Performed by: INTERNAL MEDICINE

## 2024-04-04 PROCEDURE — 94680 O2 UPTK RST&XERS DIR SIMPLE: CPT

## 2024-04-04 PROCEDURE — 2580000003 HC RX 258: Performed by: INTERNAL MEDICINE

## 2024-04-04 PROCEDURE — 94640 AIRWAY INHALATION TREATMENT: CPT

## 2024-04-04 PROCEDURE — 6370000000 HC RX 637 (ALT 250 FOR IP): Performed by: INTERNAL MEDICINE

## 2024-04-04 PROCEDURE — 94761 N-INVAS EAR/PLS OXIMETRY MLT: CPT

## 2024-04-04 PROCEDURE — 36415 COLL VENOUS BLD VENIPUNCTURE: CPT

## 2024-04-04 PROCEDURE — 6370000000 HC RX 637 (ALT 250 FOR IP): Performed by: STUDENT IN AN ORGANIZED HEALTH CARE EDUCATION/TRAINING PROGRAM

## 2024-04-04 RX ORDER — OSELTAMIVIR PHOSPHATE 30 MG/1
30 CAPSULE ORAL 2 TIMES DAILY
Qty: 6 CAPSULE | Refills: 0 | Status: SHIPPED | OUTPATIENT
Start: 2024-04-04 | End: 2024-04-07

## 2024-04-04 RX ADMIN — ENOXAPARIN SODIUM 30 MG: 100 INJECTION SUBCUTANEOUS at 10:06

## 2024-04-04 RX ADMIN — BENZTROPINE MESYLATE 0.5 MG: 1 TABLET ORAL at 10:06

## 2024-04-04 RX ADMIN — OSELTAMIVIR PHOSPHATE 30 MG: 30 CAPSULE ORAL at 10:06

## 2024-04-04 RX ADMIN — Medication 10 ML: at 10:07

## 2024-04-04 RX ADMIN — ALBUTEROL SULFATE 2.5 MG: 2.5 SOLUTION RESPIRATORY (INHALATION) at 07:52

## 2024-04-04 RX ADMIN — PALIPERIDONE 6 MG: 6 TABLET, EXTENDED RELEASE ORAL at 10:06

## 2024-04-04 RX ADMIN — TIOTROPIUM BROMIDE INHALATION SPRAY 2 PUFF: 3.12 SPRAY, METERED RESPIRATORY (INHALATION) at 08:28

## 2024-04-04 NOTE — PROGRESS NOTES
Hospitalist Progress Note    Name:  Gume Enrique    /Age/Sex: 1955  (68 y.o. male)  MRN & CSN:  [id number] & [id number]    PCP: Edward West MD    Date of Admission: 2024    Patient Status:  Inpatient     Chief Complaint:   Chief Complaint   Patient presents with    Cough     Pt w c/o cough from Saturday. Pt w COPD       Hospital Course:   Admitted for cough and shortness of breath.  Found to be positive for influenza A.  Started on Tamiflu.  Requiring supplemental oxygen.    Subjective:  Today is:  Hospital Day: 3.  Patient seen and examined in [id number]-.     Sitting up in bed.  Cough is much improved.  Denies chest pain or shortness of breath.  Eating okay.      Medications:  Reviewed    Infusion Medications    sodium chloride       Scheduled Medications    oseltamivir  30 mg Oral BID    tiotropium  2 puff Inhalation Daily RT    sodium chloride flush  10 mL IntraVENous 2 times per day    enoxaparin  30 mg SubCUTAneous Daily    melatonin  3 mg Oral Nightly    nicotine  1 patch TransDERmal Daily    albuterol  2.5 mg Nebulization TID RT    benztropine  0.5 mg Oral Daily    paliperidone  6 mg Oral Daily    cefTRIAXone (ROCEPHIN) IV  1,000 mg IntraVENous Q24H    azithromycin  250 mg Oral Q24H     PRN Meds: sodium chloride flush, sodium chloride, potassium chloride **OR** potassium alternative oral replacement **OR** potassium chloride, magnesium sulfate, promethazine **OR** ondansetron, acetaminophen **OR** acetaminophen, albuterol sulfate HFA, albuterol, ipratropium      Intake/Output Summary (Last 24 hours) at 4/3/2024 1549  Last data filed at 4/3/2024 1541  Gross per 24 hour   Intake 200 ml   Output 200 ml   Net 0 ml       Physical Exam Performed:    /83   Pulse 59   Temp 97.8 °F (36.6 °C) (Axillary)   Resp 18   Ht 1.626 m (5' 4\")   Wt 44.5 kg (98 lb)   SpO2 92%   BMI 16.82 kg/m²     General appearance: No apparent distress, appears stated age and cooperative.  
   04/02/24 0607   RT Protocol   History Pulmonary Disease 2   Respiratory pattern 2   Breath sounds 2   Cough 1   Indications for Bronchodilator Therapy Decreased or absent breath sounds   Bronchodilator Assessment Score 7       
   04/04/24 0842   Resting (Room Air)   SpO2 87      Resting (On O2)   SpO2 91      O2 Device Nasal cannula   O2 Flow Rate (l/min) 1 l/min   During Walk (On O2)   SpO2 92      O2 Device Nasal cannula   O2 Flow Rate (l/min) 2 l/min   Rate of Dyspnea 2   After Walk   SpO2 91      O2 Device Nasal cannula   O2 Flow Rate (l/min) 1 l/min   Rate of Dyspnea 1   Does the Patient Qualify for Home O2 Yes   Liter Flow at Rest 1   Liter Flow on Exertion 2   Does the Patient Need Portable Oxygen Tanks Yes       
  [facility] - Inpatient Rehabilitation Department   Phone: [phone]    Occupational Therapy    [x] Initial Evaluation            [] Daily Treatment Note         [] Discharge Summary      Patient: Gume Enrique   : 1955   MRN: [id number]   Date of Service:  2024    Admitting Diagnosis:  Acute respiratory failure with hypoxia (HCC)  Current Admission Summary: Gume Enrique is a 68 y.o. male who presents to the emergency department with chief complaint of cough.  Wife states he has been constantly coughing for the last 2 days.  Wife has been sick with flulike symptoms, but reports her presentation has been much more mild.  Wife states patient does have history of COPD.  His symptoms have included cough, fatigue and some \"fogginess\".  He has some upper chest pain with coughing only.  No nasal congestion, fever, GI symptoms or other acute complaints.  Patient does report some mild shortness of breath, but states this really just occurs when he has \"coughing fits\".     Past Medical History:  has a past medical history of Arthritis, Aspergillus (HCC), Bipolar 1 disorder (HCC), Chronic bronchitis, obstructive (HCC), COPD (chronic obstructive pulmonary disease) (HCC), GERD (gastroesophageal reflux disease), SIRS (systemic inflammatory response syndrome) (HCC), and Tourette's.  Past Surgical History:  has a past surgical history that includes Knee arthroscopy; Vasectomy; Tonsillectomy; Endoscopy, colon, diagnostic; Upper gastrointestinal endoscopy (9/25/15); bronchoscopy (N/A, 2018); Lung biopsy (2018); Upper gastrointestinal endoscopy (N/A, 2019); Upper gastrointestinal endoscopy (N/A, 2019); and bronchoscopy (N/A, 2019).    Discharge Recommendations: Gume Enrique scored a 19/24 on the AM-PAC ADL Inpatient form. Current research shows that an AM-PAC score of 18 or greater is typically associated with a discharge to the patient's home setting. Based on the patient's 
  [facility] - Inpatient Rehabilitation Department   Phone: [phone]    Occupational Therapy    [] Initial Evaluation            [x] Daily Treatment Note         [] Discharge Summary      Patient: Gume Enrique   : 1955   MRN: [id number]   Date of Service:  2024    Admitting Diagnosis:  Acute respiratory failure with hypoxia (HCC)  Current Admission Summary: Gume Enrique is a 68 y.o. male who presents to the emergency department with chief complaint of cough.  Wife states he has been constantly coughing for the last 2 days.  Wife has been sick with flulike symptoms, but reports her presentation has been much more mild.  Wife states patient does have history of COPD.  His symptoms have included cough, fatigue and some \"fogginess\".  He has some upper chest pain with coughing only.  No nasal congestion, fever, GI symptoms or other acute complaints.  Patient does report some mild shortness of breath, but states this really just occurs when he has \"coughing fits\".     Past Medical History:  has a past medical history of Arthritis, Aspergillus (HCC), Bipolar 1 disorder (HCC), Chronic bronchitis, obstructive (HCC), COPD (chronic obstructive pulmonary disease) (HCC), GERD (gastroesophageal reflux disease), SIRS (systemic inflammatory response syndrome) (HCC), and Tourette's.  Past Surgical History:  has a past surgical history that includes Knee arthroscopy; Vasectomy; Tonsillectomy; Endoscopy, colon, diagnostic; Upper gastrointestinal endoscopy (9/25/15); bronchoscopy (N/A, 2018); Lung biopsy (2018); Upper gastrointestinal endoscopy (N/A, 2019); Upper gastrointestinal endoscopy (N/A, 2019); and bronchoscopy (N/A, 2019).    Discharge Recommendations: Gume Enrique scored a 21/24 on the AM-PAC ADL Inpatient form. Current research shows that an AM-PAC score of 18 or greater is typically associated with a discharge to the patient's home setting. Based on the patient's 
  Physician Progress Note      PATIENT:               JOEY MICHAEL  CSN #:                  [id number]  :                       1955  ADMIT DATE:       2024 6:58 PM  DISCH DATE:  RESPONDING  PROVIDER #:        Roby Marques DO          QUERY TEXT:    Pt admitted with Acute respiratory failure with hypoxia. Pt noted to have Temp   102.1F, RR 37,HR  120, WBC 15.7, Procalcitonin 0.32. If possible, please   document in the progress notes and discharge summary if you are evaluating and   /or treating any of the following:    The medical record reflects the following:  Risk Factors: Influenza A, Pneumonia  Clinical Indicators: Acute hypoxic respiratory failure secondary to influenza   A and likely bacterial pneumonia  WBC-15.7, 10.5 ,13.9  Temp-102.1 F, 101 F  HR-120, 119, 116  RR-37, 33, 27  Procalcitonin-0.32  Treatment: IV Rocephin, IV Fluid  Options provided:  -- Sepsis due to Influenza A, present on admission  -- Influenza A without Sepsis  -- Other - I will add my own diagnosis  -- Disagree - Not applicable / Not valid  -- Disagree - Clinically unable to determine / Unknown  -- Refer to Clinical Documentation Reviewer    PROVIDER RESPONSE TEXT:    This patient has sepsis due to influenza A which was present on admission.    Query created by: Mariangel Seo on 2024 7:17 AM      Electronically signed by:  Roby Marques DO 2024 10:40 AM          
AM assessments completed. VSS. Morning meds given, well tolerated. Alert and oriented x4.    The care plan and education has been reviewed and mutually agreed upon with the patient.    
CLINICAL PHARMACY NOTE: MEDS TO BEDS    Total # of Prescriptions Filled: 1   The following medications were delivered to the patient:  OSELTAMIVIR PHOSPHATE 30MG CAPS    Additional Documentation: Rhonda AKERS approved to deliver medications to patient room=signed  [facility] Tech  
Discharge instructions and medications gone over with patient. Patient verbalizes understanding discharge instructions and medications. All questions answered.   
Morning assessment completed, vss, alert abd oriented, schedule meds given,The care plan and education has been reviewed and mutually agreed upon with the patient.  Pt remains free from falls. Fall precautions in place--bed in lowest position, call light within reach, bed alarm in use. Pt aware to call for assistance before getting up.   
Report given to 4T rn all questions answered. All belongings with pt. Disconnected from ed monitoring system  
Shift assessment completed, vss, alert abd oriented, schedule meds given per MAR, The care plan and education has been reviewed and mutually agreed upon with the patient. Pt remains free from falls. Fall precautions in place--bed in lowest position, call light within reach,Pt aware to call for assistance before getting up.         
increased sway initially but this improved with time up.     Other Therapeutic Interventions  Pt declined all ADLs.     Education on PLB, incentive spirometer, and role of therapy in acute vs home.     Functional Outcomes  AM-PAC Inpatient Mobility Raw Score : 18              Cognition  WFL  Orientation:    alert and oriented x 4  Command Following:   WFL    Education  Barriers To Learning: none  Patient Education: patient educated on goals, PT role and benefits, plan of care, general safety, functional mobility training, disease specific education, transfer training, discharge recommendations  Learning Assessment:  patient verbalizes understanding, would benefit from continued reinforcement    Assessment  Activity Tolerance: Pt with increased coughing spells with upright positioning. Good tolerance to activity with stable HR and SpO2.    Reclined at rest - /65,  bpm, SpO2 93% on 3L   Sitting EOB -  bpm, SpO2 95% on 3L   After ambulation - HR 95 bpm, SpO2 93% on 3L   After ambulation 2 -  bpm, SpO2 93% on 3L  Impairments Requiring Therapeutic Intervention: decreased functional mobility, decreased endurance, decreased balance, decreased IADL  Prognosis: good  Clinical Assessment: The patient is a 69 yo male admitted to [facility] for PNA and Flu A. The patient is from home independently with his spouse. At this time the patient requires increased supplemental O2 from baseline to maintain SpO2 >90% and presents with mild balance deficits from baseline. Recommending continued skilled PT while in the acute setting to safely promote return to baseline level of function. Currently recommending home PT at discharge however pt may not need, will continue to assess.   Safety Interventions: patient left in chair, chair alarm in place, call light within reach, gait belt, nurse notified, and family/caregiver present    Plan  Frequency: 3-5 x/per week  Current Treatment Recommendations: balance training,

## 2024-04-04 NOTE — DISCHARGE SUMMARY
airspace disease.   2. Severe emphysematous changes of the lungs.   3. Redemonstration of right upper lobe lung nodule.                Consults:     None    F/U APPTS:  No follow-up provider specified.    Diet:  regular diet     Activity:  activity as tolerated    Disposition:  Discharged to Home    Rehab: Patient returned to prior level of function, rehabilitation not indicated at this time    Condition at Discharge: Stable    Code Status:  Full Code     Discharge Medications:     Current Discharge Medication List             Details   oseltamivir (TAMIFLU) 30 MG capsule Take 1 capsule by mouth 2 times daily for 6 doses  Qty: 6 capsule, Refills: 0                Details   umeclidinium bromide (INCRUSE ELLIPTA) 62.5 MCG/ACT inhaler Inhale 1 puff into the lungs daily  Qty: 1 each, Refills: 11      aclidinium (TUDORZA PRESSAIR) 400 MCG/ACT AEPB inhaler Inhale 1 puff into the lungs 2 times daily  Qty: 1 each, Refills: 11      albuterol sulfate HFA (PROVENTIL;VENTOLIN;PROAIR) 108 (90 Base) MCG/ACT inhaler Inhale 2 puffs into the lungs every 6 hours as needed for Wheezing  Qty: 18 g, Refills: 11    Associated Diagnoses: COPD, mild (HCC)      !! tiotropium (SPIRIVA RESPIMAT) 2.5 MCG/ACT AERS inhaler Inhale 2 puffs into the lungs daily  Qty: 12 g, Refills: 3      !! tiotropium (SPIRIVA RESPIMAT) 2.5 MCG/ACT AERS inhaler INHALE 2 PUFFS INTO THE LUNGS DAILY  Qty: 12 g, Refills: 0      paliperidone (INVEGA) 6 MG extended release tablet Take 1 tablet by mouth every morning  Qty: 30 tablet, Refills: 0    Associated Diagnoses: Bipolar disorder, in full remission, most recent episode depressed (HCC)      benztropine (COGENTIN) 0.5 MG tablet Take 1 tablet by mouth 2 times daily  Qty: 60 tablet, Refills: 0    Associated Diagnoses: Bipolar disorder, in full remission, most recent episode depressed (HCC)      ketoconazole (NIZORAL) 2 % shampoo Apply topically daily as needed(use as wash daily  Qty: 1 each, Refills: 0    Associated

## 2024-04-04 NOTE — PLAN OF CARE
Problem: Discharge Planning  Goal: Discharge to home or other facility with appropriate resources  4/4/2024 1146 by Rhonda Rogers, RN  Outcome: Progressing     Problem: Pain  Goal: Verbalizes/displays adequate comfort level or baseline comfort level  4/4/2024 1146 by Rhonda Rogers, RN  Outcome: Progressing     Problem: Safety - Adult  Goal: Free from fall injury  4/4/2024 1146 by Rhonda Rogers, RN  Outcome: Progressing     Problem: Respiratory - Adult  Goal: Achieves optimal ventilation and oxygenation  4/4/2024 1146 by Rhonda Rogers, RN  Outcome: Progressing     Problem: Infection - Adult  Goal: Absence of infection at discharge  4/4/2024 1146 by Rhonda Rogers, RN  Outcome: Progressing

## 2024-04-04 NOTE — PLAN OF CARE
Problem: Discharge Planning  Goal: Discharge to home or other facility with appropriate resources  4/3/2024 2357 by Mariangel Garcia RN  Outcome: Progressing     Problem: Pain  Goal: Verbalizes/displays adequate comfort level or baseline comfort level  4/3/2024 2357 by Mariangel Garcia RN  Outcome: Progressing     Problem: Safety - Adult  Goal: Free from fall injury  4/3/2024 2357 by Mariangel Garcia RN  Outcome: Progressing     Problem: Respiratory - Adult  Goal: Achieves optimal ventilation and oxygenation  4/3/2024 2357 by Mariangel Garcia RN  Outcome: Progressing     Problem: Infection - Adult  Goal: Absence of infection at discharge  4/3/2024 2357 by Mariangel Garcia RN  Outcome: Progressing

## 2024-04-04 NOTE — PLAN OF CARE
Problem: Discharge Planning  Goal: Discharge to home or other facility with appropriate resources  4/4/2024 1156 by Rhonda Rogers, RN  Outcome: Completed     Problem: Pain  Goal: Verbalizes/displays adequate comfort level or baseline comfort level  4/4/2024 1156 by Rhonda Rogers, RN  Outcome: Completed     Problem: Safety - Adult  Goal: Free from fall injury  4/4/2024 1155 by Rhonda Rogers, RN  Outcome: Completed     Problem: Respiratory - Adult  Goal: Achieves optimal ventilation and oxygenation  4/4/2024 1155 by Rhonda Rogers, RN  Outcome: Completed     Problem: Infection - Adult  Goal: Absence of infection at discharge  4/4/2024 1155 by Rhonda Rogers, RN  Outcome: Completed

## 2024-04-04 NOTE — CARE COORDINATION
Discharge Planning Note:    Update:    -PT/OT recommends HHC    Met with the patient. HHc options were reviewed and the patient is declining any HHC at this time.    Will continue to follow.    JASMYN Yepez RN    [facility]  Phone: [phone]    
[facility] received a referral to this patient for home 02 @ 1 lpm cont via nc and a portable concentrator at pulse dose of 1.  Home 02 has been arranged for delivery. Pt and spouse are aware to call [facility] [phone] to initiate setup.  Portable concentrator has been delivered to the patient's room for discharge.      Thank you for the referral.  Electronically signed by STELLA SEN on 4/4/2024 at 11:06 AM  Cell ph# [phone]    NOTE: After 5:00 pm, Weekends, Holidays: Call [facility] at [phone] to coordinate delivery of home medical equipment.    
Department  Ph: [phone] Fax: [phone]

## 2024-04-05 ENCOUNTER — TELEPHONE (OUTPATIENT)
Dept: INTERNAL MEDICINE CLINIC | Age: 69
End: 2024-04-05

## 2024-04-05 LAB
BACTERIA BLD CULT ORG #2: NORMAL
BACTERIA BLD CULT: NORMAL

## 2024-04-05 NOTE — TELEPHONE ENCOUNTER
Care Transitions Initial Follow Up Call    Outreach made within 2 business days of discharge: Yes    Patient: Gume Enrique Patient : 1955   MRN: [id number]  Reason for Admission: There are no discharge diagnoses documented for the most recent discharge.  Discharge Date: 24       Spoke with: Wife    Discharge department/facility: [facility] Interactive Patient Contact:  Was patient able to fill all prescriptions: Yes  Was patient instructed to bring all medications to the follow-up visit: Yes  Is patient taking all medications as directed in the discharge summary? Yes  Does patient understand their discharge instructions: Yes  Does patient have questions or concerns that need addressed prior to 7-14 day follow up office visit: yes - Would like to know if he would have to stay on oxygen     Scheduled appointment with PCP within 7-14 days    Follow Up  Future Appointments   Date Time Provider Department Center   2024  1:30 PM Margie Ridley DO [facility]   2024  2:30 PM Edward West MD [facility]   2025  1:00 PM Edward West MD [facility]  [facility] [place] MA

## 2024-04-06 LAB
BACTERIA BLD CULT ORG #2: NORMAL
BACTERIA BLD CULT: NORMAL

## 2024-04-11 ENCOUNTER — OFFICE VISIT (OUTPATIENT)
Dept: INTERNAL MEDICINE CLINIC | Age: 69
End: 2024-04-11

## 2024-04-11 VITALS
WEIGHT: 94.2 LBS | BODY MASS INDEX: 16.17 KG/M2 | SYSTOLIC BLOOD PRESSURE: 102 MMHG | OXYGEN SATURATION: 94 % | DIASTOLIC BLOOD PRESSURE: 74 MMHG | HEART RATE: 76 BPM

## 2024-04-11 DIAGNOSIS — J16.0 CAP (COMMUNITY ACQUIRED PNEUMONIA) DUE TO CHLAMYDIA SPECIES: ICD-10-CM

## 2024-04-11 DIAGNOSIS — J43.2 CENTRILOBULAR EMPHYSEMA (HCC): ICD-10-CM

## 2024-04-11 DIAGNOSIS — J10.1 INFLUENZA A: ICD-10-CM

## 2024-04-11 DIAGNOSIS — Z09 HOSPITAL DISCHARGE FOLLOW-UP: Primary | ICD-10-CM

## 2024-04-11 PROBLEM — J96.01 ACUTE RESPIRATORY FAILURE WITH HYPOXIA (HCC): Status: RESOLVED | Noted: 2024-04-02 | Resolved: 2024-04-11

## 2024-04-11 PROBLEM — I49.8 ATRIAL BIGEMINY: Status: ACTIVE | Noted: 2024-04-11

## 2024-04-11 ASSESSMENT — PATIENT HEALTH QUESTIONNAIRE - PHQ9
9. THOUGHTS THAT YOU WOULD BE BETTER OFF DEAD, OR OF HURTING YOURSELF: NOT AT ALL
10. IF YOU CHECKED OFF ANY PROBLEMS, HOW DIFFICULT HAVE THESE PROBLEMS MADE IT FOR YOU TO DO YOUR WORK, TAKE CARE OF THINGS AT HOME, OR GET ALONG WITH OTHER PEOPLE: NOT DIFFICULT AT ALL
7. TROUBLE CONCENTRATING ON THINGS, SUCH AS READING THE NEWSPAPER OR WATCHING TELEVISION: NOT AT ALL
3. TROUBLE FALLING OR STAYING ASLEEP: NOT AT ALL
SUM OF ALL RESPONSES TO PHQ QUESTIONS 1-9: 0
8. MOVING OR SPEAKING SO SLOWLY THAT OTHER PEOPLE COULD HAVE NOTICED. OR THE OPPOSITE, BEING SO FIGETY OR RESTLESS THAT YOU HAVE BEEN MOVING AROUND A LOT MORE THAN USUAL: NOT AT ALL
1. LITTLE INTEREST OR PLEASURE IN DOING THINGS: NOT AT ALL
SUM OF ALL RESPONSES TO PHQ QUESTIONS 1-9: 0
SUM OF ALL RESPONSES TO PHQ9 QUESTIONS 1 & 2: 0
4. FEELING TIRED OR HAVING LITTLE ENERGY: NOT AT ALL
SUM OF ALL RESPONSES TO PHQ QUESTIONS 1-9: 0
SUM OF ALL RESPONSES TO PHQ QUESTIONS 1-9: 0
2. FEELING DOWN, DEPRESSED OR HOPELESS: NOT AT ALL
5. POOR APPETITE OR OVEREATING: NOT AT ALL
6. FEELING BAD ABOUT YOURSELF - OR THAT YOU ARE A FAILURE OR HAVE LET YOURSELF OR YOUR FAMILY DOWN: NOT AT ALL

## 2024-04-11 ASSESSMENT — ANXIETY QUESTIONNAIRES
4. TROUBLE RELAXING: NOT AT ALL
3. WORRYING TOO MUCH ABOUT DIFFERENT THINGS: NOT AT ALL
6. BECOMING EASILY ANNOYED OR IRRITABLE: NOT AT ALL
1. FEELING NERVOUS, ANXIOUS, OR ON EDGE: NOT AT ALL
7. FEELING AFRAID AS IF SOMETHING AWFUL MIGHT HAPPEN: NOT AT ALL
GAD7 TOTAL SCORE: 0
5. BEING SO RESTLESS THAT IT IS HARD TO SIT STILL: NOT AT ALL
2. NOT BEING ABLE TO STOP OR CONTROL WORRYING: NOT AT ALL
IF YOU CHECKED OFF ANY PROBLEMS ON THIS QUESTIONNAIRE, HOW DIFFICULT HAVE THESE PROBLEMS MADE IT FOR YOU TO DO YOUR WORK, TAKE CARE OF THINGS AT HOME, OR GET ALONG WITH OTHER PEOPLE: NOT DIFFICULT AT ALL

## 2024-04-12 NOTE — PROGRESS NOTES
Patient scheduled for 4/15/2024  
bigeminy       Medications listed as ordered at the time of discharge from hospital     Medication List            Accurate as of April 11, 2024  2:36 PM. If you have any questions, ask your nurse or doctor.                CONTINUE taking these medications      * albuterol sulfate  (90 Base) MCG/ACT inhaler  Commonly known as: PROVENTIL;VENTOLIN;PROAIR  Inhale 2 puffs into the lungs every 6 hours as needed for Wheezing     * albuterol sulfate  (90 Base) MCG/ACT inhaler  Commonly known as: PROVENTIL;VENTOLIN;PROAIR  Inhale 2 puffs into the lungs every 6 hours as needed for Wheezing     benztropine 0.5 MG tablet  Commonly known as: COGENTIN  Take 1 tablet by mouth 2 times daily     Incruse Ellipta 62.5 MCG/ACT inhaler  Generic drug: umeclidinium bromide  Inhale 1 puff into the lungs daily     MULTIVITAMIN PO     paliperidone 6 MG extended release tablet  Commonly known as: Invega  Take 1 tablet by mouth every morning           * This list has 2 medication(s) that are the same as other medications prescribed for you. Read the directions carefully, and ask your doctor or other care provider to review them with you.                    Medications marked \"taking\" at this time  Outpatient Medications Marked as Taking for the 4/11/24 encounter (Office Visit) with Margie Ridley, DO   Medication Sig Dispense Refill    umeclidinium bromide (INCRUSE ELLIPTA) 62.5 MCG/ACT inhaler Inhale 1 puff into the lungs daily 1 each 11    albuterol sulfate HFA (PROVENTIL;VENTOLIN;PROAIR) 108 (90 Base) MCG/ACT inhaler Inhale 2 puffs into the lungs every 6 hours as needed for Wheezing 18 g 11    paliperidone (INVEGA) 6 MG extended release tablet Take 1 tablet by mouth every morning 30 tablet 0    benztropine (COGENTIN) 0.5 MG tablet Take 1 tablet by mouth 2 times daily 60 tablet 0    Multiple Vitamin (MULTIVITAMIN PO) Take 1 capsule by mouth daily      albuterol sulfate  (90 Base) MCG/ACT inhaler Inhale 2 puffs into the

## 2024-04-15 ENCOUNTER — OFFICE VISIT (OUTPATIENT)
Dept: PULMONOLOGY | Age: 69
End: 2024-04-15
Payer: COMMERCIAL

## 2024-04-15 VITALS
WEIGHT: 92.6 LBS | BODY MASS INDEX: 15.81 KG/M2 | SYSTOLIC BLOOD PRESSURE: 100 MMHG | HEART RATE: 102 BPM | DIASTOLIC BLOOD PRESSURE: 78 MMHG | OXYGEN SATURATION: 92 % | HEIGHT: 64 IN

## 2024-04-15 DIAGNOSIS — J43.2 CENTRILOBULAR EMPHYSEMA (HCC): ICD-10-CM

## 2024-04-15 DIAGNOSIS — J47.9 BRONCHIECTASIS WITHOUT COMPLICATION (HCC): ICD-10-CM

## 2024-04-15 DIAGNOSIS — J44.9 COPD, MILD (HCC): Primary | ICD-10-CM

## 2024-04-15 DIAGNOSIS — R91.1 PULMONARY NODULE: ICD-10-CM

## 2024-04-15 DIAGNOSIS — J96.01 ACUTE HYPOXEMIC RESPIRATORY FAILURE (HCC): ICD-10-CM

## 2024-04-15 PROCEDURE — 3017F COLORECTAL CA SCREEN DOC REV: CPT | Performed by: INTERNAL MEDICINE

## 2024-04-15 PROCEDURE — G8427 DOCREV CUR MEDS BY ELIG CLIN: HCPCS | Performed by: INTERNAL MEDICINE

## 2024-04-15 PROCEDURE — 1036F TOBACCO NON-USER: CPT | Performed by: INTERNAL MEDICINE

## 2024-04-15 PROCEDURE — 99213 OFFICE O/P EST LOW 20 MIN: CPT | Performed by: INTERNAL MEDICINE

## 2024-04-15 PROCEDURE — G8419 CALC BMI OUT NRM PARAM NOF/U: HCPCS | Performed by: INTERNAL MEDICINE

## 2024-04-15 PROCEDURE — 1123F ACP DISCUSS/DSCN MKR DOCD: CPT | Performed by: INTERNAL MEDICINE

## 2024-04-15 PROCEDURE — 1111F DSCHRG MED/CURRENT MED MERGE: CPT | Performed by: INTERNAL MEDICINE

## 2024-04-15 PROCEDURE — 3023F SPIROM DOC REV: CPT | Performed by: INTERNAL MEDICINE

## 2024-04-15 NOTE — PROGRESS NOTES
[facility]  Follow Up Note  Seamus Barraza MD       Gume Enrique   YOB: 1955    Date of Visit:  4/15/2024    Assessment/Plan:  1. COPD, mild (HCC)  Stable  PFT 10/17:  INTERPRETATION:  Spirometry attempts were acceptable and reproducible.  FVC was normal at 3.28 L, 88% predicted and decreased FEV1 of 1.99 L,  71% predicted.  FEV1/FVC ratio was decreased at 61%.  No response to  bronchodilators demonstrated on spirometry.  Lung volumes showed  normal total lung capacity of 108% predicted.  Diffusion capacity  showed decreased DLCO of 47% predicted.     IMPRESSION:  Mild obstructive defect on spirometry with no response to  bronchodilators.  Normal lung volumes and moderate decrease in  diffusion capacity.    Medication management:  Spiriva ==> very expensive for him ==> He is now on Incruse  Cost is an issue for him  Albuterol    2. Centrilobular emphysema (HCC)  Stable  Noted on CT imaging  Emphysema is severe    3. Bronchiectasis without complication (HCC)  Stable  Also noted on CT imaging    4. Pulmonary nodule  Stable/Improved  CT Chest 4/24:  FINDINGS:  Pulmonary Arteries: Pulmonary arteries are adequately opacified for  evaluation.  No evidence of intraluminal filling defect to suggest pulmonary  embolism.  Main pulmonary artery is normal in caliber.     Mediastinum: No evidence of mediastinal lymphadenopathy.  The heart and  pericardium demonstrate no acute abnormality.  There is no acute abnormality  of the thoracic aorta.     Lungs/pleura:   Again noted are severe emphysematous changes of the lungs.     There is stable appearance of dominant nodule within the posterolateral right     upper lobe  measuring 1.8 x 1.2 cm in dimension, unchanged.  Faint     calcification about this dominant nodule is again identified suggesting that     the finding is related to changes of old granulomatous disease.  There is     stable appearance of adjacent non

## 2024-07-09 ENCOUNTER — OFFICE VISIT (OUTPATIENT)
Dept: INTERNAL MEDICINE CLINIC | Age: 69
End: 2024-07-09
Payer: COMMERCIAL

## 2024-07-09 VITALS
HEIGHT: 64 IN | DIASTOLIC BLOOD PRESSURE: 80 MMHG | TEMPERATURE: 97.8 F | HEART RATE: 81 BPM | OXYGEN SATURATION: 98 % | SYSTOLIC BLOOD PRESSURE: 118 MMHG | RESPIRATION RATE: 18 BRPM | BODY MASS INDEX: 16.25 KG/M2 | WEIGHT: 95.2 LBS

## 2024-07-09 DIAGNOSIS — J43.2 CENTRILOBULAR EMPHYSEMA (HCC): Primary | ICD-10-CM

## 2024-07-09 DIAGNOSIS — E04.1 THYROID NODULE GREATER THAN OR EQUAL TO 1 CM IN DIAMETER INCIDENTALLY NOTED ON IMAGING STUDY: ICD-10-CM

## 2024-07-09 PROCEDURE — G8419 CALC BMI OUT NRM PARAM NOF/U: HCPCS | Performed by: INTERNAL MEDICINE

## 2024-07-09 PROCEDURE — 3017F COLORECTAL CA SCREEN DOC REV: CPT | Performed by: INTERNAL MEDICINE

## 2024-07-09 PROCEDURE — 3023F SPIROM DOC REV: CPT | Performed by: INTERNAL MEDICINE

## 2024-07-09 PROCEDURE — G8427 DOCREV CUR MEDS BY ELIG CLIN: HCPCS | Performed by: INTERNAL MEDICINE

## 2024-07-09 PROCEDURE — 1123F ACP DISCUSS/DSCN MKR DOCD: CPT | Performed by: INTERNAL MEDICINE

## 2024-07-09 PROCEDURE — 99214 OFFICE O/P EST MOD 30 MIN: CPT | Performed by: INTERNAL MEDICINE

## 2024-07-09 PROCEDURE — 1036F TOBACCO NON-USER: CPT | Performed by: INTERNAL MEDICINE

## 2024-07-09 ASSESSMENT — ANXIETY QUESTIONNAIRES
5. BEING SO RESTLESS THAT IT IS HARD TO SIT STILL: NOT AT ALL
7. FEELING AFRAID AS IF SOMETHING AWFUL MIGHT HAPPEN: NOT AT ALL
6. BECOMING EASILY ANNOYED OR IRRITABLE: NOT AT ALL
IF YOU CHECKED OFF ANY PROBLEMS ON THIS QUESTIONNAIRE, HOW DIFFICULT HAVE THESE PROBLEMS MADE IT FOR YOU TO DO YOUR WORK, TAKE CARE OF THINGS AT HOME, OR GET ALONG WITH OTHER PEOPLE: NOT DIFFICULT AT ALL
3. WORRYING TOO MUCH ABOUT DIFFERENT THINGS: NOT AT ALL
4. TROUBLE RELAXING: NOT AT ALL
2. NOT BEING ABLE TO STOP OR CONTROL WORRYING: NOT AT ALL
GAD7 TOTAL SCORE: 0
1. FEELING NERVOUS, ANXIOUS, OR ON EDGE: NOT AT ALL

## 2024-07-09 ASSESSMENT — PATIENT HEALTH QUESTIONNAIRE - PHQ9
8. MOVING OR SPEAKING SO SLOWLY THAT OTHER PEOPLE COULD HAVE NOTICED. OR THE OPPOSITE, BEING SO FIGETY OR RESTLESS THAT YOU HAVE BEEN MOVING AROUND A LOT MORE THAN USUAL: NOT AT ALL
SUM OF ALL RESPONSES TO PHQ QUESTIONS 1-9: 0
SUM OF ALL RESPONSES TO PHQ QUESTIONS 1-9: 0
2. FEELING DOWN, DEPRESSED OR HOPELESS: NOT AT ALL
4. FEELING TIRED OR HAVING LITTLE ENERGY: NOT AT ALL
SUM OF ALL RESPONSES TO PHQ QUESTIONS 1-9: 0
7. TROUBLE CONCENTRATING ON THINGS, SUCH AS READING THE NEWSPAPER OR WATCHING TELEVISION: NOT AT ALL
1. LITTLE INTEREST OR PLEASURE IN DOING THINGS: NOT AT ALL
10. IF YOU CHECKED OFF ANY PROBLEMS, HOW DIFFICULT HAVE THESE PROBLEMS MADE IT FOR YOU TO DO YOUR WORK, TAKE CARE OF THINGS AT HOME, OR GET ALONG WITH OTHER PEOPLE: NOT DIFFICULT AT ALL
6. FEELING BAD ABOUT YOURSELF - OR THAT YOU ARE A FAILURE OR HAVE LET YOURSELF OR YOUR FAMILY DOWN: NOT AT ALL
9. THOUGHTS THAT YOU WOULD BE BETTER OFF DEAD, OR OF HURTING YOURSELF: NOT AT ALL
5. POOR APPETITE OR OVEREATING: NOT AT ALL
3. TROUBLE FALLING OR STAYING ASLEEP: NOT AT ALL
SUM OF ALL RESPONSES TO PHQ9 QUESTIONS 1 & 2: 0
SUM OF ALL RESPONSES TO PHQ QUESTIONS 1-9: 0

## 2024-07-09 ASSESSMENT — ENCOUNTER SYMPTOMS
CONSTIPATION: 0
HOARSE VOICE: 1

## 2024-07-09 NOTE — PROGRESS NOTES
Gume Enrique (:  1955) is a 68 y.o. male,Established patient, here for evaluation of the following chief complaint(s):  Follow-up (Centrilobular emphysema)      Assessment & Plan   ASSESSMENT/PLAN:  1. Centrilobular emphysema (HCC):stable  2. Thyroid nodule greater than or equal to 1 cm in diameter incidentally noted on imaging study  - will follow up with ENT, advised to schedule US      The above chronic medical problems are stable on the current medical regimen which includes as per EPIC  Medications have been refilled.  Previous labs have been reviewed, new labs have been ordered per chart  I have  reviewed action/ side effects and how to take any new medications.  Patient understands treatment plan or pt understands purpose and side effects.             Subjective   SUBJECTIVE/OBJECTIVE:  Thyroid Problem  Presents for initial visit. Symptoms include anxiety (chronic) and hoarse voice. Patient reports no cold intolerance, constipation, depressed mood, leg swelling, menstrual problem or nail problem. Past treatments include nothing. The treatment provided no relief. The following procedures have not been performed: radioiodine uptake scan, thyroid FNA, thyroid ultrasound and thyroidectomy. There is no history of atrial fibrillation, dementia, diabetes, Graves' ophthalmopathy, heart failure, hyperlipidemia, neuropathy, obesity or osteopenia.   Plan US  in  as per ENT recmmendation    67 yo male with new thyroid nodules noted on CT chest.    Review of Systems   HENT:  Positive for hoarse voice.    Gastrointestinal:  Negative for constipation.   Endocrine: Negative for cold intolerance.   Genitourinary:  Negative for menstrual problem.   Psychiatric/Behavioral:  The patient is nervous/anxious (chronic).    All other systems reviewed and are negative.       @DOS@    Allergies   Allergen Reactions    Penicillin G     Penicillins Hives     Unknown reaction       Current Outpatient Medications

## 2024-07-16 ENCOUNTER — OFFICE VISIT (OUTPATIENT)
Dept: PULMONOLOGY | Age: 69
End: 2024-07-16
Payer: COMMERCIAL

## 2024-07-16 VITALS
DIASTOLIC BLOOD PRESSURE: 70 MMHG | RESPIRATION RATE: 99 BRPM | HEART RATE: 83 BPM | SYSTOLIC BLOOD PRESSURE: 112 MMHG | HEIGHT: 64 IN | WEIGHT: 94.6 LBS | BODY MASS INDEX: 16.15 KG/M2

## 2024-07-16 DIAGNOSIS — B44.1 PULMONARY ASPERGILLOSIS (HCC): ICD-10-CM

## 2024-07-16 DIAGNOSIS — J47.9 BRONCHIECTASIS WITHOUT COMPLICATION (HCC): ICD-10-CM

## 2024-07-16 DIAGNOSIS — J44.9 COPD, MILD (HCC): Primary | ICD-10-CM

## 2024-07-16 DIAGNOSIS — R91.1 PULMONARY NODULE: ICD-10-CM

## 2024-07-16 PROCEDURE — 1036F TOBACCO NON-USER: CPT | Performed by: INTERNAL MEDICINE

## 2024-07-16 PROCEDURE — 99214 OFFICE O/P EST MOD 30 MIN: CPT | Performed by: INTERNAL MEDICINE

## 2024-07-16 PROCEDURE — G8427 DOCREV CUR MEDS BY ELIG CLIN: HCPCS | Performed by: INTERNAL MEDICINE

## 2024-07-16 PROCEDURE — 3023F SPIROM DOC REV: CPT | Performed by: INTERNAL MEDICINE

## 2024-07-16 PROCEDURE — 3017F COLORECTAL CA SCREEN DOC REV: CPT | Performed by: INTERNAL MEDICINE

## 2024-07-16 PROCEDURE — 1123F ACP DISCUSS/DSCN MKR DOCD: CPT | Performed by: INTERNAL MEDICINE

## 2024-07-16 PROCEDURE — G8419 CALC BMI OUT NRM PARAM NOF/U: HCPCS | Performed by: INTERNAL MEDICINE

## 2024-07-16 NOTE — PROGRESS NOTES
calcified pulmonary nodule along the     lateral right upper lobe.  There is partial atelectasis of the right middle  lobe .     Again noted are changes of mild bronchiectasis.     Upper Abdomen: Limited images of the upper abdomen are unremarkable.     Soft Tissues/Bones: No acute bone or soft tissue abnormality.     IMPRESSION:  No evidence of pulmonary embolism or acute pulmonary abnormality.    I reviewed imaging for this visit.  PNs are stable    Repeat in one year.    5.  Pulmonary aspergillosis  No longer taking antifungal medication    6. Acute Hypoxemic Respiratory Failure  Discharged from the hospital with supplemental; O2 @ 2 LPM.  He was 92% at rest on O2 today.  He was 92-94% on RA at rest.  Walking down the hallway his O2 saturation was as low as 89% but recovered quickly. His pulse increased from 102 at rest to a max of 115  He did have echo in the hospital which was a limited study 2/2 emphysema but grossly normal.    I think we can stop his O2 at night now as well.    F/U in three months          Chief Complaint   Patient presents with    3 Month Follow-Up    COPD       HPI  The patient presents with a chief complaint of moderate shortness of breath related to mild COPD of many years duration. He has mild associated cough. Exertion is a modifying factor.  Has a history of pulmonary aspergillosis and has been treated for this.  We have been following a pulmonary nodule on CT imaging.      He was hospitalized with COPD exacerbation precipitated by Influenza A from 4/1 to 4/4/2024. He was treated with Tamiflu, steroids, HHN. His condition improved but he remained hypoxemic and was dc'd with supplemental O2 at 2 lpm. He would like to get off the O2 if possible.     I reviewed images, labs and medical records from his hospitalization for this visit.      Review of Systems  As reviewed in HPI    History  I have reviewed past medical, surgical, social and family history. This is documented elsewhere in the

## 2024-09-19 ENCOUNTER — TELEPHONE (OUTPATIENT)
Dept: PULMONOLOGY | Age: 69
End: 2024-09-19

## 2024-09-19 DIAGNOSIS — J43.2 CENTRILOBULAR EMPHYSEMA (HCC): ICD-10-CM

## 2024-09-19 DIAGNOSIS — J44.9 COPD, MILD (HCC): Primary | ICD-10-CM

## 2024-09-19 RX ORDER — UMECLIDINIUM 62.5 UG/1
1 AEROSOL, POWDER ORAL DAILY
Qty: 1 EACH | Refills: 11 | Status: SHIPPED | OUTPATIENT
Start: 2024-09-19

## 2024-09-27 NOTE — PROGRESS NOTES
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 [address]  - advised need for supplement and increased protein. Centrilobular emphysema ([facility] [address]  - followed by pulmonology regularly  Bipolar disorder, in full remission, most recent episode depressed ([facility] [address]  -     TSH; Future  -     Lipid, Fasting; Future  -     Comprehensive Metabolic Panel, Fasting; Future  -     CBC with Auto Differential; Future  Body mass index (BMI) less than 19  -     TSH; Future  -     Lipid, Fasting; Future  -     Comprehensive Metabolic Panel, Fasting; Future  -     CBC with Auto Differential; Future  Medicare annual wellness visit, subsequent  Abnormal findings on diagnostic imaging of other parts of digestive tract   -     Lipid, Fasting; Future      Recommendations for Preventive Services Due: see orders and patient instructions/AVS.  Recommended screening schedule for the next 5-10 years is provided to the patient in written form: see Patient Instructions/AVS.     Return for Medicare Annual Wellness Visit in 1 year. Subjective   The following acute and/or chronic problems were also addressed today:      Patient's complete Health Risk Assessment and screening values have been reviewed and are found in Flowsheets. The following problems were reviewed today and where indicated follow up appointments were made and/or referrals ordered.     Positive Risk Factor Screenings with Interventions:                 Weight and Activity:  Physical Activity: Inactive    Days of Exercise per Week: 0 days    Minutes of Exercise per Session: 0 min     On average, how many days per week do you engage in moderate to strenuous exercise (like a brisk walk)?: 0 days  Have you lost any weight without trying in the past 3 months?: No  Body mass index: (!) 16. 55      Inactivity Interventions:  Patient declined any further interventions or treatment  Underweight Interventions:  Patient comments: patient with anxiety and paces often, he reports eating regularly      Dentist Screen:  Have you seen the dentist within the past year?: (!) No    Intervention:  Patient declines any further evaluation or treatment    Hearing Screen:  Do you or your family notice any trouble with your hearing that hasn't been managed with hearing aids?: (!) Yes    Interventions:  Advised he needs a hearing evaluation       Advanced Directives:  Do you have a Living Will?: (!) No    Intervention:  has NO advanced directive  - referred to [place]: Discussed the patients choices for care and treatment in case of a health event that adversely affects decision-making abilities. Also discussed the patients long-term treatment options. Reviewed with the patient the appropriate state-specific advance directive documents. Reviewed the process of designating a competent adult as an Agent (or -in-fact) that could take make health care decisions for the patient if incompetent. Patient was asked to complete the declaration forms, if they have not already, either acknowledge the forms by a public notary or an eligible witness and provide a signed copy to the practice office. Objective   Vitals:    01/05/23 1217   BP: 110/70   Site: Right Upper Arm   Position: Sitting   Cuff Size: Medium Adult   Pulse: 70   SpO2: 98%   Weight: 96 lb 6.4 oz (43.7 kg)   Height: 5' 4\" (1.626 m)      Body mass index is 16.55 kg/m².    Wt Readings from Last 3 Encounters:   01/05/23 96 lb 6.4 oz (43.7 kg)   04/11/22 94 lb 9.6 oz (42.9 kg)   01/25/22 94 lb 12.8 oz (43 kg)         General Appearance: alert and oriented to person, place and time, well developed and well- nourished, in no acute distress  Skin: warm and dry, no rash or erythema  Head: normocephalic and atraumatic  Eyes: pupils equal, round, and reactive to light, extraocular eye movements intact, conjunctivae normal  ENT: tympanic membrane, external ear and ear canal normal bilaterally, nose without deformity, nasal mucosa and turbinates normal without polyps  Neck: supple and non-tender without mass, no thyromegaly or thyroid nodules, no cervical lymphadenopathy  Pulmonary/Chest: clear to auscultation bilaterally- no wheezes, rales or rhonchi, normal air movement, no respiratory distress  Cardiovascular: normal rate, regular rhythm, normal S1 and S2, no murmurs, rubs, clicks, or gallops, distal pulses intact, no carotid bruits  Abdomen: soft, non-tender, non-distended, normal bowel sounds, no masses or organomegaly  Extremities: no cyanosis, clubbing or edema  Musculoskeletal: normal range of motion, no joint swelling, deformity or tenderness  Neurologic: reflexes normal and symmetric, no cranial nerve deficit, gait, coordination and speech normal       Allergies   Allergen Reactions    Pcn [Penicillins] Hives     Unknown reaction     Prior to Visit Medications    Medication Sig Taking?  Authorizing Provider   tiotropium (SPIRIVA RESPIMAT) 2.5 MCG/ACT AERS inhaler INHALE 2 PUFFS INTO THE LUNGS DAILY Yes Dain Dumont MD   paliperidone (INVEGA) 6 MG extended release tablet Take 1 tablet by mouth every morning Yes Dain Dumont MD   benztropine (COGENTIN) 0.5 MG tablet Take 1 tablet by mouth 2 times daily Yes Dain Dumont MD   SPIRIVA RESPIMAT 2.5 MCG/ACT AERS inhaler INHALE 2 PUFFS INTO THE LUNGS DAILY Yes Jeffrey Nguyen MD   albuterol sulfate HFA (PROVENTIL;VENTOLIN;PROAIR) 108 (90 Base) MCG/ACT inhaler Inhale 2 puffs into the lungs every 6 hours as needed for Wheezing Yes Jeffrey Nguyen MD   triamcinolone (KENALOG) 0.1 % ointment Apply generous amount to body 3 times daily Yes Dain Dumont MD   ketoconazole (NIZORAL) 2 % shampoo Apply topically daily as needed(use as wash daily Yes Felix Sheth MD   Multiple Vitamin (MULTIVITAMIN PO) Take 1 capsule by mouth daily Yes Historical Provider, MD   albuterol sulfate  (90 Base) MCG/ACT inhaler Inhale 2 puffs into the lungs every 6 hours as needed for Wheezing  Raina Carrel, MD       [facility]Te (Including outside providers/suppliers regularly involved in providing care):   Patient Care Team:  Felix Sheth MD as PCP - General (Internal Medicine/Pediatrics)  Felix Sheth MD as PCP - [facility] Empaneled Provider  Maninder Boyle, RN as Registered Nurse (General Surgery)  Alvarez Edwards, OSWALD as Nurse Navigator     Reviewed and updated this visit:  Allergies  Meds 25.8

## 2024-10-21 ENCOUNTER — TELEPHONE (OUTPATIENT)
Dept: PULMONOLOGY | Age: 69
End: 2024-10-21

## 2024-10-21 NOTE — TELEPHONE ENCOUNTER
Patient said he received the pneumonia shot in this office in Jan 24.  I found a pneumococcal conjugate vaccine given on 11/1/23.

## 2024-10-21 NOTE — TELEPHONE ENCOUNTER
Patient called and wants to know if he needs to get his pneumonia shot now said he had one back in January of 2024     PH:[phone]

## 2024-11-27 ENCOUNTER — TELEPHONE (OUTPATIENT)
Dept: ENT CLINIC | Age: 69
End: 2024-11-27

## 2024-11-27 DIAGNOSIS — E04.2 MULTINODULAR THYROID: Primary | ICD-10-CM

## 2024-12-02 ENCOUNTER — TELEPHONE (OUTPATIENT)
Dept: PULMONOLOGY | Age: 69
End: 2024-12-02

## 2024-12-02 DIAGNOSIS — J44.9 COPD, MILD (HCC): ICD-10-CM

## 2024-12-02 DIAGNOSIS — B44.1 PULMONARY ASPERGILLOSIS (HCC): ICD-10-CM

## 2024-12-02 DIAGNOSIS — R91.1 PULMONARY NODULE: Primary | ICD-10-CM

## 2024-12-02 NOTE — TELEPHONE ENCOUNTER
Patient called and needs order put in for CT WO contrast for January     Please call patient once order is put in  PH: [phone]

## 2025-01-03 ENCOUNTER — HOSPITAL ENCOUNTER (OUTPATIENT)
Dept: ULTRASOUND IMAGING | Age: 70
Discharge: HOME | End: 2025-01-03
Attending: STUDENT IN AN ORGANIZED HEALTH CARE EDUCATION/TRAINING PROGRAM
Payer: COMMERCIAL

## 2025-01-03 DIAGNOSIS — E04.2 MULTINODULAR THYROID: ICD-10-CM

## 2025-01-03 PROCEDURE — 76536 US EXAM OF HEAD AND NECK: CPT

## 2025-01-09 ENCOUNTER — OFFICE VISIT (OUTPATIENT)
Dept: INTERNAL MEDICINE CLINIC | Age: 70
End: 2025-01-09

## 2025-01-09 VITALS
HEART RATE: 84 BPM | HEIGHT: 63 IN | DIASTOLIC BLOOD PRESSURE: 60 MMHG | WEIGHT: 96 LBS | OXYGEN SATURATION: 96 % | BODY MASS INDEX: 17.01 KG/M2 | SYSTOLIC BLOOD PRESSURE: 100 MMHG

## 2025-01-09 DIAGNOSIS — J43.2 CENTRILOBULAR EMPHYSEMA (HCC): ICD-10-CM

## 2025-01-09 DIAGNOSIS — Z00.00 MEDICARE ANNUAL WELLNESS VISIT, SUBSEQUENT: Primary | ICD-10-CM

## 2025-01-09 DIAGNOSIS — F31.32 BIPOLAR AFFECTIVE DISORDER, CURRENTLY DEPRESSED, MODERATE (HCC): ICD-10-CM

## 2025-01-09 DIAGNOSIS — J43.8 OTHER EMPHYSEMA (HCC): ICD-10-CM

## 2025-01-09 DIAGNOSIS — J84.10 GRANULOMATOUS LUNG DISEASE (HCC): ICD-10-CM

## 2025-01-09 DIAGNOSIS — J44.9 COPD, MILD (HCC): ICD-10-CM

## 2025-01-09 DIAGNOSIS — E46 CALORIC MALNUTRITION (HCC): ICD-10-CM

## 2025-01-09 DIAGNOSIS — R05.2 SUBACUTE COUGH: ICD-10-CM

## 2025-01-09 PROBLEM — J96.01 ACUTE HYPOXEMIC RESPIRATORY FAILURE: Status: RESOLVED | Noted: 2024-04-02 | Resolved: 2025-01-09

## 2025-01-09 RX ORDER — DOXYCYCLINE HYCLATE 100 MG
100 TABLET ORAL 2 TIMES DAILY
Qty: 20 TABLET | Refills: 0 | Status: SHIPPED | OUTPATIENT
Start: 2025-01-09 | End: 2025-01-19

## 2025-01-09 SDOH — ECONOMIC STABILITY: FOOD INSECURITY: WITHIN THE PAST 12 MONTHS, YOU WORRIED THAT YOUR FOOD WOULD RUN OUT BEFORE YOU GOT MONEY TO BUY MORE.: NEVER TRUE

## 2025-01-09 SDOH — ECONOMIC STABILITY: FOOD INSECURITY: WITHIN THE PAST 12 MONTHS, THE FOOD YOU BOUGHT JUST DIDN'T LAST AND YOU DIDN'T HAVE MONEY TO GET MORE.: NEVER TRUE

## 2025-01-09 ASSESSMENT — PATIENT HEALTH QUESTIONNAIRE - PHQ9
9. THOUGHTS THAT YOU WOULD BE BETTER OFF DEAD, OR OF HURTING YOURSELF: NOT AT ALL
2. FEELING DOWN, DEPRESSED OR HOPELESS: NOT AT ALL
SUM OF ALL RESPONSES TO PHQ QUESTIONS 1-9: 0
3. TROUBLE FALLING OR STAYING ASLEEP: NOT AT ALL
6. FEELING BAD ABOUT YOURSELF - OR THAT YOU ARE A FAILURE OR HAVE LET YOURSELF OR YOUR FAMILY DOWN: NOT AT ALL
4. FEELING TIRED OR HAVING LITTLE ENERGY: NOT AT ALL
SUM OF ALL RESPONSES TO PHQ QUESTIONS 1-9: 0
SUM OF ALL RESPONSES TO PHQ QUESTIONS 1-9: 0
1. LITTLE INTEREST OR PLEASURE IN DOING THINGS: NOT AT ALL
10. IF YOU CHECKED OFF ANY PROBLEMS, HOW DIFFICULT HAVE THESE PROBLEMS MADE IT FOR YOU TO DO YOUR WORK, TAKE CARE OF THINGS AT HOME, OR GET ALONG WITH OTHER PEOPLE: NOT DIFFICULT AT ALL
SUM OF ALL RESPONSES TO PHQ QUESTIONS 1-9: 0
SUM OF ALL RESPONSES TO PHQ9 QUESTIONS 1 & 2: 0
5. POOR APPETITE OR OVEREATING: NOT AT ALL
7. TROUBLE CONCENTRATING ON THINGS, SUCH AS READING THE NEWSPAPER OR WATCHING TELEVISION: NOT AT ALL

## 2025-01-09 NOTE — PROGRESS NOTES
Mood and Affect: Mood and affect normal.         Behavior: Behavior normal. Behavior is not hyperactive or combative. Behavior is cooperative.         Thought Content: Thought content normal.         Cognition and Memory: Cognition normal.         Judgment: Judgment normal.      Comments: Calm but mildy anxious                 Allergies   Allergen Reactions    Penicillins Hives     Unknown reaction     Prior to Visit Medications    Medication Sig Taking? Authorizing Provider   umeclidinium bromide (INCRUSE ELLIPTA) 62.5 MCG/ACT inhaler Inhale 1 puff into the lungs daily Yes Elijah Obando MD   albuterol sulfate HFA (PROVENTIL;VENTOLIN;PROAIR) 108 (90 Base) MCG/ACT inhaler Inhale 2 puffs into the lungs every 6 hours as needed for Wheezing Yes Seamus Barraza MD   paliperidone (INVEGA) 6 MG extended release tablet Take 1 tablet by mouth every morning Yes Edward West MD   benztropine (COGENTIN) 0.5 MG tablet Take 1 tablet by mouth 2 times daily Yes Edward West MD   Multiple Vitamin (MULTIVITAMIN PO) Take 1 capsule by mouth daily Yes Provider, MD Hernesto Barnes (Including outside providers/suppliers regularly involved in providing care):   Patient Care Team:  Edward West MD as PCP - General (Internal Medicine/Pediatrics)  Edward West MD as PCP - Empaneled Provider  Laurie Garcia     Recommendations for Preventive Services Due: see orders and patient instructions/AVS.  Recommended screening schedule for the next 5-10 years is provided to the patient in written form: see Patient Instructions/AVS.     Reviewed and updated this visit:  Tobacco  Allergies  Meds  Problems  Med Hx  Surg Hx  Soc Hx  Fam Hx

## 2025-01-09 NOTE — PATIENT INSTRUCTIONS
Learning About Being Active as an Older Adult  Why is being active important as you get older?     Being active is one of the best things you can do for your health. And it's never too late to start. Being active--or getting active, if you aren't already--has definite benefits. It can:  Give you more energy,  Keep your mind sharp.  Improve balance to reduce your risk of falls.  Help you manage chronic illness with fewer medicines.  No matter how old you are, how fit you are, or what health problems you have, there is a form of activity that will work for you. And the more physical activity you can do, the better your overall health will be.  What kinds of activity can help you stay healthy?  Being more active will make your daily activities easier. Physical activity includes planned exercise and things you do in daily life. There are four types of activity:  Aerobic.  Doing aerobic activity makes your heart and lungs strong.  Includes walking, dancing, and gardening.  Aim for at least 2½ hours spread throughout the week.  It improves your energy and can help you sleep better.  Muscle-strengthening.  This type of activity can help maintain muscle and strengthen bones.  Includes climbing stairs, using resistance bands, and lifting or carrying heavy loads.  Aim for at least twice a week.  It can help protect the knees and other joints.  Stretching.  Stretching gives you better range of motion in joints and muscles.  Includes upper arm stretches, calf stretches, and gentle yoga.  Aim for at least twice a week, preferably after your muscles are warmed up from other activities.  It can help you function better in daily life.  Balancing.  This helps you stay coordinated and have good posture.  Includes heel-to-toe walking, anayeli chi, and certain types of yoga.  Aim for at least 3 days a week.  It can reduce your risk of falling.  Even if you have a hard time meeting the recommendations, it's better to be more active 
52

## 2025-01-22 ENCOUNTER — HOSPITAL ENCOUNTER (OUTPATIENT)
Dept: CT IMAGING | Age: 70
Discharge: HOME | End: 2025-01-22
Attending: INTERNAL MEDICINE
Payer: COMMERCIAL

## 2025-01-22 DIAGNOSIS — J44.9 COPD, MILD (HCC): ICD-10-CM

## 2025-01-22 DIAGNOSIS — B44.1 PULMONARY ASPERGILLOSIS (HCC): ICD-10-CM

## 2025-01-22 DIAGNOSIS — R91.1 PULMONARY NODULE: ICD-10-CM

## 2025-01-22 PROCEDURE — 71250 CT THORAX DX C-: CPT

## 2025-01-27 ENCOUNTER — OFFICE VISIT (OUTPATIENT)
Dept: PULMONOLOGY | Age: 70
End: 2025-01-27
Payer: COMMERCIAL

## 2025-01-27 VITALS
DIASTOLIC BLOOD PRESSURE: 70 MMHG | HEIGHT: 63 IN | BODY MASS INDEX: 16.66 KG/M2 | SYSTOLIC BLOOD PRESSURE: 106 MMHG | OXYGEN SATURATION: 95 % | WEIGHT: 94 LBS | HEART RATE: 94 BPM

## 2025-01-27 DIAGNOSIS — J43.2 CENTRILOBULAR EMPHYSEMA (HCC): ICD-10-CM

## 2025-01-27 DIAGNOSIS — J44.9 COPD, MILD (HCC): Primary | ICD-10-CM

## 2025-01-27 DIAGNOSIS — K21.9 GASTROESOPHAGEAL REFLUX DISEASE WITHOUT ESOPHAGITIS: ICD-10-CM

## 2025-01-27 DIAGNOSIS — J47.9 BRONCHIECTASIS WITHOUT COMPLICATION (HCC): ICD-10-CM

## 2025-01-27 DIAGNOSIS — R91.1 PULMONARY NODULE: ICD-10-CM

## 2025-01-27 PROCEDURE — 3023F SPIROM DOC REV: CPT | Performed by: INTERNAL MEDICINE

## 2025-01-27 PROCEDURE — G8419 CALC BMI OUT NRM PARAM NOF/U: HCPCS | Performed by: INTERNAL MEDICINE

## 2025-01-27 PROCEDURE — 3017F COLORECTAL CA SCREEN DOC REV: CPT | Performed by: INTERNAL MEDICINE

## 2025-01-27 PROCEDURE — 1036F TOBACCO NON-USER: CPT | Performed by: INTERNAL MEDICINE

## 2025-01-27 PROCEDURE — 99214 OFFICE O/P EST MOD 30 MIN: CPT | Performed by: INTERNAL MEDICINE

## 2025-01-27 PROCEDURE — G8427 DOCREV CUR MEDS BY ELIG CLIN: HCPCS | Performed by: INTERNAL MEDICINE

## 2025-01-27 PROCEDURE — 1123F ACP DISCUSS/DSCN MKR DOCD: CPT | Performed by: INTERNAL MEDICINE

## 2025-01-27 NOTE — PROGRESS NOTES
[facility]  Follow Up Note  Seamus Barraza MD       Gume Enrique   YOB: 1955    Date of Visit:  1/27/2025    Assessment/Plan:  1. COPD, mild (HCC)  Stable  PFT 10/17:  INTERPRETATION:  Spirometry attempts were acceptable and reproducible.  FVC was normal at 3.28 L, 88% predicted and decreased FEV1 of 1.99 L,  71% predicted.  FEV1/FVC ratio was decreased at 61%.  No response to  bronchodilators demonstrated on spirometry.  Lung volumes showed  normal total lung capacity of 108% predicted.  Diffusion capacity  showed decreased DLCO of 47% predicted.     IMPRESSION:  Mild obstructive defect on spirometry with no response to  bronchodilators.  Normal lung volumes and moderate decrease in  diffusion capacity.    Medication management:  Spiriva ==> very expensive for him ==> He is now on Incruse  Cost is an issue for him  Albuterol    2. Centrilobular emphysema (HCC)  Stable  Noted on CT imaging  Emphysema is severe    3. Bronchiectasis without complication (HCC)  Stable  Also noted on CT imaging    4. Pulmonary nodule  New nodule  CT Chest  1/25:  FINDINGS:  Mediastinum: Coronary artery calcifications are a marker of atherosclerosis.  No change in the trace pericardial effusion.  There are no enlarged thoracic  lymph nodes.  Calcified granulomatous disease is noted.  No change in the  heterogeneous thyroid gland.     Lungs/pleura: The tracheobronchial tree is patent.  There is mild bilateral  lower lobe bronchiectasis.  There is no pneumothorax or pleural effusion.  Moderate to severe emphysema involves the bilateral lungs with bibasilar  scarring.     Tree-in-bud opacities are present within the right lower lobe.  No change in  the 4 mm solid right upper lobe pulmonary nodule.  However, several new right  lower lobe pulmonary nodules have developed including a 6 mm nodule.     Upper Abdomen: Images of the upper abdomen are unremarkable.     Soft

## 2025-07-08 ENCOUNTER — HOSPITAL ENCOUNTER (OUTPATIENT)
Dept: CT IMAGING | Age: 70
Discharge: HOME | End: 2025-07-08
Attending: INTERNAL MEDICINE
Payer: COMMERCIAL

## 2025-07-08 DIAGNOSIS — R91.1 PULMONARY NODULE: ICD-10-CM

## 2025-07-08 PROCEDURE — 71250 CT THORAX DX C-: CPT

## 2025-07-14 ENCOUNTER — RESULTS FOLLOW-UP (OUTPATIENT)
Dept: PULMONOLOGY | Age: 70
End: 2025-07-14

## 2025-07-15 ENCOUNTER — OFFICE VISIT (OUTPATIENT)
Dept: INTERNAL MEDICINE CLINIC | Age: 70
End: 2025-07-15

## 2025-07-15 VITALS
OXYGEN SATURATION: 95 % | TEMPERATURE: 97.7 F | WEIGHT: 92.8 LBS | SYSTOLIC BLOOD PRESSURE: 94 MMHG | DIASTOLIC BLOOD PRESSURE: 70 MMHG | HEART RATE: 68 BPM | BODY MASS INDEX: 16.24 KG/M2

## 2025-07-15 DIAGNOSIS — R05.2 SUBACUTE COUGH: ICD-10-CM

## 2025-07-15 DIAGNOSIS — J44.9 COPD, MILD (HCC): Primary | ICD-10-CM

## 2025-07-15 DIAGNOSIS — J30.1 NON-SEASONAL ALLERGIC RHINITIS DUE TO POLLEN: ICD-10-CM

## 2025-07-15 DIAGNOSIS — R63.4 UNINTENTIONAL WEIGHT LOSS: ICD-10-CM

## 2025-07-15 RX ORDER — CETIRIZINE HYDROCHLORIDE 10 MG/1
10 TABLET ORAL DAILY
Qty: 90 TABLET | Refills: 1 | Status: SHIPPED | OUTPATIENT
Start: 2025-07-15 | End: 2025-07-15

## 2025-07-15 RX ORDER — ALBUTEROL SULFATE 90 UG/1
2 INHALANT RESPIRATORY (INHALATION) EVERY 6 HOURS PRN
Qty: 18 G | Refills: 11 | Status: SHIPPED | OUTPATIENT
Start: 2025-07-15 | End: 2025-07-15

## 2025-07-15 RX ORDER — CETIRIZINE HYDROCHLORIDE 10 MG/1
10 TABLET ORAL DAILY
Qty: 90 TABLET | Refills: 1 | COMMUNITY
Start: 2025-07-15 | End: 2025-07-15

## 2025-07-15 RX ORDER — ALBUTEROL SULFATE 90 UG/1
2 INHALANT RESPIRATORY (INHALATION) EVERY 6 HOURS PRN
Qty: 18 G | Refills: 11 | Status: SHIPPED | OUTPATIENT
Start: 2025-07-15 | End: 2026-07-15

## 2025-07-15 RX ORDER — CETIRIZINE HYDROCHLORIDE 10 MG/1
10 TABLET ORAL DAILY
Qty: 90 TABLET | Refills: 1 | Status: SHIPPED | OUTPATIENT
Start: 2025-07-15

## 2025-07-15 ASSESSMENT — ENCOUNTER SYMPTOMS
WHEEZING: 1
SHORTNESS OF BREATH: 1
HOARSE VOICE: 0
HEMOPTYSIS: 0
FREQUENT THROAT CLEARING: 0
CHEST TIGHTNESS: 0
DIFFICULTY BREATHING: 1
SPUTUM PRODUCTION: 1
COUGH: 1

## 2025-07-15 ASSESSMENT — COPD QUESTIONNAIRES: COPD: 1

## 2025-07-21 ENCOUNTER — OFFICE VISIT (OUTPATIENT)
Dept: PULMONOLOGY | Age: 70
End: 2025-07-21
Payer: COMMERCIAL

## 2025-07-21 VITALS
DIASTOLIC BLOOD PRESSURE: 60 MMHG | WEIGHT: 93.4 LBS | HEART RATE: 84 BPM | BODY MASS INDEX: 16.55 KG/M2 | HEIGHT: 63 IN | OXYGEN SATURATION: 95 % | SYSTOLIC BLOOD PRESSURE: 90 MMHG

## 2025-07-21 DIAGNOSIS — J44.9 COPD, MILD (HCC): Primary | ICD-10-CM

## 2025-07-21 DIAGNOSIS — B44.1 PULMONARY ASPERGILLOSIS (HCC): ICD-10-CM

## 2025-07-21 DIAGNOSIS — K21.9 GASTROESOPHAGEAL REFLUX DISEASE WITHOUT ESOPHAGITIS: ICD-10-CM

## 2025-07-21 DIAGNOSIS — R91.1 PULMONARY NODULE: ICD-10-CM

## 2025-07-21 PROCEDURE — G8419 CALC BMI OUT NRM PARAM NOF/U: HCPCS | Performed by: INTERNAL MEDICINE

## 2025-07-21 PROCEDURE — 3023F SPIROM DOC REV: CPT | Performed by: INTERNAL MEDICINE

## 2025-07-21 PROCEDURE — 99214 OFFICE O/P EST MOD 30 MIN: CPT | Performed by: INTERNAL MEDICINE

## 2025-07-21 PROCEDURE — 3017F COLORECTAL CA SCREEN DOC REV: CPT | Performed by: INTERNAL MEDICINE

## 2025-07-21 PROCEDURE — 1036F TOBACCO NON-USER: CPT | Performed by: INTERNAL MEDICINE

## 2025-07-21 PROCEDURE — 1123F ACP DISCUSS/DSCN MKR DOCD: CPT | Performed by: INTERNAL MEDICINE

## 2025-07-21 PROCEDURE — G8427 DOCREV CUR MEDS BY ELIG CLIN: HCPCS | Performed by: INTERNAL MEDICINE

## 2025-07-21 NOTE — PROGRESS NOTES
[facility]  Follow Up Note  Seamus Barraza MD       Gume Enrique   YOB: 1955    Date of Visit:  7/21/2025    Assessment/Plan:  1. COPD, mild (HCC)  Stable  PFT 10/17:  INTERPRETATION:  Spirometry attempts were acceptable and reproducible.  FVC was normal at 3.28 L, 88% predicted and decreased FEV1 of 1.99 L,  71% predicted.  FEV1/FVC ratio was decreased at 61%.  No response to  bronchodilators demonstrated on spirometry.  Lung volumes showed  normal total lung capacity of 108% predicted.  Diffusion capacity  showed decreased DLCO of 47% predicted.     IMPRESSION:  Mild obstructive defect on spirometry with no response to  bronchodilators.  Normal lung volumes and moderate decrease in  diffusion capacity.    Medication management:  Spiriva ==> very expensive for him ==> He is now on Incruse  Cost is an issue for him  Albuterol    2. Centrilobular emphysema (HCC)  Stable  Noted on CT imaging  Emphysema is severe    3. Bronchiectasis without complication (HCC)  Stable  Also noted on CT imaging    4. Pulmonary nodule  New nodule  CT Chest  1/25:  FINDINGS:  Mediastinum: Heart size normal with small pericardial effusion that is  stable.  Aorta and pulmonary arteries are normal.  Densely calcified  mediastinal and right hilar lymph nodes.  Incidental left thyroid nodule  measuring 11 mm, stable.  Normal esophagus.     Lungs/pleura: Bronchiectasis and peribronchial thickening is demonstrated  diffusely.  Peripheral mucoid impaction in the right lower lobe with  scattered reticular airspace opacities.  Scattered subcentimeter nodules in  the right lower lobe are stable to decreased in size and conspicuity.  These  are within a larger background of bandlike airspace change and septal  thickening in association with airway findings above.  Stable 5 mm subpleural  nodule in the right upper lobe.  This is adjacent to a calcified granuloma.  Moderate centrilobular

## (undated) DEVICE — SOLUTION IV IRRIG WATER 500ML POUR BRL ST 2F7113

## (undated) DEVICE — AIRWAY PHGEAL SZ 9 9CM PNK AD ORAL FBROPT INTUB PLAS DISP

## (undated) DEVICE — PROCEDURE KIT ENDOSCP CUST

## (undated) DEVICE — SINGLE USE BIOPSY VALVE MAJ-210: Brand: SINGLE USE BIOPSY VALVE (STERILE)

## (undated) DEVICE — GOWN AURORA NONREINF LG: Brand: MEDLINE INDUSTRIES, INC.

## (undated) DEVICE — CONMED CHANNEL MASTER PULMONARY AND PEDIATRIC CLEANING BRUSH, 160 CM X 2.0 MM: Brand: CONMED

## (undated) DEVICE — BW-412T DISP COMBO CLEANING BRUSH: Brand: SINGLE USE COMBINATION CLEANING BRUSH

## (undated) DEVICE — SINGLE USE SUCTION VALVE MAJ-209: Brand: SINGLE USE SUCTION VALVE (STERILE)

## (undated) DEVICE — FORCEPS BX L240CM WRK CHN 2.8MM STD CAP W/ NDL MIC MESH

## (undated) DEVICE — SYRINGE INFL 60ML DISP ALLIANCE II

## (undated) DEVICE — SYRINGE MED 10ML POLYPR LUERSLIP TIP FLAT TOP W/O SFTY DISP

## (undated) DEVICE — 60 ML SYRINGE,REGULAR TIP: Brand: MONOJECT

## (undated) DEVICE — SPECIMEN TRAP: Brand: ARGYLE

## (undated) DEVICE — MOUTHPIECE ENDOSCP L CTRL OPN AND SIDE PORTS DISP

## (undated) DEVICE — SET VLV 3 PC AWS DISPOSABLE GRDIAN SCOPEVALET

## (undated) DEVICE — DILATOR ENDOSCP L180CM DIA6FR BLLN L8CM DIA54-60FR